# Patient Record
Sex: MALE | Race: WHITE | Employment: FULL TIME | URBAN - METROPOLITAN AREA
[De-identification: names, ages, dates, MRNs, and addresses within clinical notes are randomized per-mention and may not be internally consistent; named-entity substitution may affect disease eponyms.]

---

## 2018-01-28 ENCOUNTER — HOSPITAL ENCOUNTER (OUTPATIENT)
Facility: CLINIC | Age: 43
Setting detail: OBSERVATION
Discharge: HOME | End: 2018-01-29
Attending: EMERGENCY MEDICINE | Admitting: INTERNAL MEDICINE
Payer: COMMERCIAL

## 2018-01-28 DIAGNOSIS — E87.20 LACTIC ACIDOSIS: ICD-10-CM

## 2018-01-28 DIAGNOSIS — K52.9 ACUTE GASTROENTERITIS: ICD-10-CM

## 2018-01-28 LAB
ALBUMIN SERPL-MCNC: 4.1 G/DL (ref 3.4–5)
ALP SERPL-CCNC: 79 U/L (ref 40–150)
ALT SERPL W P-5'-P-CCNC: 26 U/L (ref 0–70)
ANION GAP SERPL CALCULATED.3IONS-SCNC: 12 MMOL/L (ref 3–14)
AST SERPL W P-5'-P-CCNC: 21 U/L (ref 0–45)
BASOPHILS # BLD AUTO: 0.1 10E9/L (ref 0–0.2)
BASOPHILS NFR BLD AUTO: 0.4 %
BILIRUB DIRECT SERPL-MCNC: 0.1 MG/DL (ref 0–0.2)
BILIRUB SERPL-MCNC: 0.5 MG/DL (ref 0.2–1.3)
BUN SERPL-MCNC: 19 MG/DL (ref 7–30)
CALCIUM SERPL-MCNC: 8.4 MG/DL (ref 8.5–10.1)
CHLORIDE SERPL-SCNC: 105 MMOL/L (ref 94–109)
CO2 SERPL-SCNC: 23 MMOL/L (ref 20–32)
CREAT SERPL-MCNC: 0.86 MG/DL (ref 0.66–1.25)
DIFFERENTIAL METHOD BLD: ABNORMAL
EOSINOPHIL # BLD AUTO: 0.2 10E9/L (ref 0–0.7)
EOSINOPHIL NFR BLD AUTO: 0.9 %
ERYTHROCYTE [DISTWIDTH] IN BLOOD BY AUTOMATED COUNT: 12.6 % (ref 10–15)
GFR SERPL CREATININE-BSD FRML MDRD: >90 ML/MIN/1.7M2
GLUCOSE BLDC GLUCOMTR-MCNC: 84 MG/DL (ref 70–99)
GLUCOSE SERPL-MCNC: 88 MG/DL (ref 70–99)
HCT VFR BLD AUTO: 52.3 % (ref 40–53)
HGB BLD-MCNC: 17.9 G/DL (ref 13.3–17.7)
IMM GRANULOCYTES # BLD: 0.1 10E9/L (ref 0–0.4)
IMM GRANULOCYTES NFR BLD: 0.4 %
LACTATE BLD-SCNC: 4.4 MMOL/L (ref 0.7–2)
LIPASE SERPL-CCNC: 104 U/L (ref 73–393)
LYMPHOCYTES # BLD AUTO: 1 10E9/L (ref 0.8–5.3)
LYMPHOCYTES NFR BLD AUTO: 6.1 %
MCH RBC QN AUTO: 28.9 PG (ref 26.5–33)
MCHC RBC AUTO-ENTMCNC: 34.2 G/DL (ref 31.5–36.5)
MCV RBC AUTO: 84 FL (ref 78–100)
MONOCYTES # BLD AUTO: 1.1 10E9/L (ref 0–1.3)
MONOCYTES NFR BLD AUTO: 6.6 %
NEUTROPHILS # BLD AUTO: 14.2 10E9/L (ref 1.6–8.3)
NEUTROPHILS NFR BLD AUTO: 85.6 %
NRBC # BLD AUTO: 0 10*3/UL
NRBC BLD AUTO-RTO: 0 /100
PLATELET # BLD AUTO: 296 10E9/L (ref 150–450)
POTASSIUM SERPL-SCNC: 3.2 MMOL/L (ref 3.4–5.3)
PROT SERPL-MCNC: 7.4 G/DL (ref 6.8–8.8)
RBC # BLD AUTO: 6.2 10E12/L (ref 4.4–5.9)
SODIUM SERPL-SCNC: 140 MMOL/L (ref 133–144)
WBC # BLD AUTO: 16.6 10E9/L (ref 4–11)

## 2018-01-28 PROCEDURE — 25000132 ZZH RX MED GY IP 250 OP 250 PS 637: Performed by: EMERGENCY MEDICINE

## 2018-01-28 PROCEDURE — 83605 ASSAY OF LACTIC ACID: CPT | Performed by: EMERGENCY MEDICINE

## 2018-01-28 PROCEDURE — 99285 EMERGENCY DEPT VISIT HI MDM: CPT | Mod: 25

## 2018-01-28 PROCEDURE — 96361 HYDRATE IV INFUSION ADD-ON: CPT

## 2018-01-28 PROCEDURE — 00000146 ZZHCL STATISTIC GLUCOSE BY METER IP

## 2018-01-28 PROCEDURE — 85025 COMPLETE CBC W/AUTO DIFF WBC: CPT | Performed by: EMERGENCY MEDICINE

## 2018-01-28 PROCEDURE — 80076 HEPATIC FUNCTION PANEL: CPT | Performed by: EMERGENCY MEDICINE

## 2018-01-28 PROCEDURE — 96374 THER/PROPH/DIAG INJ IV PUSH: CPT

## 2018-01-28 PROCEDURE — 25000128 H RX IP 250 OP 636: Performed by: EMERGENCY MEDICINE

## 2018-01-28 PROCEDURE — 25000125 ZZHC RX 250: Performed by: EMERGENCY MEDICINE

## 2018-01-28 PROCEDURE — 96375 TX/PRO/DX INJ NEW DRUG ADDON: CPT

## 2018-01-28 PROCEDURE — 80048 BASIC METABOLIC PNL TOTAL CA: CPT | Performed by: EMERGENCY MEDICINE

## 2018-01-28 PROCEDURE — 83690 ASSAY OF LIPASE: CPT | Performed by: EMERGENCY MEDICINE

## 2018-01-28 RX ORDER — SODIUM CHLORIDE, SODIUM LACTATE, POTASSIUM CHLORIDE, CALCIUM CHLORIDE 600; 310; 30; 20 MG/100ML; MG/100ML; MG/100ML; MG/100ML
INJECTION, SOLUTION INTRAVENOUS CONTINUOUS
Status: DISCONTINUED | OUTPATIENT
Start: 2018-01-28 | End: 2018-01-29

## 2018-01-28 RX ORDER — POTASSIUM CHLORIDE 1.5 G/1.58G
40 POWDER, FOR SOLUTION ORAL ONCE
Status: COMPLETED | OUTPATIENT
Start: 2018-01-28 | End: 2018-01-28

## 2018-01-28 RX ORDER — ONDANSETRON 2 MG/ML
4 INJECTION INTRAMUSCULAR; INTRAVENOUS ONCE
Status: COMPLETED | OUTPATIENT
Start: 2018-01-28 | End: 2018-01-28

## 2018-01-28 RX ADMIN — FAMOTIDINE 20 MG: 10 INJECTION, SOLUTION INTRAVENOUS at 23:08

## 2018-01-28 RX ADMIN — POTASSIUM CHLORIDE 40 MEQ: 1.5 POWDER, FOR SOLUTION ORAL at 23:04

## 2018-01-28 RX ADMIN — SODIUM CHLORIDE, POTASSIUM CHLORIDE, SODIUM LACTATE AND CALCIUM CHLORIDE 3975 ML: 600; 310; 30; 20 INJECTION, SOLUTION INTRAVENOUS at 22:30

## 2018-01-28 RX ADMIN — ONDANSETRON 4 MG: 2 INJECTION INTRAMUSCULAR; INTRAVENOUS at 22:13

## 2018-01-28 RX ADMIN — SODIUM CHLORIDE 1000 ML: 9 INJECTION, SOLUTION INTRAVENOUS at 22:13

## 2018-01-28 ASSESSMENT — ENCOUNTER SYMPTOMS
COUGH: 0
MYALGIAS: 0
ABDOMINAL PAIN: 1
NAUSEA: 1
VOMITING: 1
RHINORRHEA: 0
APPETITE CHANGE: 0
DIARRHEA: 0
FEVER: 0

## 2018-01-28 NOTE — IP AVS SNAPSHOT
MRN:[id number]                      After Visit Summary   1/28/2018    Karthik Lopez    MRN: [id number]           Thank you!     Thank you for choosing [facility] for your care. Our goal is always to provide you with excellent care. Hearing back from our patients is one way we can continue to improve our services. Please take a few minutes to complete the written survey that you may receive in the mail after you visit. If you would like to speak to someone directly about your visit please contact Patient Relations at [phone]. Thank you!          Patient Information     Date Of Birth          1975        About your hospital stay     You were admitted on:  January 29, 2018 You last received care in the:  [facility] Observation Department    You were discharged on:  January 29, 2018        Reason for your hospital stay       You were admitted for concerns of nausea and vomiting. Lab work showed signs of significant dehydration from vomiting, including an elevated lactic acid, white blood count, and low potassium. All of these resolved after treatment on follow up labs. Your stool was sent for culture and was pending at time of discharge. We suspect this was a viral infection vs food poisoning that does not require antibiotics. You were treated supportively on observation with fluids and nausea meds. You improved and were allowed to discharge home. We recommend you stay well hydrated and advance your diet as tolerated.                  Who to Call     For medical emergencies, please call 911.  For non-urgent questions about your medical care, please call your primary care provider or clinic, None          Attending Provider     Provider Specialty    Joselito So MD Emergency Medicine    Savanna Osborne MD Internal Medicine       Primary Care Provider Fax #    Provider Not In System [phone]      After Care Instructions     Activity       Your activity  "upon discharge: activity as tolerated            Diet       Follow this diet upon discharge: Orders Placed This Encounter      Advance Diet as Tolerated            Discharge Instructions       1. Until tolerating adequate oral intake, only take 30 units of your morning Lantus with sliding scale based on what you eat throughout the day.   2. Would hold your Invokana until your are tolerating a regular diet   3. Resume all your diabetic medications once you are eating a regular diet                  Follow-up Appointments     Follow-up and recommended labs and tests        Follow up with primary care provider within 7 days for hospital follow- up.  No follow up labs or test are needed.                             Pending Results     No orders found for last 3 day(s).            Statement of Approval     Ordered          01/29/18 1909  I have reviewed and agree with all the recommendations and orders detailed in this document.  EFFECTIVE NOW     Approved and electronically signed by:  Tracie Tam PA-C             Admission Information     Date & Time Provider Department Dept. Phone    1/28/2018 Savanna Osborne MD [facility] Observation Department [phone]      Your Vitals Were     Blood Pressure Pulse Temperature Respirations Height Weight    106/68 (BP Location: Right arm) 93 98.1  F (36.7  C) (Oral) 22 1.803 m (5' 11\") 134.1 kg (295 lb 9.6 oz)    Pulse Oximetry BMI (Body Mass Index)                94% 41.23 kg/m2          MyChart Information     [facility] lets you send messages to your doctor, view your test results, renew your prescriptions, schedule appointments and more. To sign up, go to [email or web address] . Click on \"Log in\" on the left side of the screen, which will take you to the Welcome page. Then click on \"Sign up Now\" on the right side of the page.     You will be asked to enter the access code listed below, as well as some personal information. Please follow the directions to " create your username and password.     Your access code is: [email or web address]  Expires: 2018  7:10 PM     Your access code will  in 90 days. If you need help or a new code, please call your [facility] clinic or [phone].        Care EveryWhere ID     This is your Care EveryWhere ID. This could be used by other organizations to access your [facility] medical records  [id number]        Equal Access to Services     [facility] : Hadii jeannine zavala hadasho Soomaali, waaxda luqadaha, qaybta kaalmada adeegyada, jing torresmilocrissy cloud . So [facility] [phone].    ATENCIÓN: Si habla español, tiene a romo disposición servicios gratuitos de asistencia lingüística. Juliana al [phone].    We comply with applicable federal civil rights laws and [place] laws. We do not discriminate on the basis of race, color, national origin, age, disability, sex, sexual orientation, or gender identity.               Review of your medicines      CONTINUE these medicines which have NOT CHANGED        Dose / Directions    aspirin 81 MG tablet        Dose:  81 mg   Take 81 mg by mouth daily   Refills:  0       canaliflozin tablet   Commonly known as:  INVOKANA        Dose:  300 mg   Take 300 mg by mouth every morning (before breakfast)   Refills:  0       HumaLOG KWIKpen 100 UNIT/ML injection   Generic drug:  insulin lispro   Notes to Patient:  As directed        Inject Subcutaneous 4 times daily (with meals and nightly) Injects 1 unit per 15 grams of carbohydrates, up to 70 units daily in 4 divided doses (with meals and at bedtime).   Refills:  0       insulin glargine 100 UNIT/ML injection   Commonly known as:  LANTUS        Dose:  60 Units   Inject 60 Units Subcutaneous every morning   Refills:  0       levothyroxine 137 MCG tablet   Commonly known as:  SYNTHROID/LEVOTHROID        Dose:  137 mcg   Take 137 mcg by mouth every morning (before breakfast)   Refills:  0                Protect others around you: Learn how to  safely use, store and throw away your medicines at www.disposemymeds.org.             Medication List: This is a list of all your medications and when to take them. Check marks below indicate your daily home schedule. Keep this list as a reference.      Medications           Morning Afternoon Evening Bedtime As Needed    aspirin 81 MG tablet   Take 81 mg by mouth daily                                   canaliflozin tablet   Commonly known as:  INVOKANA   Take 300 mg by mouth every morning (before breakfast)                                   HumaLOG KWIKpen 100 UNIT/ML injection   Inject Subcutaneous 4 times daily (with meals and nightly) Injects 1 unit per 15 grams of carbohydrates, up to 70 units daily in 4 divided doses (with meals and at bedtime).   Generic drug:  insulin lispro   Notes to Patient:  As directed                                            insulin glargine 100 UNIT/ML injection   Commonly known as:  LANTUS   Inject 60 Units Subcutaneous every morning                                   levothyroxine 137 MCG tablet   Commonly known as:  SYNTHROID/LEVOTHROID   Take 137 mcg by mouth every morning (before breakfast)

## 2018-01-28 NOTE — IP AVS SNAPSHOT
[facility] Observation Department    [address]    [facility] [address]    Phone:  [phone]                                       After Visit Summary   1/28/2018    Karthik Lopez    MRN: [id number]           After Visit Summary Signature Page     I have received my discharge instructions, and my questions have been answered. I have discussed any challenges I see with this plan with the nurse or doctor.    ..........................................................................................................................................  Patient/Patient Representative Signature      ..........................................................................................................................................  Patient Representative Print Name and Relationship to Patient    ..................................................               ................................................  Date                                            Time    ..........................................................................................................................................  Reviewed by Signature/Title    ...................................................              ..............................................  Date                                                            Time

## 2018-01-29 VITALS
RESPIRATION RATE: 22 BRPM | HEIGHT: 71 IN | BODY MASS INDEX: 41.38 KG/M2 | HEART RATE: 93 BPM | OXYGEN SATURATION: 94 % | TEMPERATURE: 98.1 F | SYSTOLIC BLOOD PRESSURE: 106 MMHG | DIASTOLIC BLOOD PRESSURE: 68 MMHG | WEIGHT: 295.6 LBS

## 2018-01-29 PROBLEM — E86.0 DEHYDRATION: Status: ACTIVE | Noted: 2018-01-29

## 2018-01-29 LAB
ALBUMIN UR-MCNC: NEGATIVE MG/DL
ANION GAP SERPL CALCULATED.3IONS-SCNC: 6 MMOL/L (ref 3–14)
APPEARANCE UR: CLEAR
BILIRUB UR QL STRIP: NEGATIVE
BUN SERPL-MCNC: 19 MG/DL (ref 7–30)
C COLI+JEJUNI+LARI FUSA STL QL NAA+PROBE: NOT DETECTED
CALCIUM SERPL-MCNC: 7.3 MG/DL (ref 8.5–10.1)
CHLORIDE SERPL-SCNC: 108 MMOL/L (ref 94–109)
CO2 BLDCOV-SCNC: 24 MMOL/L (ref 21–28)
CO2 SERPL-SCNC: 26 MMOL/L (ref 20–32)
COLOR UR AUTO: YELLOW
CREAT SERPL-MCNC: 0.82 MG/DL (ref 0.66–1.25)
EC STX1 GENE STL QL NAA+PROBE: NOT DETECTED
EC STX2 GENE STL QL NAA+PROBE: NOT DETECTED
ENTERIC PATHOGEN COMMENT: NORMAL
ERYTHROCYTE [DISTWIDTH] IN BLOOD BY AUTOMATED COUNT: 12.7 % (ref 10–15)
GFR SERPL CREATININE-BSD FRML MDRD: >90 ML/MIN/1.7M2
GLUCOSE BLDC GLUCOMTR-MCNC: 106 MG/DL (ref 70–99)
GLUCOSE BLDC GLUCOMTR-MCNC: 121 MG/DL (ref 70–99)
GLUCOSE BLDC GLUCOMTR-MCNC: 172 MG/DL (ref 70–99)
GLUCOSE BLDC GLUCOMTR-MCNC: 175 MG/DL (ref 70–99)
GLUCOSE BLDC GLUCOMTR-MCNC: 79 MG/DL (ref 70–99)
GLUCOSE SERPL-MCNC: 92 MG/DL (ref 70–99)
GLUCOSE UR STRIP-MCNC: >499 MG/DL
HBA1C MFR BLD: 8.8 % (ref 4.3–6)
HCT VFR BLD AUTO: 43.4 % (ref 40–53)
HGB BLD-MCNC: 14.7 G/DL (ref 13.3–17.7)
HGB UR QL STRIP: NEGATIVE
INTERPRETATION ECG - MUSE: NORMAL
KETONES UR STRIP-MCNC: 5 MG/DL
LACTATE BLD-SCNC: 1.6 MMOL/L (ref 0.7–2)
LACTATE BLD-SCNC: 3.7 MMOL/L (ref 0.7–2.1)
LEUKOCYTE ESTERASE UR QL STRIP: NEGATIVE
MCH RBC QN AUTO: 29.1 PG (ref 26.5–33)
MCHC RBC AUTO-ENTMCNC: 33.9 G/DL (ref 31.5–36.5)
MCV RBC AUTO: 86 FL (ref 78–100)
MUCOUS THREADS #/AREA URNS LPF: PRESENT /LPF
NITRATE UR QL: NEGATIVE
NOROV GI+II ORF1-ORF2 JNC STL QL NAA+PR: NOT DETECTED
PCO2 BLDV: 45 MM HG (ref 40–50)
PH BLDV: 7.33 PH (ref 7.32–7.43)
PH UR STRIP: 6 PH (ref 5–7)
PLATELET # BLD AUTO: 201 10E9/L (ref 150–450)
PO2 BLDV: 24 MM HG (ref 25–47)
POTASSIUM SERPL-SCNC: 4.2 MMOL/L (ref 3.4–5.3)
RBC # BLD AUTO: 5.05 10E12/L (ref 4.4–5.9)
RBC #/AREA URNS AUTO: 0 /HPF (ref 0–2)
RVA NSP5 STL QL NAA+PROBE: NOT DETECTED
SALMONELLA SP RPOD STL QL NAA+PROBE: NOT DETECTED
SAO2 % BLDV FROM PO2: 38 %
SHIGELLA SP+EIEC IPAH STL QL NAA+PROBE: NOT DETECTED
SODIUM SERPL-SCNC: 140 MMOL/L (ref 133–144)
SOURCE: ABNORMAL
SP GR UR STRIP: 1.03 (ref 1–1.03)
SQUAMOUS #/AREA URNS AUTO: <1 /HPF (ref 0–1)
UROBILINOGEN UR STRIP-MCNC: 2 MG/DL (ref 0–2)
V CHOL+PARA RFBL+TRKH+TNAA STL QL NAA+PR: NOT DETECTED
WBC # BLD AUTO: 10.4 10E9/L (ref 4–11)
WBC #/AREA URNS AUTO: <1 /HPF (ref 0–2)
Y ENTERO RECN STL QL NAA+PROBE: NOT DETECTED

## 2018-01-29 PROCEDURE — 00000146 ZZHCL STATISTIC GLUCOSE BY METER IP

## 2018-01-29 PROCEDURE — 83036 HEMOGLOBIN GLYCOSYLATED A1C: CPT | Performed by: INTERNAL MEDICINE

## 2018-01-29 PROCEDURE — 93005 ELECTROCARDIOGRAM TRACING: CPT

## 2018-01-29 PROCEDURE — 83605 ASSAY OF LACTIC ACID: CPT | Performed by: INTERNAL MEDICINE

## 2018-01-29 PROCEDURE — 96372 THER/PROPH/DIAG INJ SC/IM: CPT

## 2018-01-29 PROCEDURE — 85027 COMPLETE CBC AUTOMATED: CPT | Performed by: INTERNAL MEDICINE

## 2018-01-29 PROCEDURE — 25000131 ZZH RX MED GY IP 250 OP 636 PS 637: Performed by: PHYSICIAN ASSISTANT

## 2018-01-29 PROCEDURE — 25000128 H RX IP 250 OP 636: Performed by: PHYSICIAN ASSISTANT

## 2018-01-29 PROCEDURE — 83605 ASSAY OF LACTIC ACID: CPT

## 2018-01-29 PROCEDURE — 82803 BLOOD GASES ANY COMBINATION: CPT

## 2018-01-29 PROCEDURE — 25800025 ZZH RX 258: Performed by: INTERNAL MEDICINE

## 2018-01-29 PROCEDURE — G0378 HOSPITAL OBSERVATION PER HR: HCPCS

## 2018-01-29 PROCEDURE — 96361 HYDRATE IV INFUSION ADD-ON: CPT

## 2018-01-29 PROCEDURE — 81001 URINALYSIS AUTO W/SCOPE: CPT | Performed by: INTERNAL MEDICINE

## 2018-01-29 PROCEDURE — 87506 IADNA-DNA/RNA PROBE TQ 6-11: CPT | Performed by: INTERNAL MEDICINE

## 2018-01-29 PROCEDURE — 99207 ZZC CDG-CODE CATEGORY CHANGED: CPT | Performed by: INTERNAL MEDICINE

## 2018-01-29 PROCEDURE — 25000132 ZZH RX MED GY IP 250 OP 250 PS 637: Performed by: INTERNAL MEDICINE

## 2018-01-29 PROCEDURE — 80048 BASIC METABOLIC PNL TOTAL CA: CPT | Performed by: INTERNAL MEDICINE

## 2018-01-29 PROCEDURE — 36415 COLL VENOUS BLD VENIPUNCTURE: CPT | Performed by: INTERNAL MEDICINE

## 2018-01-29 PROCEDURE — 25000128 H RX IP 250 OP 636: Performed by: INTERNAL MEDICINE

## 2018-01-29 PROCEDURE — 99235 HOSP IP/OBS SAME DATE MOD 70: CPT | Performed by: INTERNAL MEDICINE

## 2018-01-29 PROCEDURE — 96376 TX/PRO/DX INJ SAME DRUG ADON: CPT

## 2018-01-29 RX ORDER — SODIUM CHLORIDE 9 MG/ML
INJECTION, SOLUTION INTRAVENOUS CONTINUOUS
Status: DISCONTINUED | OUTPATIENT
Start: 2018-01-29 | End: 2018-01-29

## 2018-01-29 RX ORDER — LEVOTHYROXINE SODIUM 137 UG/1
137 TABLET ORAL
COMMUNITY

## 2018-01-29 RX ORDER — AMOXICILLIN 250 MG
1 CAPSULE ORAL 2 TIMES DAILY PRN
Status: DISCONTINUED | OUTPATIENT
Start: 2018-01-29 | End: 2018-01-29 | Stop reason: HOSPADM

## 2018-01-29 RX ORDER — ASPIRIN 325 MG
325 TABLET ORAL ONCE
Status: DISCONTINUED | OUTPATIENT
Start: 2018-01-29 | End: 2018-01-29

## 2018-01-29 RX ORDER — NALOXONE HYDROCHLORIDE 0.4 MG/ML
.1-.4 INJECTION, SOLUTION INTRAMUSCULAR; INTRAVENOUS; SUBCUTANEOUS
Status: DISCONTINUED | OUTPATIENT
Start: 2018-01-29 | End: 2018-01-29 | Stop reason: HOSPADM

## 2018-01-29 RX ORDER — ONDANSETRON 4 MG/1
4 TABLET, ORALLY DISINTEGRATING ORAL EVERY 6 HOURS PRN
Status: DISCONTINUED | OUTPATIENT
Start: 2018-01-29 | End: 2018-01-29 | Stop reason: HOSPADM

## 2018-01-29 RX ORDER — ACETAMINOPHEN 650 MG/1
650 SUPPOSITORY RECTAL EVERY 4 HOURS PRN
Status: DISCONTINUED | OUTPATIENT
Start: 2018-01-29 | End: 2018-01-29 | Stop reason: HOSPADM

## 2018-01-29 RX ORDER — BISACODYL 10 MG
10 SUPPOSITORY, RECTAL RECTAL DAILY PRN
Status: DISCONTINUED | OUTPATIENT
Start: 2018-01-29 | End: 2018-01-29 | Stop reason: HOSPADM

## 2018-01-29 RX ORDER — ACETAMINOPHEN 325 MG/1
650 TABLET ORAL EVERY 4 HOURS PRN
Status: DISCONTINUED | OUTPATIENT
Start: 2018-01-29 | End: 2018-01-29 | Stop reason: HOSPADM

## 2018-01-29 RX ORDER — BISACODYL 5 MG
15 TABLET, DELAYED RELEASE (ENTERIC COATED) ORAL DAILY PRN
Status: DISCONTINUED | OUTPATIENT
Start: 2018-01-29 | End: 2018-01-29 | Stop reason: HOSPADM

## 2018-01-29 RX ORDER — ONDANSETRON 2 MG/ML
4 INJECTION INTRAMUSCULAR; INTRAVENOUS EVERY 6 HOURS PRN
Status: DISCONTINUED | OUTPATIENT
Start: 2018-01-29 | End: 2018-01-29 | Stop reason: HOSPADM

## 2018-01-29 RX ORDER — DEXTROSE MONOHYDRATE 25 G/50ML
25-50 INJECTION, SOLUTION INTRAVENOUS
Status: DISCONTINUED | OUTPATIENT
Start: 2018-01-29 | End: 2018-01-29 | Stop reason: HOSPADM

## 2018-01-29 RX ORDER — BISACODYL 5 MG
5 TABLET, DELAYED RELEASE (ENTERIC COATED) ORAL DAILY PRN
Status: DISCONTINUED | OUTPATIENT
Start: 2018-01-29 | End: 2018-01-29 | Stop reason: HOSPADM

## 2018-01-29 RX ORDER — LEVOTHYROXINE SODIUM 137 UG/1
137 TABLET ORAL
Status: DISCONTINUED | OUTPATIENT
Start: 2018-01-30 | End: 2018-01-29 | Stop reason: HOSPADM

## 2018-01-29 RX ORDER — DEXTROSE MONOHYDRATE, SODIUM CHLORIDE, AND POTASSIUM CHLORIDE 50; 1.49; 9 G/1000ML; G/1000ML; G/1000ML
INJECTION, SOLUTION INTRAVENOUS CONTINUOUS
Status: DISCONTINUED | OUTPATIENT
Start: 2018-01-29 | End: 2018-01-29

## 2018-01-29 RX ORDER — SODIUM CHLORIDE 9 MG/ML
INJECTION, SOLUTION INTRAVENOUS CONTINUOUS
Status: ACTIVE | OUTPATIENT
Start: 2018-01-29 | End: 2018-01-29

## 2018-01-29 RX ORDER — AMOXICILLIN 250 MG
2 CAPSULE ORAL 2 TIMES DAILY PRN
Status: DISCONTINUED | OUTPATIENT
Start: 2018-01-29 | End: 2018-01-29 | Stop reason: HOSPADM

## 2018-01-29 RX ORDER — BISACODYL 5 MG
10 TABLET, DELAYED RELEASE (ENTERIC COATED) ORAL DAILY PRN
Status: DISCONTINUED | OUTPATIENT
Start: 2018-01-29 | End: 2018-01-29 | Stop reason: HOSPADM

## 2018-01-29 RX ORDER — NICOTINE POLACRILEX 4 MG
15-30 LOZENGE BUCCAL
Status: DISCONTINUED | OUTPATIENT
Start: 2018-01-29 | End: 2018-01-29 | Stop reason: HOSPADM

## 2018-01-29 RX ADMIN — ACETAMINOPHEN 650 MG: 325 TABLET, FILM COATED ORAL at 17:02

## 2018-01-29 RX ADMIN — SODIUM CHLORIDE: 9 INJECTION, SOLUTION INTRAVENOUS at 13:25

## 2018-01-29 RX ADMIN — SODIUM CHLORIDE: 9 INJECTION, SOLUTION INTRAVENOUS at 02:18

## 2018-01-29 RX ADMIN — POTASSIUM CHLORIDE, DEXTROSE MONOHYDRATE AND SODIUM CHLORIDE: 150; 5; 900 INJECTION, SOLUTION INTRAVENOUS at 02:58

## 2018-01-29 RX ADMIN — INSULIN GLARGINE 30 UNITS: 100 INJECTION, SOLUTION SUBCUTANEOUS at 13:25

## 2018-01-29 RX ADMIN — ONDANSETRON 4 MG: 2 INJECTION INTRAMUSCULAR; INTRAVENOUS at 06:41

## 2018-01-29 NOTE — PLAN OF CARE
Problem: Patient Care Overview  Goal: Plan of Care/Patient Progress Review  PRIMARY DIAGNOSIS: GASTROENTERITIS      OUTPATIENT/OBSERVATION GOALS TO BE MET BEFORE DISCHARGE  1. Orthostatic performed: N/A      2. Tolerating PO fluid and/or antibiotics (if applicable): mild nausea- last throw up 6:20 am      3. Nausea/Vomiting/Diarrhea symptoms improved: No, threw up a few times overnight. On clears adat.       4. Pain status: 3/10 abd discomfort- declines intervention       5. Return to near baseline physical activity: Yes      Discharge Planner Nurse   Safe discharge environment identified: yes  Barriers to discharge: No       Entered by: Phyllis Haley 0900   Please review provider order for any additional goals.   Nurse to notify provider when observation goals have been met and patient is ready for discharge.     3/10 abdominal discomfort-declines intervention. Mild amount of nausea- received zofran at 0640- has declined since. Clear diet- adat. Pt tolerated jello and juice this am. If tolerating lunch items on clears will advance to fulls.  Patient has had 1 stool  Since admission- On enteric for pending enteric stool studies. Sugar 175 before lunch. Fluids switched to NS at 100.Ind in moving. Lantus reordered but at half the dose.

## 2018-01-29 NOTE — PLAN OF CARE
Problem: Patient Care Overview  Goal: Plan of Care/Patient Progress Review  Outcome: Improving  ROOM # 229    Living Situation (if not independent, order SW consult): lives independently with wife  Facility name:  : [facility]    Activity level at baseline: ind  Activity level on admit: ind      Patient registered to observation; given Patient Bill of Rights; given the opportunity to ask questions about observation status and their plan of care.  Patient has been oriented to the observation room, bathroom and call light is in place.    Discussed discharge goals and expectations with patient/family.

## 2018-01-29 NOTE — ED NOTES
Patient presents for complaint of vomiting since this evening. Patient states he was feeling well this morning, but this evening after eating dinner started vomiting. States he has vomited x4 since dinner. Hx type 1 diabetes. ABC intact, A&Ox4.

## 2018-01-29 NOTE — PHARMACY-ADMISSION MEDICATION HISTORY
Admission medication history interview status for this patient is complete. See [facility] admission navigator for allergy information, prior to admission medications and immunization status.     Medication history interview source(s):Patient  Medication history resources (including written lists, pill bottles, clinic record): Pharmacy  Primary pharmacy:[facility] [employer]   Changes made to PTA medication list:  Added: Aspirin 81mg  Deleted: None  Changed: Levothyroxine, Insulin Glargine, Insulin Lispro    Actions taken by pharmacist (provider contacted, etc): Called patient's pharmacy     Additional medication history information: Patient's pharmacy    Medication reconciliation/reorder completed by provider prior to medication history? Yes    Do you take OTC medications (eg tylenol, ibuprofen, fish oil, eye/ear drops, etc)? Yes, as above    For patients on insulin therapy: Yes  Lantus/levemir/NPH/Mix 70/30 dose:  Glargine 60 units under the skin every morning  Sliding scale Novolog:  No  If Yes, do you have a baseline novolog pre-meal dose: NA  Patients eat three meals a day:   Three meals per day and possible snack at bedtime (depending on SMBG results)  How many episodes of hypoglycemia do you have per week: Less than 1 (patient reports 1 to 2 episodes per month)  How many missed doses do you have per week: None  How many times do you check your blood glucose per day: Twice daily: In the morning and at bedtime    Any Barriers to therapy - Be specific :  cost of medications, comfortable with giving injections (if applicable), comfortable and confident with current diabetes regimen: Patient denies barriers      Prior to Admission medications    Medication Sig Last Dose Taking? Auth Provider   aspirin 81 MG tablet Take 81 mg by mouth daily 1/27/2018 at AM Yes Unknown, Entered By History   insulin glargine (LANTUS) 100 UNIT/ML injection Inject 60 Units Subcutaneous every morning 1/28/2018 at AM Yes Unknown, Entered By  History   levothyroxine (SYNTHROID/LEVOTHROID) 137 MCG tablet Take 137 mcg by mouth every morning (before breakfast) 1/28/2018 Yes Unknown, Entered By History   canaliflozin (INVOKANA) tablet Take 300 mg by mouth every morning (before breakfast) 1/28/2018 at AM Yes Reported, Patient   insulin lispro (HUMALOG KWIKPEN) 100 UNIT/ML injection Inject Subcutaneous 4 times daily (with meals and nightly) Injects 1 unit per 15 grams of carbohydrates, up to 70 units daily in 4 divided doses (with meals and at bedtime). Unknown at Unknown time  Unknown, Entered By History

## 2018-01-29 NOTE — CONSULTS
Care Transition Initial Assessment - RN    Reason For Consult: care coordination/care conference, discharge planning   Met with: Patient.    DATA   Active Problems:    Dehydration       Cognitive Status: awake, alert and oriented.        Contact information and PCP information verified: Pt does not have PCP, only Endocrinologist Dr. Reuben Kenney at [facility]    Lives With: spouse  Living Arrangements: house         Who is your support system?: Wife         Insurance concerns: No Insurance issues identified    ASSESSMENT  Patient currently receives the following services:  None        Identified issues/concerns regarding health management: Met w/ pt at bedside and discussed his DM management and A1c of 8.8. Pt does not have a PCP, he only follows w/ his Endocrinologist 2-3 times per year, he believes his last A1c was similar to his current level but is not sure. Pt reports that he has a good understanding of his medications and is comfortable w/ managing them. He checks his BG 1-2x/day and states his normal range is 100-140's. He tries to monitor his carb intake but states he could do better, he is falling asleep during our conversation.     Pt is interested in getting established w/ a new PCP at the [facility], printed clinic/provider information for him to review and call to schedule. Also gave pt calorie and carb shantel book to help w/ diet management.     PLAN  Patient given options and choices for discharge Yes .  Patient/family is agreeable to the plan?  Yes  Patient anticipates discharging to home .        Patient anticipates needs for home equipment: No  Discharge Planner   Discharge Plans in progress: yes  Barriers to discharge plan: None  Plan/Disposition: Home   Appointments: Pt declines assistance w/ scheduling.       Care  (CTS) will continue to follow as needed.      Indu Lanier RN BSN CTS   [phone]  Care Transitions Team  [facility]

## 2018-01-29 NOTE — PLAN OF CARE
Problem: Patient Care Overview  Goal: Plan of Care/Patient Progress Review  PRIMARY DIAGNOSIS: N/V    OUTPATIENT/OBSERVATION GOALS TO BE MET BEFORE DISCHARGE  1. Orthostatic performed: N/A    2. Tolerating PO fluid and/or antibiotics (if applicable): Patient recently consumed full liquid diet, denies nausea at this time.    3. Nausea/Vomiting/Diarrhea symptoms improved: Yes    4. Pain status: Pain free.    5. Return to near baseline physical activity: Yes    Temperature 100.2, tylenol given.      Discharge Planner Nurse   Safe discharge environment identified: Yes  Barriers to discharge: Possible discharge this evening, monitoring for n/v after meal       Entered by: Lorin Murillo 01/29/2018 5:12 PM     Please review provider order for any additional goals.   Nurse to notify provider when observation goals have been met and patient is ready for discharge.

## 2018-01-29 NOTE — PROGRESS NOTES
Infection Prevention:    Patient placed on Enteric precautions because of possible acute gastroenteritis. Please contact Infection Prevention with any questions/concerns at *[phone].    Maria Esther Smith, ICP

## 2018-01-29 NOTE — PLAN OF CARE
Problem: Patient Care Overview  Goal: Plan of Care/Patient Progress Review  Outcome: Improving  PRIMARY DIAGNOSIS: GASTROENTERITIS      OUTPATIENT/OBSERVATION GOALS TO BE MET BEFORE DISCHARGE  1. Orthostatic performed: N/A      2. Tolerating PO fluid and/or antibiotics (if applicable): moderate nausea- last throw up 6:20 am      3. Nausea/Vomiting/Diarrhea symptoms improved: No, threw up a few times overnight. On clears adat.       4. Pain status: 3/10 abd discomfort- declines intervention       5. Return to near baseline physical activity: Yes      Discharge Planner Nurse   Safe discharge environment identified: yes  Barriers to discharge: No       Entered by: Phyllis Haley 0900   Please review provider order for any additional goals.   Nurse to notify provider when observation goals have been met and patient is ready for discharge.    3/10 abdominal discomfort-declines intervention. Moderate amount of nausea- received zofran at 0640. Threw up shortly before that. Clear diet- adat. Pt ordered jello and juice- and having bites. Showered this morning. D5.45+20 running at 100. Patient had first stool sample since admission- formed and brown- sample sent. On enteric for pending enteric stool studies. Sugar this morning was 120s before breakfast food ordered. Ind in moving.

## 2018-01-29 NOTE — PLAN OF CARE
Problem: Patient Care Overview  Goal: Plan of Care/Patient Progress Review  Outcome: Improving  PRIMARY DIAGNOSIS: GASTROENTERITIS     OUTPATIENT/OBSERVATION GOALS TO BE MET BEFORE DISCHARGE  1. Orthostatic performed: N/A     2. Tolerating PO fluid and/or antibiotics (if applicable): having some nausea, vomited and was given IV anti emetic.  IV fluids infusing       3. Nausea/Vomiting/Diarrhea symptoms improved: No,  has had some diarrhea at home     4. Pain status: having some cramping     5. Return to near baseline physical activity: Yes     Discharge Planner Nurse   Safe discharge environment identified: yes  Barriers to discharge: No       Entered by: Dominique Galvan 01/29/2018 3:06 AM  Please review provider order for any additional goals.   Nurse to notify provider when observation goals have been met and patient is ready for discharge.

## 2018-01-29 NOTE — ED PROVIDER NOTES
History     Chief Complaint:  Vomiting    HPI   Karthik Lopez is a 42 year old male with a history of type 1 diabetes who presents to the emergency department today for evaluation of vomiting. The patient ate [employer] around 1800 and an hour later began feeling gassy and vomiting with associated sharp stabbing mid abdominal pain and one episode of loose stool. He took his normal dose of insulin at 1830, prior to vomiting. He vomited 3-4 times. Due to persistent symptoms and concern for food poisoning, he presents to the ED for further evaluation. He otherwise felt normal throughout the day today. His abdominal pain has subsided prior to arrival. The patient denies diarrhea, fevers, cough, dysuria, rhinorrhea, and myalgias.     Allergies:  No Known Drug Allergies     Medications:    canaliflozin (INVOKANA) tablet  insulin glargine (LANTUS) 100 UNIT/ML injection  LEVOTHYROXINE SODIUM PO  insulin lispro (HUMALOG) 100 UNIT/ML injection    Past Medical History:    Diabetes type 1  Hypothyroid    Past Surgical History:    Orchiectomy inguinal child  Tonsillectomy     Family History:    History reviewed. No pertinent family history.     Social History:  The patient was accompanied to the ED by his wife.  Smoking Status: Never  Smokeless Tobacco: Never  Alcohol Use: Yes  Marital Status:        Review of Systems   Constitutional: Negative for appetite change and fever.   HENT: Negative for rhinorrhea.    Respiratory: Negative for cough.    Gastrointestinal: Positive for abdominal pain, nausea and vomiting. Negative for diarrhea.   Musculoskeletal: Negative for myalgias.   All other systems reviewed and are negative.    Physical Exam   First Vitals:  BP: (!) 166/96  Pulse: 121  Temp: 97.1  F (36.2  C)  Resp: 28  Weight: 132.5 kg (292 lb)  SpO2: 96 %    Physical Exam  Constitutional:  Oriented to person, place, and time. Well appearing. Non-toxic.   HENT:   Head:    Normocephalic.   Mouth/Throat:   Oropharynx  is clear and moist.   Eyes:    EOM are normal. Pupils are equal, round, and reactive to light.   Neck:    Neck supple.   Cardiovascular:  Normal rate, regular rhythm and normal heart sounds.      Exam reveals no gallop and no friction rub.       No murmur heard.  Pulmonary/Chest:  Effort normal and breath sounds normal.      No respiratory distress. No wheezes. No rales.      No reproducible chest wall pain.  Abdominal:   Soft. No distension. No rebound and no guarding.      Mild abdominal tenderness throughout.      No point tenderness to right upper quadrant.   Musculoskeletal:  Normal range of motion.   Neurological:   Alert and oriented to person, place, and time.           Moves all 4 extremities spontaneously    Skin:    No rash noted. No pallor.     Emergency Department Course     ECG:  Indication: Vomiting  Completed at 0055.  Read at 0103.   Sinus tachycardia  Otherwise normal ECG   Rate 107 bpm. CO interval 144. QRS duration 80. QT/QTc 324/432. P-R-T axes 43 45 30.    Laboratory:  Laboratory findings were communicated with the patient and family who voiced understanding of the findings.  Hepatic panel: AWNL   Lipase: 104  CBC: WBC 16.6 (H) , HGB 17.9 (H)  o/w WNL. ()   BMP: Potassium 3.2 (L), Calcium 8.4 (L) o/w WNL (Creatinine 0.86)  Lactic Acid (Collected 2215): 4.4 (HH)  Glucose by meter: 84   ISTAT gases lactate june POCT: pH Venous 7.33, PCO2 Venous 45, PO2 Venous 24 (L), Bicarbonate Venous 24, O2 Sat Venous 38, Lactic Acid 3.7 (H)     Interventions:  2213 NS Bolus 1,000mL IV  2213 Zofran 4mg IV  2230 Lactated Ringers BOlus 3,975mLs IV  2304 Potassium Chloride 40mEq PO  2308 Pepcid 20mg IV     Emergency Department Course:  Nursing notes and vitals reviewed.  IV was inserted and blood was drawn for laboratory testing, results above.  2225: I performed an exam of the patient as documented above.   0040: Patient rechecked and updated. Patient's pain is resolved and repeat abdomen exam is benign.    0047: I spoke with Dr. Osborne of the hospitalist service regarding patient's presentation, findings, and plan of care.  Findings and plan explained to the Patient and spouse who consents to admission. Discussed the patient with Dr. Osborne, who will admit the patient to an obs bed for further monitoring, evaluation, and treatment.  I personally reviewed the laboratory results with the Patient and spouse and answered all related questions prior to admission.    Impression & Plan      CMS Diagnoses: Lactate is greater than 2 due to dehydration, at this time there is no sign of severe sepsis or septic shock.    Medical Decision Making:  Karthik Lopez is a 42 year old male that came in complaining of vomiting and loose bowel movement. Patient attributes it to possible fast food. He was initially complaining of abdominal pain which is currently resolved. He otherwise has a benign abdomen one exam. I would doubt surgical process in particular acute appendicitis. He did end up having work up as seen. Lactic was quite elevated at 4.4, likely due to dehydration. No concern for septic shock. After 30cc/kg fluid bolus, repeat lactic acid is going in the right direction at 3.7, but is still elevated. I believe he will need further fluid hydration. Therefore, patient will be admitted for further monitoring.     Diagnosis:    ICD-10-CM    1. Acute gastroenteritis K52.9    2. Lactic acidosis E87.2      Disposition:  Admitted to obs    Scribe Disclosure:  I, Rosemary Melendez, am serving as a scribe at 10:24 PM on 1/28/2018 to document services personally performed by Joselito So MD based on my observations and the provider's statements to me.     1/28/2018   [facility] EMERGENCY DEPARTMENT       Joselito So MD  01/29/18 0108

## 2018-01-29 NOTE — PLAN OF CARE
Problem: Patient Care Overview  Goal: Plan of Care/Patient Progress Review  Outcome: Improving  PRIMARY DIAGNOSIS: GASTROENTERITIS    OUTPATIENT/OBSERVATION GOALS TO BE MET BEFORE DISCHARGE  1. Orthostatic performed: N/A    2. Tolerating PO fluid and/or antibiotics (if applicable): taking sips, IV fluids infusing    3. Nausea/Vomiting/Diarrhea symptoms improved: No,  has had some diarrhea at home    4. Pain status: having some cramping    5. Return to near baseline physical activity: Yes    Discharge Planner Nurse   Safe discharge environment identified: yes  Barriers to discharge: No       Entered by: Dominique Galvan 01/29/2018 3:06 AM     Please review provider order for any additional goals.   Nurse to notify provider when observation goals have been met and patient is ready for discharge.

## 2018-01-29 NOTE — DISCHARGE SUMMARY
Critical access hospital Outpatient / Observation Unit  Discharge Summary        Karthik Lopez MRN# [id number]   YOB: 1975 Age: 42 year old     Date of Admission:  1/28/2018  Date of Discharge:  1/29/2018  Admitting Physician:  Savanna Osborne MD  Discharge Physician: Maria M Berger PA-C  Discharging Service: Hospitalist      Primary Provider: System, Provider Not In  Primary Care Physician Phone Number: None         Primary Discharge Diagnoses:    Mr. Lopez is a 42-year-old gentleman who has a past medical history of type 1 diabetes, hypothyroidism and obstructive sleep apnea, compliant with CPAP, who presented to this facility with nausea and vomiting.     1. Nausea, vomiting: acute onset of nausea with multiple episodes of vomiting after eating at [employer] yesterday evening, symptoms may be related to food poisoning or a viral syndrome with associated nasal congestion, myalgias, and increased fatigue. No episodes of diarrhea but stool sample was collected and enteric panel checked was negative. No episodes of emesis since the morning of discharge around 6:00 AM. Patient tolerated a regular diet prior to discharge, recommended continuing a bland diet and advancing as tolerated.      2. Lactic acidosis: initially lactic acid of 4.4 with improvement down to 1.6 on repeat after IVFs.      3. Hypokalemia: resolved with potassium of 3.2 with improvement to 4.2 after supplementation.      4. Type 1 diabetes: blood glucose levels stable, initially received D5 NS with KCl due to suspicion for component of starvation contributing to his lactic acidosis earlier. HgbA1C of 8.8 this admission. Home regimen includes Lantus 60 units in AM, Invokana 300 mg every AM, and Humalog QID with meals (1 unit per 15 grams of carbohydrates). Gave 30 units of Lantus the morning of discharge due to decreased PO intake. Discussed with patient about continuing decreased Lantus dose of 30 units until PO well tolerated as well hold oral  agents until then as well. Patient expresses understanding of adjusting diabetic medications while eating less.      5. CHAZ: uses CPAP at home, continue upon discharge      6. Hypothyroidism: continue Levothyroxine          Secondary Discharge Diagnoses:     Past Medical History:   Diagnosis Date     Diabetes (H)      Hypothyroid             Code Status:      Full Code        Brief Hospital Summary:        Reason for your hospital stay       You were admitted for concerns of nausea and vomiting. Lab work showed   signs of significant dehydration from vomiting, including an elevated   lactic acid, white blood count, and low potassium. All of these resolved   after treatment on follow up labs. Your stool was sent for culture and was   pending at time of discharge. We suspect this was a viral infection vs   food poisoning that does not require antibiotics. You were treated   supportively on observation with fluids and nausea meds. You improved and   were allowed to discharge home. We recommend you stay well hydrated and   advance your diet as tolerated.                  Please refer to initial admission history and physical for further details.   Briefly, Karthik Lopez was admitted on 1/28/2018 for concerns of acute nausea, vomiting and diarrhea. Work up in ED did not show any evidence of bowel obstruction. Pt was registered to the Observation Unit for continued supportive therapy for acute gastroenteritis..     Pt was resuscitated with IV fluids and continued on supportive measures including anti-emetics and pain control as needed. Labs were reviewed and significant results addressed. On the day of discharge, symptoms were resolving and pt was able to tolerate PO intake. Vitals were stable, without evidence of orthostasis. Medications were reviewed and adjustments made as necessary. Pt is instructed to follow up as below.            Significant Lab During Hospitalization:        Recent Labs  Lab 01/29/18  6907  01/28/18  2215   WBC 10.4 16.6*   HGB 14.7 17.9*   HCT 43.4 52.3   MCV 86 84    296       Recent Labs  Lab 01/29/18  0451 01/28/18  2215    140   POTASSIUM 4.2 3.2*   CHLORIDE 108 105   CO2 26 23   ANIONGAP 6 12   GLC 92 88   BUN 19 19   CR 0.82 0.86   GFRESTIMATED >90 >90   GFRESTBLACK >90 >90   BAUTISTA 7.3* 8.4*       Recent Labs  Lab 01/29/18  0451 01/29/18  0038 01/28/18  2215   LACT 1.6 3.7* 4.4*              Significant Imaging During Hospitalization:      Results for orders placed or performed in visit on 03/12/10   RT X-RAY KNEE 3 VIEW    Impression       KNEE 3 VIEW RIGHT Mar 12, 2010 3:25:00 AM     HISTORY:  knee pain,  Pain, Eval for Fracture.  [place],      FINDINGS: Negative.              Pending Results:      None        Consultations This Hospital Stay:      No consultations were requested during this admission         Discharge Instructions and Follow-Up:      Follow-up Appointments    Follow up with primary care provider within 7 days for hospital follow- up.  No follow up labs or test are needed.           Discharge Disposition:      Discharged to home         Discharge Medications:        Current Discharge Medication List      CONTINUE these medications which have NOT CHANGED    Details   aspirin 81 MG tablet Take 81 mg by mouth daily      insulin glargine (LANTUS) 100 UNIT/ML injection Inject 60 Units Subcutaneous every morning      levothyroxine (SYNTHROID/LEVOTHROID) 137 MCG tablet Take 137 mcg by mouth every morning (before breakfast)      canaliflozin (INVOKANA) tablet Take 300 mg by mouth every morning (before breakfast)      insulin lispro (HUMALOG KWIKPEN) 100 UNIT/ML injection Inject Subcutaneous 4 times daily (with meals and nightly) Injects 1 unit per 15 grams of carbohydrates, up to 70 units daily in 4 divided doses (with meals and at bedtime).                 Allergies:       No Known Allergies        Condition and Physical on Discharge:      Discharge condition: Stable  "  Vitals: Blood pressure 94/55, pulse 87, temperature 99.6  F (37.6  C), temperature source Oral, resp. rate 24, height 1.803 m (5' 11\"), weight 134.1 kg (295 lb 9.6 oz), SpO2 96 %.  295 lbs 9.6 oz      GEN:  Alert, oriented x 3, appears comfortable, NAD.  HEENT:  Normocephalic/atraumatic, no scleral icterus, no nasal discharge but appears congested, mouth moist.  CV:  Regular rate and rhythm, no murmur or JVD.  S1 + S2 noted, no S3 or S4.  LUNGS:  Clear to auscultation bilaterally without rales/rhonchi/wheezing/retractions.  Symmetric chest rise on inhalation noted.  ABD:  Active bowel sounds, soft, non-tender/non-distended.  No rebound/guarding/rigidity.  EXT:  No edema.  No cyanosis.  No acute joint synovitis noted.  SKIN:  Dry to touch, no exanthems noted in the visualized areas.    Maria M Berger PA-C  "

## 2018-01-29 NOTE — ED NOTES
Observation Brochure and Video   Patient and family informed of observation status based on provider's order. Observation Brochure was given and video watched.  Mindy Palacios RN

## 2018-01-29 NOTE — ED NOTES
[facility]  ED Nurse Handoff Report    Karthik Lopez is a 42 year old male   ED Chief complaint: Vomiting  . ED Diagnosis:   Final diagnoses:   Acute gastroenteritis   Lactic acidosis     Allergies: No Known Allergies    Code Status: Full Code  Activity level - Baseline/Home:  Independent. Activity Level - Current:   Stand with Assist. Lift room needed: No. Bariatric: No   Needed: No   Isolation: No. Infection: Not Applicable.     Vital Signs:   Vitals:    01/28/18 2345 01/29/18 0000 01/29/18 0029 01/29/18 0039   BP: 128/71 112/79     Pulse:  103  99   Resp:       Temp:    98.3  F (36.8  C)   TempSrc:    Oral   SpO2: 94% 93% 96% 94%   Weight:           Cardiac Rhythm:  ,      Pain level:    Patient confused: No. Patient Falls Risk: No.   Elimination Status: Has voided   Patient Report - Initial Complaint: vomiting. Focused Assessment: Patient presents for complaint of vomiting since this evening. Patient states he was feeling well this morning, but this evening after eating dinner started vomiting. States he has vomited x4 since dinner. Hx type 1 diabetes. ABC intact, A&Ox4.   Tests Performed: labd. Abnormal Results:   Labs Ordered and Resulted from Time of ED Arrival Up to the Time of Departure from the ED   CBC WITH PLATELETS DIFFERENTIAL - Abnormal; Notable for the following:        Result Value    WBC 16.6 (*)     RBC Count 6.20 (*)     Hemoglobin 17.9 (*)     Absolute Neutrophil 14.2 (*)     All other components within normal limits   BASIC METABOLIC PANEL - Abnormal; Notable for the following:     Potassium 3.2 (*)     Calcium 8.4 (*)     All other components within normal limits   LACTIC ACID WHOLE BLOOD - Abnormal; Notable for the following:     Lactic Acid 4.4 (*)     All other components within normal limits   ISTAT  GASES LACTATE DARRON POCT - Abnormal; Notable for the following:     PO2 Venous 24 (*)     Lactic Acid 3.7 (*)     All other components within normal limits    GLUCOSE BY METER   HEPATIC PANEL   LIPASE   ROUTINE UA WITH MICROSCOPIC   PULSE OXIMETRY NURSING   CARDIAC CONTINUOUS MONITORING   PATIENT CARE ORDER   ISTAT CG4 GASES LACTATE DARRON NURSING POCT   MEASURE URINE OUTPUT     No orders to display   .   Treatments provided: IVF, Zofran, K+, Pepcid  Family Comments: spouse at bedside  OBS brochure/video discussed/provided to patient:  No  ED Medications:   Medications   lactated ringers infusion (not administered)   0.9% sodium chloride BOLUS (0 mLs Intravenous Stopped 1/28/18 2231)   ondansetron (ZOFRAN) injection 4 mg (4 mg Intravenous Given 1/28/18 2213)   lactated ringers BOLUS 3,975 mL (0 mLs Intravenous Stopped 1/29/18 0038)   famotidine (PEPCID) injection 20 mg (20 mg Intravenous Given 1/28/18 2308)   potassium chloride (KLOR-CON) Packet 40 mEq (40 mEq Oral Given 1/28/18 2304)     Drips infusing:  No  For the majority of the shift, the patient's behavior Green. Interventions performed were NA.     Severe Sepsis OR Septic Shock Diagnosis Present: No      ED Nurse Name/Phone Number: Mindy Palacios,   12:45 AM  RECEIVING UNIT ED HANDOFF REVIEW    Above ED Nurse Handoff Report was reviewed: Yes  Reviewed by: Dominique Galvan on January 29, 2018 at 1:03 AM

## 2018-01-29 NOTE — H&P
Admitted:     01/28/2018      CHIEF COMPLAINT:  Refractory nausea and vomiting.      HISTORY OF PRESENT ILLNESS:  Mr. Lopez is a 42-year-old gentleman who has a past medical history of type 1 diabetes, hypothyroidism and obstructive sleep apnea, compliant with CPAP, who presented to this facility with nausea and vomiting.  He states it began about 7:00 p.m. tonight after he ate something from [employer].  He has had no fevers or chills that he knows of but is starting to feel warmer to the touch.  He has noted some evolving nasal congestion as well.  He reports that his children have been sick with a sore throat and listless last week, but they had no GI symptoms.  He has had no travel.  He has had no significant abdominal pain or diarrhea with this.      In the emergency room, he was noted to be tachycardic into the 120s, but he is afebrile.  Labs are notable for some hypokalemia at 3.2 and he had significant lactic acidosis at 4.4.  He was actually given a 4 liter bolus and his lactic acid only came down to 3.7.  There is no evidence of respiratory compensation in association with this lactic acidosis and he has no anion gap.  CBC is notable for white count of 16.6 as well as polycythemia and hemoglobin of 17.9.  Furthermore, his glucose is actually within normal limits.  He is admitted to observation for lactic acidosis in the setting of suspected dehydration without diabetic ketoacidosis with refractory nausea and vomiting.      PAST MEDICAL HISTORY:   1.  Type 1 diabetes, on insulin.   2.  Obstructive sleep apnea, compliant with CPAP.   3.  Hypothyroidism due to autoimmune thyroid disorder, on levothyroxine.      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      MEDICATIONS:  His medications have not been formally reconciled but appear to be:   1.  Invokana 300 mg p.o. q.a.m.   2.  Insulin glargine at bedtime.   3.  Insulin lispro q.i.d. with meals and at bedtime.    4.  Levothyroxine replacement.      ALLERGIES:  NO KNOWN DRUG ALLERGIES.      SOCIAL HISTORY:  He is .  He lives with his wife and children.  He does not smoke, occasionally drinks alcohol.      FAMILY HISTORY:  Reviewed and noncontributory to this hospitalization.      REVIEW OF SYSTEMS:  A 10-point review of systems was completed and is otherwise negative except as listed in the HPI.      PHYSICAL EXAMINATION:   VITAL SIGNS:  Blood pressures are in the 100s-one-teens/50s-90s, heart rate in the 90s-100s.  Respiratory rate is 18 and sats are 94% on room air and he is afebrile at 98.4 degrees.   GENERAL:  This is a pleasant male. He looks uncomfortable but he is in no acute distress.  He looks his stated age.   HEENT:  Normocephalic, atraumatic.  Sclerae show mild bilateral injection, but there is no icterus.  Pupils are equal, round and reactive to light.  Oropharynx shows mildly dry mucous membranes without posterior pharyngeal erythema or exudate.   NECK:  Supple.   LUNGS:  Clear to auscultation.  He exhibits normal respiratory effort.   HEART:  Regular rate and rhythm without murmurs, rubs or gallops.   ABDOMEN:  Soft and nontender to palpation.  Bowel sounds are hypoactive but present.   EXTREMITIES:  Lower extremities are without edema.   SKIN:  Warm and dry and there is no cyanosis or clubbing of the extremities.   NEUROLOGIC:  Cranial nerves II-XII are grossly intact.  He is moving all extremities and strength appears symmetric.      LABORATORY DATA:  Comprehensive metabolic panel is completed and is notable for potassium of 3.2.  BUN and creatinine are 19 and 0.86, respectively.  Calcium is 8.4.  LFTs are within normal limits.  Hemoglobin A1c is obtained and is elevated at 8.8%.  Lipase is 104, presenting lactic acid was 4.4 and after 4 liters of fluid only came down to 3.7, glucose is normal at 88 and 79.  VBG shows pH of 7.33, pCO2 of 45 and pO2 of 24.  CBC shows a white count of 16.6, hemoglobin  of 17.9 and platelet count of 296.  EKG was completed and my personal review shows normal sinus rhythm.  There are no abnormal findings.      ASSESSMENT AND PLAN:  Mr. Lopez is a 42-year-old gentleman who has a past medical history for type 1 diabetes, hypothyroidism, obstructive sleep apnea, compliant with continuous positive airway pressure, who presents to this facility with refractory nausea and vomiting after eating [employer], and labs notable for persistent lactic acidosis.   1.  Lactic acidosis.  I suspect this is due to dehydration.  His abdominal exam is very benign to me and he had no pain, no guarding and no rebound, so I doubt a catastrophic abdominal process.  I do suspect that he might also have a bit of a ketosis from all of his nausea and vomiting.  He has received 4 liters of lactated Ringer's in the emergency room. I am going to switch that over to D5 normal saline with potassium.  We will recheck a lactate at 5:00 a.m., which would be 2 hours from now.   2.  Hypokalemia in the setting of a somewhat refractory nausea and vomiting.  He received 40 mEq dose in the emergency room orally.  I have added 20 mEq to his IV fluids.   3.  Nausea and vomiting.  He has a sense that he might need to go to the bathroom, but he has not had any diarrhea.  We will put in place p.r.n. empiric panel if indeed he starts to have diarrhea.  My suspicion is that he has an evolving viral gastroenteritis, cannot rule out food poisoning at this point in time given its onset after eating [employer].   4.  Type 1 diabetes, currently blood sugars are within normal limits.  I have recommended monitoring with an insulin sliding scale -had to add D5 to his regimen due to suspicion for a component of starvation, lack of adequate glucose to his fluids that might be propagating his lactic acidosis.  He is high risk for high blood sugars, especially in the setting of a hemoglobin A1c that is inadequately managed at 8.8%.   5.   Obstructive sleep apnea.  He does not have his continuous positive airway pressure machine here.  If he looks to be staying here for a prolonged period of time, he will need to bring that from home.   6.  Hypothyroidism.  Resume levothyroxine.   7.  Prophylaxis.  Ambulate.   8.  Code status is full.   9.  Disposition:  Admit observation.         ELIAS MAY MD             D: 2018   T: 2018   MT: GUEVARA      Name:     JACKSON EASTMAN   MRN:      [id number]        Account:      [id number]   :      1975        Admitted:     2018                   Document: [id number]

## 2018-01-30 NOTE — PROGRESS NOTES
Patient VSS. Denies nausea.  Discharge paperwork printed and reviewed with patient, questions answered.  Patient discharged with family at 1950 to home.  Wheeled to entrance by staff.

## 2018-02-10 ENCOUNTER — TRANSFERRED RECORDS (OUTPATIENT)
Dept: HEALTH INFORMATION MANAGEMENT | Facility: CLINIC | Age: 43
End: 2018-02-10

## 2018-12-31 ENCOUNTER — APPOINTMENT (OUTPATIENT)
Dept: GENERAL RADIOLOGY | Facility: CLINIC | Age: 43
DRG: 637 | End: 2018-12-31
Attending: EMERGENCY MEDICINE
Payer: COMMERCIAL

## 2018-12-31 ENCOUNTER — HOSPITAL ENCOUNTER (INPATIENT)
Facility: CLINIC | Age: 43
LOS: 4 days | Discharge: HOME | DRG: 637 | End: 2019-01-04
Attending: EMERGENCY MEDICINE | Admitting: INTERNAL MEDICINE
Payer: COMMERCIAL

## 2018-12-31 DIAGNOSIS — E10.9 TYPE 1 DIABETES MELLITUS WITHOUT COMPLICATION (H): Primary | ICD-10-CM

## 2018-12-31 DIAGNOSIS — E87.3 RESPIRATORY ALKALOSIS: ICD-10-CM

## 2018-12-31 DIAGNOSIS — E10.10 DIABETIC KETOACIDOSIS WITHOUT COMA ASSOCIATED WITH TYPE 1 DIABETES MELLITUS (H): ICD-10-CM

## 2018-12-31 DIAGNOSIS — J18.9 PNEUMONIA DUE TO INFECTIOUS ORGANISM, UNSPECIFIED LATERALITY, UNSPECIFIED PART OF LUNG: ICD-10-CM

## 2018-12-31 PROBLEM — E11.10 DKA (DIABETIC KETOACIDOSIS) (H): Status: ACTIVE | Noted: 2018-12-31

## 2018-12-31 LAB
ALBUMIN SERPL-MCNC: 3.8 G/DL (ref 3.4–5)
ALBUMIN UR-MCNC: 30 MG/DL
ALP SERPL-CCNC: 124 U/L (ref 40–150)
ALT SERPL W P-5'-P-CCNC: 27 U/L (ref 0–70)
ANION GAP SERPL CALCULATED.3IONS-SCNC: 14 MMOL/L (ref 3–14)
ANION GAP SERPL CALCULATED.3IONS-SCNC: 24 MMOL/L (ref 3–14)
ANION GAP SERPL CALCULATED.3IONS-SCNC: 26 MMOL/L (ref 3–14)
ANION GAP SERPL CALCULATED.3IONS-SCNC: 29 MMOL/L (ref 3–14)
ANION GAP SERPL CALCULATED.3IONS-SCNC: 29 MMOL/L (ref 3–14)
APPEARANCE UR: CLEAR
APTT PPP: 25 SEC (ref 22–37)
AST SERPL W P-5'-P-CCNC: 15 U/L (ref 0–45)
BASE DEFICIT BLDV-SCNC: 19.9 MMOL/L
BASOPHILS # BLD AUTO: 0.1 10E9/L (ref 0–0.2)
BASOPHILS NFR BLD AUTO: 0.5 %
BILIRUB SERPL-MCNC: 0.4 MG/DL (ref 0.2–1.3)
BILIRUB UR QL STRIP: NEGATIVE
BUN SERPL-MCNC: 20 MG/DL (ref 7–30)
BUN SERPL-MCNC: 21 MG/DL (ref 7–30)
BUN SERPL-MCNC: 22 MG/DL (ref 7–30)
BUN SERPL-MCNC: 23 MG/DL (ref 7–30)
BUN SERPL-MCNC: 23 MG/DL (ref 7–30)
CALCIUM SERPL-MCNC: 7.5 MG/DL (ref 8.5–10.1)
CALCIUM SERPL-MCNC: 7.9 MG/DL (ref 8.5–10.1)
CALCIUM SERPL-MCNC: 8 MG/DL (ref 8.5–10.1)
CALCIUM SERPL-MCNC: 8.1 MG/DL (ref 8.5–10.1)
CALCIUM SERPL-MCNC: 8.4 MG/DL (ref 8.5–10.1)
CHLORIDE SERPL-SCNC: 101 MMOL/L (ref 94–109)
CHLORIDE SERPL-SCNC: 103 MMOL/L (ref 94–109)
CHLORIDE SERPL-SCNC: 104 MMOL/L (ref 94–109)
CHLORIDE SERPL-SCNC: 109 MMOL/L (ref 94–109)
CHLORIDE SERPL-SCNC: 114 MMOL/L (ref 94–109)
CO2 BLDCOV-SCNC: 4 MMOL/L (ref 21–28)
CO2 BLDCOV-SCNC: 4 MMOL/L (ref 21–28)
CO2 SERPL-SCNC: 14 MMOL/L (ref 20–32)
CO2 SERPL-SCNC: 3 MMOL/L (ref 20–32)
CO2 SERPL-SCNC: 4 MMOL/L (ref 20–32)
CO2 SERPL-SCNC: 5 MMOL/L (ref 20–32)
CO2 SERPL-SCNC: 7 MMOL/L (ref 20–32)
COLOR UR AUTO: ABNORMAL
CREAT SERPL-MCNC: 0.85 MG/DL (ref 0.66–1.25)
CREAT SERPL-MCNC: 1.16 MG/DL (ref 0.66–1.25)
CREAT SERPL-MCNC: 1.19 MG/DL (ref 0.66–1.25)
CREAT SERPL-MCNC: 1.2 MG/DL (ref 0.66–1.25)
CREAT SERPL-MCNC: 1.24 MG/DL (ref 0.66–1.25)
DIFFERENTIAL METHOD BLD: ABNORMAL
EOSINOPHIL # BLD AUTO: 0 10E9/L (ref 0–0.7)
EOSINOPHIL NFR BLD AUTO: 0.1 %
ERYTHROCYTE [DISTWIDTH] IN BLOOD BY AUTOMATED COUNT: 12.5 % (ref 10–15)
FLUAV+FLUBV AG SPEC QL: NEGATIVE
FLUAV+FLUBV AG SPEC QL: NEGATIVE
GFR SERPL CREATININE-BSD FRML MDRD: 72 ML/MIN/{1.73_M2}
GFR SERPL CREATININE-BSD FRML MDRD: 74 ML/MIN/{1.73_M2}
GFR SERPL CREATININE-BSD FRML MDRD: 75 ML/MIN/{1.73_M2}
GFR SERPL CREATININE-BSD FRML MDRD: 78 ML/MIN/{1.73_M2}
GFR SERPL CREATININE-BSD FRML MDRD: >90 ML/MIN/{1.73_M2}
GLUCOSE BLDC GLUCOMTR-MCNC: 140 MG/DL (ref 70–99)
GLUCOSE BLDC GLUCOMTR-MCNC: 144 MG/DL (ref 70–99)
GLUCOSE BLDC GLUCOMTR-MCNC: 168 MG/DL (ref 70–99)
GLUCOSE BLDC GLUCOMTR-MCNC: 179 MG/DL (ref 70–99)
GLUCOSE BLDC GLUCOMTR-MCNC: 220 MG/DL (ref 70–99)
GLUCOSE BLDC GLUCOMTR-MCNC: 226 MG/DL (ref 70–99)
GLUCOSE BLDC GLUCOMTR-MCNC: 247 MG/DL (ref 70–99)
GLUCOSE BLDC GLUCOMTR-MCNC: 272 MG/DL (ref 70–99)
GLUCOSE BLDC GLUCOMTR-MCNC: 293 MG/DL (ref 70–99)
GLUCOSE BLDC GLUCOMTR-MCNC: 402 MG/DL (ref 70–99)
GLUCOSE BLDC GLUCOMTR-MCNC: 426 MG/DL (ref 70–99)
GLUCOSE BLDC GLUCOMTR-MCNC: 438 MG/DL (ref 70–99)
GLUCOSE BLDC GLUCOMTR-MCNC: 447 MG/DL (ref 70–99)
GLUCOSE BLDC GLUCOMTR-MCNC: 447 MG/DL (ref 70–99)
GLUCOSE BLDC GLUCOMTR-MCNC: 475 MG/DL (ref 70–99)
GLUCOSE BLDC GLUCOMTR-MCNC: 478 MG/DL (ref 70–99)
GLUCOSE SERPL-MCNC: 143 MG/DL (ref 70–99)
GLUCOSE SERPL-MCNC: 271 MG/DL (ref 70–99)
GLUCOSE SERPL-MCNC: 399 MG/DL (ref 70–99)
GLUCOSE SERPL-MCNC: 415 MG/DL (ref 70–99)
GLUCOSE SERPL-MCNC: 444 MG/DL (ref 70–99)
GLUCOSE UR STRIP-MCNC: >499 MG/DL
HBA1C MFR BLD: 9.3 % (ref 0–5.6)
HCO3 BLDV-SCNC: 8 MMOL/L (ref 21–28)
HCT VFR BLD AUTO: 54.8 % (ref 40–53)
HGB BLD-MCNC: 17.7 G/DL (ref 13.3–17.7)
HGB UR QL STRIP: ABNORMAL
IMM GRANULOCYTES # BLD: 0.3 10E9/L (ref 0–0.4)
IMM GRANULOCYTES NFR BLD: 1.6 %
INR PPP: 1.17 (ref 0.86–1.14)
INTERPRETATION ECG - MUSE: NORMAL
KETONES BLD-SCNC: 4.2 MMOL/L (ref 0–0.6)
KETONES BLD-SCNC: 5.1 MMOL/L (ref 0–0.6)
KETONES BLD-SCNC: 5.5 MMOL/L (ref 0–0.6)
KETONES UR STRIP-MCNC: 80 MG/DL
LACTATE BLD-SCNC: 3.5 MMOL/L (ref 0.7–2)
LACTATE BLD-SCNC: 4.4 MMOL/L (ref 0.7–2.1)
LACTATE BLD-SCNC: 5.6 MMOL/L (ref 0.7–2.1)
LEUKOCYTE ESTERASE UR QL STRIP: NEGATIVE
LYMPHOCYTES # BLD AUTO: 1.4 10E9/L (ref 0.8–5.3)
LYMPHOCYTES NFR BLD AUTO: 8.1 %
MAGNESIUM SERPL-MCNC: 2.2 MG/DL (ref 1.6–2.3)
MAGNESIUM SERPL-MCNC: 2.2 MG/DL (ref 1.6–2.3)
MCH RBC QN AUTO: 28.9 PG (ref 26.5–33)
MCHC RBC AUTO-ENTMCNC: 32.3 G/DL (ref 31.5–36.5)
MCV RBC AUTO: 90 FL (ref 78–100)
MONOCYTES # BLD AUTO: 0.8 10E9/L (ref 0–1.3)
MONOCYTES NFR BLD AUTO: 4.8 %
MRSA DNA SPEC QL NAA+PROBE: NEGATIVE
MUCOUS THREADS #/AREA URNS LPF: PRESENT /LPF
NEUTROPHILS # BLD AUTO: 14.7 10E9/L (ref 1.6–8.3)
NEUTROPHILS NFR BLD AUTO: 84.9 %
NITRATE UR QL: NEGATIVE
NRBC # BLD AUTO: 0 10*3/UL
NRBC BLD AUTO-RTO: 0 /100
O2/TOTAL GAS SETTING VFR VENT: ABNORMAL %
OXYHGB MFR BLDV: 53 %
PCO2 BLDV: 15 MM HG (ref 40–50)
PCO2 BLDV: 16 MM HG (ref 40–50)
PCO2 BLDV: 26 MM HG (ref 40–50)
PH BLDV: 6.95 PH (ref 7.32–7.43)
PH BLDV: 6.98 PH (ref 7.32–7.43)
PH BLDV: 7.1 PH (ref 7.32–7.43)
PH UR STRIP: 5 PH (ref 5–7)
PHOSPHATE SERPL-MCNC: 4.9 MG/DL (ref 2.5–4.5)
PLATELET # BLD AUTO: 538 10E9/L (ref 150–450)
PO2 BLDV: 25 MM HG (ref 25–47)
PO2 BLDV: 51 MM HG (ref 25–47)
PO2 BLDV: 52 MM HG (ref 25–47)
POTASSIUM SERPL-SCNC: 3.5 MMOL/L (ref 3.4–5.3)
POTASSIUM SERPL-SCNC: 4.2 MMOL/L (ref 3.4–5.3)
POTASSIUM SERPL-SCNC: 4.5 MMOL/L (ref 3.4–5.3)
POTASSIUM SERPL-SCNC: 4.9 MMOL/L (ref 3.4–5.3)
POTASSIUM SERPL-SCNC: 4.9 MMOL/L (ref 3.4–5.3)
PROT SERPL-MCNC: 8.4 G/DL (ref 6.8–8.8)
RBC # BLD AUTO: 6.12 10E12/L (ref 4.4–5.9)
RBC #/AREA URNS AUTO: <1 /HPF (ref 0–2)
SAO2 % BLDV FROM PO2: 65 %
SAO2 % BLDV FROM PO2: 66 %
SODIUM SERPL-SCNC: 132 MMOL/L (ref 133–144)
SODIUM SERPL-SCNC: 136 MMOL/L (ref 133–144)
SODIUM SERPL-SCNC: 136 MMOL/L (ref 133–144)
SODIUM SERPL-SCNC: 140 MMOL/L (ref 133–144)
SODIUM SERPL-SCNC: 142 MMOL/L (ref 133–144)
SOURCE: ABNORMAL
SP GR UR STRIP: 1.02 (ref 1–1.03)
SPECIMEN SOURCE: NORMAL
SPECIMEN SOURCE: NORMAL
TROPONIN I SERPL-MCNC: 0.02 UG/L (ref 0–0.04)
TROPONIN I SERPL-MCNC: <0.015 UG/L (ref 0–0.04)
UROBILINOGEN UR STRIP-MCNC: 0 MG/DL (ref 0–2)
WBC # BLD AUTO: 17.3 10E9/L (ref 4–11)
WBC #/AREA URNS AUTO: 0 /HPF (ref 0–5)

## 2018-12-31 PROCEDURE — 36415 COLL VENOUS BLD VENIPUNCTURE: CPT | Performed by: INTERNAL MEDICINE

## 2018-12-31 PROCEDURE — 93005 ELECTROCARDIOGRAM TRACING: CPT

## 2018-12-31 PROCEDURE — 85025 COMPLETE CBC W/AUTO DIFF WBC: CPT | Performed by: EMERGENCY MEDICINE

## 2018-12-31 PROCEDURE — 84100 ASSAY OF PHOSPHORUS: CPT | Performed by: EMERGENCY MEDICINE

## 2018-12-31 PROCEDURE — 00000146 ZZHCL STATISTIC GLUCOSE BY METER IP

## 2018-12-31 PROCEDURE — 96365 THER/PROPH/DIAG IV INF INIT: CPT

## 2018-12-31 PROCEDURE — 83605 ASSAY OF LACTIC ACID: CPT | Performed by: INTERNAL MEDICINE

## 2018-12-31 PROCEDURE — 96375 TX/PRO/DX INJ NEW DRUG ADDON: CPT

## 2018-12-31 PROCEDURE — 94660 CPAP INITIATION&MGMT: CPT

## 2018-12-31 PROCEDURE — 71046 X-RAY EXAM CHEST 2 VIEWS: CPT

## 2018-12-31 PROCEDURE — 87086 URINE CULTURE/COLONY COUNT: CPT | Performed by: EMERGENCY MEDICINE

## 2018-12-31 PROCEDURE — 93010 ELECTROCARDIOGRAM REPORT: CPT | Performed by: INTERNAL MEDICINE

## 2018-12-31 PROCEDURE — 84484 ASSAY OF TROPONIN QUANT: CPT | Performed by: INTERNAL MEDICINE

## 2018-12-31 PROCEDURE — 85730 THROMBOPLASTIN TIME PARTIAL: CPT | Performed by: EMERGENCY MEDICINE

## 2018-12-31 PROCEDURE — 87804 INFLUENZA ASSAY W/OPTIC: CPT | Performed by: EMERGENCY MEDICINE

## 2018-12-31 PROCEDURE — 96366 THER/PROPH/DIAG IV INF ADDON: CPT

## 2018-12-31 PROCEDURE — 80053 COMPREHEN METABOLIC PANEL: CPT | Performed by: EMERGENCY MEDICINE

## 2018-12-31 PROCEDURE — 87040 BLOOD CULTURE FOR BACTERIA: CPT | Performed by: EMERGENCY MEDICINE

## 2018-12-31 PROCEDURE — 80048 BASIC METABOLIC PNL TOTAL CA: CPT | Performed by: INTERNAL MEDICINE

## 2018-12-31 PROCEDURE — 83605 ASSAY OF LACTIC ACID: CPT | Performed by: EMERGENCY MEDICINE

## 2018-12-31 PROCEDURE — 25000125 ZZHC RX 250: Performed by: INTERNAL MEDICINE

## 2018-12-31 PROCEDURE — 82010 KETONE BODYS QUAN: CPT | Performed by: INTERNAL MEDICINE

## 2018-12-31 PROCEDURE — 83605 ASSAY OF LACTIC ACID: CPT

## 2018-12-31 PROCEDURE — 96376 TX/PRO/DX INJ SAME DRUG ADON: CPT

## 2018-12-31 PROCEDURE — 25000131 ZZH RX MED GY IP 250 OP 636 PS 637: Performed by: INTERNAL MEDICINE

## 2018-12-31 PROCEDURE — 83735 ASSAY OF MAGNESIUM: CPT | Performed by: EMERGENCY MEDICINE

## 2018-12-31 PROCEDURE — 82803 BLOOD GASES ANY COMBINATION: CPT | Performed by: INTERNAL MEDICINE

## 2018-12-31 PROCEDURE — 99223 1ST HOSP IP/OBS HIGH 75: CPT | Mod: AI | Performed by: INTERNAL MEDICINE

## 2018-12-31 PROCEDURE — 87641 MR-STAPH DNA AMP PROBE: CPT | Performed by: INTERNAL MEDICINE

## 2018-12-31 PROCEDURE — 93005 ELECTROCARDIOGRAM TRACING: CPT | Mod: 76

## 2018-12-31 PROCEDURE — 25000131 ZZH RX MED GY IP 250 OP 636 PS 637: Performed by: EMERGENCY MEDICINE

## 2018-12-31 PROCEDURE — 82803 BLOOD GASES ANY COMBINATION: CPT

## 2018-12-31 PROCEDURE — 80048 BASIC METABOLIC PNL TOTAL CA: CPT | Performed by: EMERGENCY MEDICINE

## 2018-12-31 PROCEDURE — 87088 URINE BACTERIA CULTURE: CPT | Performed by: EMERGENCY MEDICINE

## 2018-12-31 PROCEDURE — 82805 BLOOD GASES W/O2 SATURATION: CPT | Performed by: INTERNAL MEDICINE

## 2018-12-31 PROCEDURE — 99285 EMERGENCY DEPT VISIT HI MDM: CPT | Mod: 25

## 2018-12-31 PROCEDURE — 84484 ASSAY OF TROPONIN QUANT: CPT | Performed by: EMERGENCY MEDICINE

## 2018-12-31 PROCEDURE — 96368 THER/DIAG CONCURRENT INF: CPT

## 2018-12-31 PROCEDURE — 25000128 H RX IP 250 OP 636: Performed by: EMERGENCY MEDICINE

## 2018-12-31 PROCEDURE — 82010 KETONE BODYS QUAN: CPT | Performed by: EMERGENCY MEDICINE

## 2018-12-31 PROCEDURE — 20000003 ZZH R&B ICU

## 2018-12-31 PROCEDURE — 83036 HEMOGLOBIN GLYCOSYLATED A1C: CPT | Performed by: EMERGENCY MEDICINE

## 2018-12-31 PROCEDURE — 25000128 H RX IP 250 OP 636: Performed by: INTERNAL MEDICINE

## 2018-12-31 PROCEDURE — 25000125 ZZHC RX 250: Performed by: EMERGENCY MEDICINE

## 2018-12-31 PROCEDURE — 87640 STAPH A DNA AMP PROBE: CPT | Performed by: INTERNAL MEDICINE

## 2018-12-31 PROCEDURE — 40000275 ZZH STATISTIC RCP TIME EA 10 MIN

## 2018-12-31 PROCEDURE — 87186 SC STD MICRODIL/AGAR DIL: CPT | Performed by: EMERGENCY MEDICINE

## 2018-12-31 PROCEDURE — 83735 ASSAY OF MAGNESIUM: CPT | Performed by: INTERNAL MEDICINE

## 2018-12-31 PROCEDURE — 85610 PROTHROMBIN TIME: CPT | Performed by: EMERGENCY MEDICINE

## 2018-12-31 PROCEDURE — 81001 URINALYSIS AUTO W/SCOPE: CPT | Performed by: EMERGENCY MEDICINE

## 2018-12-31 PROCEDURE — 96361 HYDRATE IV INFUSION ADD-ON: CPT

## 2018-12-31 RX ORDER — POTASSIUM CHLORIDE 1.5 G/1.58G
20-40 POWDER, FOR SOLUTION ORAL
Status: DISCONTINUED | OUTPATIENT
Start: 2018-12-31 | End: 2019-01-04 | Stop reason: HOSPADM

## 2018-12-31 RX ORDER — ONDANSETRON 2 MG/ML
4 INJECTION INTRAMUSCULAR; INTRAVENOUS EVERY 6 HOURS PRN
Status: DISCONTINUED | OUTPATIENT
Start: 2018-12-31 | End: 2019-01-04 | Stop reason: HOSPADM

## 2018-12-31 RX ORDER — POTASSIUM CHLORIDE 7.45 MG/ML
10 INJECTION INTRAVENOUS
Status: DISCONTINUED | OUTPATIENT
Start: 2018-12-31 | End: 2019-01-04 | Stop reason: HOSPADM

## 2018-12-31 RX ORDER — MAGNESIUM SULFATE HEPTAHYDRATE 40 MG/ML
4 INJECTION, SOLUTION INTRAVENOUS EVERY 4 HOURS PRN
Status: DISCONTINUED | OUTPATIENT
Start: 2018-12-31 | End: 2019-01-04 | Stop reason: HOSPADM

## 2018-12-31 RX ORDER — ASPIRIN 81 MG/1
81 TABLET ORAL DAILY
Status: DISCONTINUED | OUTPATIENT
Start: 2018-12-31 | End: 2019-01-04 | Stop reason: HOSPADM

## 2018-12-31 RX ORDER — POTASSIUM CL/LIDO/0.9 % NACL 10MEQ/0.1L
10 INTRAVENOUS SOLUTION, PIGGYBACK (ML) INTRAVENOUS
Status: DISCONTINUED | OUTPATIENT
Start: 2018-12-31 | End: 2019-01-04 | Stop reason: HOSPADM

## 2018-12-31 RX ORDER — AZITHROMYCIN 250 MG/1
250 TABLET, FILM COATED ORAL DAILY
Status: COMPLETED | OUTPATIENT
Start: 2019-01-01 | End: 2019-01-04

## 2018-12-31 RX ORDER — SODIUM CHLORIDE, SODIUM LACTATE, POTASSIUM CHLORIDE, CALCIUM CHLORIDE 600; 310; 30; 20 MG/100ML; MG/100ML; MG/100ML; MG/100ML
INJECTION, SOLUTION INTRAVENOUS CONTINUOUS
Status: DISCONTINUED | OUTPATIENT
Start: 2018-12-31 | End: 2019-01-02

## 2018-12-31 RX ORDER — DEXTROSE MONOHYDRATE 25 G/50ML
25-50 INJECTION, SOLUTION INTRAVENOUS
Status: DISCONTINUED | OUTPATIENT
Start: 2018-12-31 | End: 2019-01-03

## 2018-12-31 RX ORDER — DOXYCYCLINE HYCLATE 100 MG
100 TABLET ORAL 2 TIMES DAILY
Status: ON HOLD | COMMUNITY
Start: 2018-12-27 | End: 2019-01-04

## 2018-12-31 RX ORDER — ONDANSETRON 4 MG/1
4 TABLET, ORALLY DISINTEGRATING ORAL EVERY 6 HOURS PRN
Status: DISCONTINUED | OUTPATIENT
Start: 2018-12-31 | End: 2019-01-04 | Stop reason: HOSPADM

## 2018-12-31 RX ORDER — LEVOTHYROXINE SODIUM 137 UG/1
137 TABLET ORAL
Status: DISCONTINUED | OUTPATIENT
Start: 2018-12-31 | End: 2019-01-04 | Stop reason: HOSPADM

## 2018-12-31 RX ORDER — SODIUM CHLORIDE, SODIUM LACTATE, POTASSIUM CHLORIDE, CALCIUM CHLORIDE 600; 310; 30; 20 MG/100ML; MG/100ML; MG/100ML; MG/100ML
INJECTION, SOLUTION INTRAVENOUS ONCE
Status: COMPLETED | OUTPATIENT
Start: 2018-12-31 | End: 2018-12-31

## 2018-12-31 RX ORDER — CODEINE PHOSPHATE AND GUAIFENESIN 10; 100 MG/5ML; MG/5ML
5 SOLUTION ORAL EVERY 6 HOURS PRN
COMMUNITY
End: 2019-02-01

## 2018-12-31 RX ORDER — LORAZEPAM 2 MG/ML
1 INJECTION INTRAMUSCULAR ONCE
Status: COMPLETED | OUTPATIENT
Start: 2018-12-31 | End: 2018-12-31

## 2018-12-31 RX ORDER — CEFTRIAXONE 2 G/1
2 INJECTION, POWDER, FOR SOLUTION INTRAMUSCULAR; INTRAVENOUS ONCE
Status: COMPLETED | OUTPATIENT
Start: 2018-12-31 | End: 2018-12-31

## 2018-12-31 RX ORDER — POTASSIUM CHLORIDE 1500 MG/1
20-40 TABLET, EXTENDED RELEASE ORAL
Status: DISCONTINUED | OUTPATIENT
Start: 2018-12-31 | End: 2019-01-04 | Stop reason: HOSPADM

## 2018-12-31 RX ORDER — CODEINE PHOSPHATE AND GUAIFENESIN 10; 100 MG/5ML; MG/5ML
10 SOLUTION ORAL EVERY 6 HOURS PRN
Status: DISCONTINUED | OUTPATIENT
Start: 2018-12-31 | End: 2019-01-04 | Stop reason: HOSPADM

## 2018-12-31 RX ORDER — POTASSIUM CHLORIDE 29.8 MG/ML
20 INJECTION INTRAVENOUS
Status: DISCONTINUED | OUTPATIENT
Start: 2018-12-31 | End: 2019-01-04 | Stop reason: HOSPADM

## 2018-12-31 RX ORDER — NALOXONE HYDROCHLORIDE 0.4 MG/ML
.1-.4 INJECTION, SOLUTION INTRAMUSCULAR; INTRAVENOUS; SUBCUTANEOUS
Status: DISCONTINUED | OUTPATIENT
Start: 2018-12-31 | End: 2019-01-04 | Stop reason: HOSPADM

## 2018-12-31 RX ORDER — CEFTRIAXONE 2 G/1
2 INJECTION, POWDER, FOR SOLUTION INTRAMUSCULAR; INTRAVENOUS EVERY 24 HOURS
Status: DISCONTINUED | OUTPATIENT
Start: 2019-01-01 | End: 2019-01-04 | Stop reason: HOSPADM

## 2018-12-31 RX ORDER — NICOTINE POLACRILEX 4 MG
15-30 LOZENGE BUCCAL
Status: DISCONTINUED | OUTPATIENT
Start: 2018-12-31 | End: 2019-01-03

## 2018-12-31 RX ORDER — SODIUM CHLORIDE, SODIUM LACTATE, POTASSIUM CHLORIDE, CALCIUM CHLORIDE 600; 310; 30; 20 MG/100ML; MG/100ML; MG/100ML; MG/100ML
1000 INJECTION, SOLUTION INTRAVENOUS CONTINUOUS
Status: DISCONTINUED | OUTPATIENT
Start: 2018-12-31 | End: 2018-12-31

## 2018-12-31 RX ORDER — DEXTROSE MONOHYDRATE 25 G/50ML
25-50 INJECTION, SOLUTION INTRAVENOUS
Status: DISCONTINUED | OUTPATIENT
Start: 2018-12-31 | End: 2018-12-31

## 2018-12-31 RX ADMIN — SODIUM CHLORIDE: 9 INJECTION, SOLUTION INTRAVENOUS at 14:57

## 2018-12-31 RX ADMIN — SODIUM CHLORIDE 1000 ML: 9 INJECTION, SOLUTION INTRAVENOUS at 16:45

## 2018-12-31 RX ADMIN — SODIUM CHLORIDE, POTASSIUM CHLORIDE, SODIUM LACTATE AND CALCIUM CHLORIDE 1000 ML: 600; 310; 30; 20 INJECTION, SOLUTION INTRAVENOUS at 10:23

## 2018-12-31 RX ADMIN — LORAZEPAM 1 MG: 2 INJECTION, SOLUTION INTRAMUSCULAR; INTRAVENOUS at 14:24

## 2018-12-31 RX ADMIN — SODIUM CHLORIDE: 9 INJECTION, SOLUTION INTRAVENOUS at 16:12

## 2018-12-31 RX ADMIN — SODIUM CHLORIDE 2 UNITS/HR: 9 INJECTION, SOLUTION INTRAVENOUS at 23:56

## 2018-12-31 RX ADMIN — ONDANSETRON 4 MG: 2 INJECTION INTRAMUSCULAR; INTRAVENOUS at 18:41

## 2018-12-31 RX ADMIN — SODIUM CHLORIDE, POTASSIUM CHLORIDE, SODIUM LACTATE AND CALCIUM CHLORIDE 1000 ML: 600; 310; 30; 20 INJECTION, SOLUTION INTRAVENOUS at 15:31

## 2018-12-31 RX ADMIN — SODIUM BICARBONATE 100 MEQ: 84 INJECTION INTRAVENOUS at 11:55

## 2018-12-31 RX ADMIN — SODIUM CHLORIDE: 9 INJECTION, SOLUTION INTRAVENOUS at 11:44

## 2018-12-31 RX ADMIN — SODIUM CHLORIDE, POTASSIUM CHLORIDE, SODIUM LACTATE AND CALCIUM CHLORIDE: 600; 310; 30; 20 INJECTION, SOLUTION INTRAVENOUS at 15:55

## 2018-12-31 RX ADMIN — SODIUM CHLORIDE, POTASSIUM CHLORIDE, SODIUM LACTATE AND CALCIUM CHLORIDE: 600; 310; 30; 20 INJECTION, SOLUTION INTRAVENOUS at 19:28

## 2018-12-31 RX ADMIN — SODIUM BICARBONATE: 84 INJECTION, SOLUTION INTRAVENOUS at 13:10

## 2018-12-31 RX ADMIN — SODIUM CHLORIDE, POTASSIUM CHLORIDE, SODIUM LACTATE AND CALCIUM CHLORIDE 1000 ML: 600; 310; 30; 20 INJECTION, SOLUTION INTRAVENOUS at 10:25

## 2018-12-31 RX ADMIN — SODIUM BICARBONATE: 84 INJECTION, SOLUTION INTRAVENOUS at 17:29

## 2018-12-31 RX ADMIN — SODIUM CHLORIDE: 9 INJECTION, SOLUTION INTRAVENOUS at 18:09

## 2018-12-31 RX ADMIN — CEFTRIAXONE SODIUM 2 G: 2 INJECTION, POWDER, FOR SOLUTION INTRAMUSCULAR; INTRAVENOUS at 10:58

## 2018-12-31 RX ADMIN — SODIUM CHLORIDE, POTASSIUM CHLORIDE, SODIUM LACTATE AND CALCIUM CHLORIDE: 600; 310; 30; 20 INJECTION, SOLUTION INTRAVENOUS at 12:20

## 2018-12-31 ASSESSMENT — ENCOUNTER SYMPTOMS
FREQUENCY: 1
DIAPHORESIS: 1
COUGH: 1
ABDOMINAL PAIN: 0
FEVER: 0
VOMITING: 1
DIARRHEA: 0
WEAKNESS: 1
SHORTNESS OF BREATH: 1
NAUSEA: 1
CHILLS: 0

## 2018-12-31 ASSESSMENT — MIFFLIN-ST. JEOR: SCORE: 2242.56

## 2018-12-31 ASSESSMENT — ACTIVITIES OF DAILY LIVING (ADL)
ADLS_ACUITY_SCORE: 14
ADLS_ACUITY_SCORE: 13

## 2018-12-31 NOTE — H&P
[facility]  Hospitalist Admission Note  December 31, 2018  Name: Karthik Lopez    MRN: [id number]  YOB: 1975    Age: 43 year old  Date of admission: 12/31/2018  Primary care provider: System, Provider Not In      Summary:  Patient is a 43-year-old male with a history of type 1 diabetes and hypothyroidism with recent outpatient diagnosis of pneumonia on doxycycline who presents here with generalized weakness, nausea, and worsening shortness of breath.  He was found to be in severe DKA.    Problem list/Plan:  1. Severe DKA: Initial bicarb was only 4.  He does have some Kussmaul's breathing consistent with DKA.  We will place him on the DKA protocol.  We will continue him on a bicarb drip and follow serial blood glucose per routine and serial BMP.  Suspect that his potassium will drop with correction of his DKA so we will place him on the electrolyte replacement protocol.  Continue him on the insulin drip.  2. Tachypnea: Likely driven by acidosis in the setting of underlying pneumonia.  Suspect this will improve as his acidosis is corrected.  3. Community-acquired pneumonia, recent diagnosis: Was on doxycycline for the last 4 days but will transition to ceftriaxone and azithromycin while inpatient.  4. Hypothyroidism: Continue Synthroid replacement  5. Detectable troponin: No history of coronary artery disease.  We will repeat another one but doubt do not need further inpatient workup unless troponin trends upward.  6. Dehydration, clinically: Aggressive IV fluids as above  7. Fluid/electrolyte/nutrition: Given patient's lethargy and increased work of breathing, will hold off on a diet but if this does improve later this evening, could start him on a diabetic diet.      All lab work and imaging data independently reviewed by myself    Prophylaxis;  Low risk/Ambulation.     Code status: Full code    Discharge: Suspect 2-3 days    Time spent: Greater than 40  minutes  Chief Complaint:   Generalized weakness/nausea/tachypnea   HPI history is assisted by the wife as patient is pretty lethargic and tachypneic  Patient is a 43-year-old male with a history of type 1 diabetes and hypothyroidism who presents here with generalized weakness, nausea, and worsening shortness of breath.  He was in his usual state of health until last Thursday when he was seen in urgent care and was given prescription for doxycycline.  Prior to that he did have some subjective chills as well as shortness of breath and a nonproductive cough.  Since that time, he has been slowly declining where had an episode of emesis yesterday and just was not eating well.  Wife did find out today that he ran out of his test strips so he was not necessarily correcting his sugars and was just taking the Humalog on a random basis with meals but make sure he did not give himself enough to make him hypoglycemic.  Wife is unsure how long he may have just been doing this but he did report that he made an appointment for today to see his primary MD so he can get a refill on his strips.  In any event, he did develop worsening weakness, persistent nausea, and was more notably short of breath.  He is so weak that the wife was unable to drive him and so EMS was called.  On arrival, he was notably tachypneic with Cusmano breathing.  Notably acidemic and despite insulin boluses, he remained acidemic and hyperglycemic.  He was then started on insulin drip.  Because of his persistent acidemia, his bicarbonate was also added.  He will be admitted to the ICU.  Given his dyspnea, they did put him on a trial of BiPAP which he did not tolerate so this was taken off.  I did discuss the case in detail with the ED physician.     Past Medical History:     Past Medical History:   Diagnosis Date     Diabetes (H)      Hypothyroid      Past Surgical History:     Past Surgical History:   Procedure Laterality Date     ORCHIECTOMY INGUINAL CHILD    "    TESTICLE SURGERY       TONSILLECTOMY       Social History:     Social History     Tobacco Use     Smoking status: Never Smoker     Smokeless tobacco: Never Used   Substance Use Topics     Alcohol use: Yes     Comment: once a week     Drug use: No     Family History:  Family history reviewed. NO pertinent family history     Allergies:   No Known Allergies  Medications:     Medications Prior to Admission   Medication Sig Dispense Refill Last Dose     aspirin 81 MG tablet Take 81 mg by mouth daily   12/29/2018 at Unknown time     canaliflozin (INVOKANA) tablet Take 300 mg by mouth every morning (before breakfast)   12/29/2018 at Unknown time     doxycycline hyclate (VIBRA-TABS) 100 MG tablet Take 100 mg by mouth 2 times daily   12/30/2018 at Unknown time     guaiFENesin-codeine (ROBITUSSIN AC) 100-10 MG/5ML solution Take 5 mLs by mouth every 6 hours as needed for cough        insulin glargine (LANTUS) 100 UNIT/ML injection Inject 60 Units Subcutaneous every morning   12/30/2018 at Unknown time     insulin lispro (HUMALOG KWIKPEN) 100 UNIT/ML injection Inject Subcutaneous 3 times daily (with meals) 0.5 unit/carb unit   12/30/2018 at Unknown time     levothyroxine (SYNTHROID/LEVOTHROID) 137 MCG tablet Take 137 mcg by mouth every morning (before breakfast)   12/30/2018 at Unknown time     Multiple Vitamins-Minerals (MULTIVITAMIN MEN) TABS Take 1 tablet by mouth daily   12/29/2018 at Unknown time     Omega-3 Fatty Acids (FISH OIL PO) Take 1 capsule by mouth daily   12/29/2018 at Unknown time       Review of Systems:   A Comprehensive greater than 10 system review of systems was carried out.  Pertinent positives and negatives are noted above.  Otherwise negative for contributory information.        Physical Exam:  Blood pressure (!) 100/28, pulse 144, temperature 98.6  F (37  C), temperature source Axillary, resp. rate (!) 34, height 1.803 m (5' 11\"), weight 131.5 kg (290 lb), SpO2 99 %.  Gen: Appears ill, and notable " tachypnea.   HEENT: NCAT. EOMI. PERRL.  Neck: Normal inspection. No bruit, JVD or thyromegaly.  Lungs: Increased respiratory effort.  Diminished breath sounds at the bases but otherwise clear  Card: N s1s2. RRR. No M/R/G.  Peripheral pulses present and symmetric.   Skin: No rash. Warm to the touch  Extr: No edema. CMS intact  Psychiatric: Patient appears lethargic and ill so orientation questions were deferred.  Neurologic: Cranial nerves II-XII are intact. Motor strength 5/5    Data:     Recent Labs   Lab 12/31/18  0947   WBC 17.3*   HGB 17.7   HCT 54.8*   MCV 90   *     Recent Labs   Lab 12/31/18  1245 12/31/18  0947    132*   POTASSIUM 4.9 4.9   CHLORIDE 103 101   CO2 4* 7*   ANIONGAP 29* 24*   * 415*   BUN 22 21   CR 1.20 1.19   GFRESTIMATED 74 75   GFRESTBLACK 86 87   BAUTISTA 8.0* 8.4*   MAG  --  2.2   PHOS  --  4.9*   PROTTOTAL  --  8.4   ALBUMIN  --  3.8   BILITOTAL  --  0.4   ALKPHOS  --  124   AST  --  15   ALT  --  27     Recent Labs   Lab 12/31/18  1245   TROPI 0.023       Imaging:   Recent Results (from the past 24 hour(s))   XR Chest 2 Views    Narrative    XR CHEST 2 VW 12/31/2018 10:02 AM    HISTORY: Fever. Cough.    COMPARISON: None.      Impression    IMPRESSION: 2 views of the chest show no acute or active  cardiopulmonary disease.     MD Ron RODRIGUEZ MD Pager [phone]

## 2018-12-31 NOTE — PROGRESS NOTES
Respiratory Therapy Note    Patient placed on BiPAP therapy per MD order. Initial settings:    IPAP: 10 cmH20  EPAP: 5 cmH20  PS: 5 cmH20  Rate: 5 bpm  FiO2: 30%    Respiratory rate 30-40s with accessory muscle use, breath sounds diminished, and SpO2 100%. RT to follow.    Shahla Suarez  12:01 PM December 31, 2018

## 2018-12-31 NOTE — LETTER
Transition Communication Hand-off for Care Transitions to Next Level of Care Provider    Name: Karthik Lopez  : 1975  MRN #: [id number]  Primary Care Provider: MENDY Kenney MD  Primary Care MD Name: Dr Bruno  Primary Clinic: [facility] [address] [place] [address]  Primary Care Clinic Name: [facility]   Reason for Hospitalization:  Respiratory alkalosis [E87.3]  Diabetic ketoacidosis without coma associated with type 1 diabetes mellitus (H) [E10.10]  DKA, type 1, not at goal (H) [E10.10]  Admit Date/Time: 2018  9:16 AM  Discharge Date: 19  Payor Source: Payor: [employer] / Plan: [employer] OPEN ACCESS / Product Type: HMO /     Readmission Assessment Measure (TANYA) Risk Score/category: Low           Reason for Communication Hand-off Referral: Avoidable readmission within 30 days    Discharge Plan: Home       Concern for non-adherence with plan of care:   Yes  Discharge Needs Assessment:  Needs      Most Recent Value   # of Referrals Placed by CTS  External Care Coordination, Scheduled Follow-up appointments          Already enrolled in Tele-monitoring program and name of program:  NA  Follow-up specialty is recommended: Yes    Follow-up plan:    Future Appointments   Date Time Provider Department Center   2019  2:30 PM Hattie Hillman Mai, MD [facility]       Any outstanding tests or procedures:         Key Recommendations:   Pt is admitted with DKA with a history of type 1 Diabetes.  He ran out of testing strips for his Accu lucius Plus meter and was guessing his dosing of insulin.  These were filled for patient upon discharge.  Otherwise, he feels he can manage his diabetes.  He carb counts and has insulin.  However, he hasn't seen his endocrinologist in quite some time  And was agreeable to scheduling.  Patient was also recently treated with pneumonia and was discharged with 4 days of oral antibiotic.     Danyelle White & Pauly  Orde    AVS/Discharge Summary is the source of truth; this is a helpful guide for improved communication of patient story

## 2018-12-31 NOTE — ED PROVIDER NOTES
History     Chief Complaint:  Shortness of breath and hyperglycemia    HPI History is limited due to severity of symptoms  Karthik Lopez is a 43 year old male with a history of type 1 diabetes and hypothyroidism who presents to the ED via EMS for evaluation of shortness of breath and hyperglycemia. The patient reports that he had an onset of dry cough, mild shortness of breath, fevers, and fatigue around [place]. 5 days ago, he went to the  where he underwent a chest x-ray and was diagnosed with pneumonia. He was sent home with doxycycline and began feeling improved the next day with a more productive cough. However, then things worsened and his dry cough returned along with vomiting. He has not been taking his insulin as well due to the vomiting; his sugars have been in the 300's. His last dose of antibiotics was 2100 last night; he did take a dose this morning at 0500 but vomited shortly after. This morning, his breathing became very fast, he is urinating more, and is having worsening weakness. He denies any chest pain, leg swelling, diarrhea, abdominal pain, or any other symptoms. He has had no fevers since starting the antibiotics.    Allergies:  No known drug allergies    Medications:    aspirin 81 MG tablet  canaliflozin (INVOKANA) tablet  insulin glargine (LANTUS) 100 UNIT/ML injection  insulin lispro (HUMALOG KWIKPEN) 100 UNIT/ML injection  levothyroxine (SYNTHROID/LEVOTHROID) 137 MCG tablet     Past Medical History:    Type 1 diabetes  Hypothyroidism    Past Surgical History:    Orchiectomy inguinal  Testicle surgery  Tonsillectomy     Family History:    History reviewed. No pertinent family history.     Social History:  Smoking status: Never  Alcohol use: Yes  Marital Status:       Review of Systems   Constitutional: Positive for diaphoresis. Negative for chills and fever.   Respiratory: Positive for cough and shortness of breath.    Cardiovascular: Negative for chest pain and leg  "swelling.   Gastrointestinal: Positive for nausea and vomiting. Negative for abdominal pain and diarrhea.   Genitourinary: Positive for frequency.   Neurological: Positive for weakness.   All other systems reviewed and are negative.    Physical Exam   Patient Vitals for the past 24 hrs:   BP Temp Temp src Pulse Heart Rate Resp SpO2 Height Weight   12/31/18 1310 -- -- -- -- 140 -- 100 % -- --   12/31/18 1250 116/75 -- -- -- 142 (!) 38 100 % -- --   12/31/18 1220 118/65 -- -- -- 141 (!) 34 99 % -- --   12/31/18 1130 (!) 150/97 -- -- 138 138 (!) 38 100 % -- --   12/31/18 1120 (!) 152/94 -- -- -- 141 -- -- -- --   12/31/18 1110 -- -- -- -- 135 (!) 34 100 % -- --   12/31/18 1100 146/87 -- -- 130 140 -- 100 % 1.803 m (5' 11\") 131.5 kg (290 lb)   12/31/18 1015 142/82 -- -- 141 141 (!) 40 99 % -- --   12/31/18 0930 139/89 -- -- 142 144 (!) 44 -- -- --   12/31/18 0925 -- 98.8  F (37.1  C) Oral -- 141 -- -- -- --   12/31/18 0920 129/75 -- -- 142 -- -- -- -- --     Physical Exam    General:   Pleasant, age appropriate toxic appearing male.  HEENT:    Oropharynx is dry  Eyes:    Conjunctiva normal  Neck:    Supple, no meningismus.     CV:     Tachycardic, regular rhythm.      No murmurs, rubs or gallops.       No unilateral leg swelling.       2+ radial pulses bilateral.       No lower extremity edema.  PULM:    Clear to auscultation bilateral.       Kussmaul breathing     Good air exchange.     No rales or wheezing.     No stridor.  ABD:    Soft, non-tender, non-distended.       No pulsatile masses.       No rebound, guarding or rigidity.     No CVA tenderness.  MSK:     No gross deformity to all four extremities.   LYMPH:   No cervical lymphadenopathy.  NEURO:   Alert & O x 3.      No tremor     Good muscle tone, no atrophy.  Skin:    Warm, dry and intact.    Psych:    Anxious        Emergency Department Course   ECG (09:24:51):  Rate 141 bpm. AR interval 132. QRS duration 74. QT/QTc 284/434. P-R-T axes 42 61 40. Sinus " tachycardia. Otherwise normal ECG. Interpreted at 0925 by Todd Cesar MD.    ECG (14:21:33):  Rate 144 bpm. VT interval 120. QRS duration 74. QT/QTc 350/541. P-R-T axes 30 45 33. Sinus tachycardia. Otherwise normal ECG. Interpreted at 1431 by Todd Cesar MD=.    Imaging:  Radiographic findings were communicated with the patient who voiced understanding of the findings.    X-ray Chest, 2 views:  IMPRESSION: 2 views of the chest show no acute or active  cardiopulmonary disease.   Result per radiology.     Laboratory:  1036 - ISTAT Lactate: pH 6.95 (LL), pCO2 16 (LL), pO2 52 (H), Bicarbonate 4 (LL), Lactic Acid 4.4 (HH)  1225 - ISTAT Lactate: pH 6.98 (LL), pCO2 15 (LL), pO2 51 (H), Bicarbonate 4 (LL), Lactic Acid 5.6 (HH)  Influenza A/B antigen: Negative  CBC: WBC 17.3 (H),  (H) o/w WNL (HGB 17.7)  CMP:  (L), Carbon dioxide 7 (LL), Anion gap 24 (H), Glucose 415 (H), Calcium 8.4 (L) o/w WNL (Creatinine 1.19)  BMP: Carbon dioxide 4 (LL), Anion gap 29 (H), Glucose 444 (H), calcium 8.0 (L) o/w WNL (Creatinine 1.20)  Lactic Acid: 3.5 (H)  UA: GLC >499, [place] 80, Moderate blood, Protein albumin 30, Mucous present, o/w Negative  Urine culture: Pending  INR: 1.17 (H)  PTT: 25  Ketone Quant: 5.1 (HH)  Blood Culture x2: Pending  Hemoglobin A1c: 9.3 (H)  Magnesium: 2.2  Phosphorus: 4.9 (H)  1108 - Glucose: 438 (H)  1216 - Glucose: 475 (H)  1243 - Glucose: 447 (H)  1318 - Glucose: 444 (H)  1348 - Glucose: 447 (H)    Interventions:  1025: Lactated ringers 1,000 mL IV Bolus  1058: Rocephin 2 g with  mL IV  1144: Insulin 1 unit/1 mL in NS drip IV  1155: Sodium bicarbonate 8.4% 100 mEq IV injection  1220: Lactated ringers IV infusion  1310: Sodium bicarbonate 150 mEq in 5% dextrose IV infusion  1424: Ativan 1mg IV injection    Emergency Department Course:  The patient arrived in the emergency department via EMS.  Past medical records, nursing notes, and vitals reviewed.  0924: I performed an exam  of the patient and obtained history, as documented above. GCS 15.    IV inserted and blood drawn.    The patient was sent for a xray while in the emergency department, findings above.    1141: I rechecked the patient. Explained findings to patient. I am going to start him on BiPAP.    1214: Patient is not tolerating BiPAP. We will switch to CPAP.    1220: Patient could not tolerate non-invasive ventilation; CPAP/BIPAP discontinued    Findings and plan explained to the Patient who consents to admission.     1248: Discussed the patient with Dr. Akers, who will admit the patient to an ICU bed for further monitoring, evaluation, and treatment.     1419: I rechecked the patient. PH unchanged - increased bicarbonate drip to 200 ml/hr. Patient is now complaining of chest pain and believes he is having a panic attack. I will give Ativan prior to transfer to the floor.    Impression & Plan      Medical Decision Makin-year-old male presented to the ED with preceding symptoms of cough, fever and preceding diagnosis of community acquired pneumonia followed by hyperglycemia and dyspnea.  On presentation, patient's examination was most concerning for DKA plus or minus sepsis.  In regards to his sepsis workup, patient has no evidence of pneumonia that was previously seen on chest x-ray.  No signs of soft tissue infection on exam. Patient was given broad-spectrum antibiotics with ceftriaxone prior to CXR findings.  Lactic acid is elevated but has no overt bacterial infection noted on examination or evaluation. Lactic acid is likely elevated due to intravascular volume depletion and DKA.    Patient does have clear signs of DKA which is stimulated by recent illness as well as patient not taking his insulin secondary to persistent vomiting.  Patient was profoundly acidotic at 6.9 with ketosis and hyperglycemia in the mid 400s.  Patient was given 2 L of IV fluids bolus.  Patient was then started maintenance fluids.  Further IV  fluid bolus not done in attempts to avoid developing cerebral edema.      On reexamination after IV fluids and initiation of insulin drip, patient continued to have Kussmaul breathing.  He was given 2 A of bicarb followed by repeat VBG in which pH was relatively unchanged.  Patient remained tachypneic and tachycardic.  Patient was then written for a bicarb infusion.  I ordered BiPAP for the patient in attempts to decrease the workload of breathing.  He had no respiratory insufficiency, hypoxia or impending signs of airway failure.  BiPAP was provided in attempt to reduce workload of breathing of the patient alone.  Unfortunately he cannot tolerate BIPAP or CPAP at all thus noninvasive ventilation discontinued.      Despite initiation of bicarb drip, patient remained acidotic.  Bicarb infusion was increased from 150 cc/h to 200 cc/h.  He was transferred to the intensive care unit for further evaluation and management of complicated DKA.    Critical Care time:  was 80 minutes for this patient excluding procedures.    Diagnosis:    ICD-10-CM    1. Diabetic ketoacidosis without coma associated with type 1 diabetes mellitus (H) E10.10 Basic metabolic panel (BMP)     Glucose by meter     Glucose by meter   2. Respiratory alkalosis E87.3        Disposition:  Admitted to Dr. Her. Tracie Perdomo  12/31/2018   [facility] EMERGENCY DEPARTMENT  I, Tracie Perdomo, am serving as a scribe at 9:24 AM on 12/31/2018 to document services personally performed by Todd Cesar MD based on my observations and the provider's statements to me.        Todd Cesar MD  12/31/18 1774

## 2019-01-01 LAB
ANION GAP SERPL CALCULATED.3IONS-SCNC: 11 MMOL/L (ref 3–14)
ANION GAP SERPL CALCULATED.3IONS-SCNC: 11 MMOL/L (ref 3–14)
ANION GAP SERPL CALCULATED.3IONS-SCNC: 8 MMOL/L (ref 3–14)
BUN SERPL-MCNC: 15 MG/DL (ref 7–30)
BUN SERPL-MCNC: 15 MG/DL (ref 7–30)
BUN SERPL-MCNC: 16 MG/DL (ref 7–30)
BUN SERPL-MCNC: 17 MG/DL (ref 7–30)
BUN SERPL-MCNC: 19 MG/DL (ref 7–30)
CALCIUM SERPL-MCNC: 7.2 MG/DL (ref 8.5–10.1)
CALCIUM SERPL-MCNC: 7.3 MG/DL (ref 8.5–10.1)
CALCIUM SERPL-MCNC: 7.3 MG/DL (ref 8.5–10.1)
CALCIUM SERPL-MCNC: 7.8 MG/DL (ref 8.5–10.1)
CALCIUM SERPL-MCNC: 7.9 MG/DL (ref 8.5–10.1)
CHLORIDE SERPL-SCNC: 111 MMOL/L (ref 94–109)
CHLORIDE SERPL-SCNC: 112 MMOL/L (ref 94–109)
CHLORIDE SERPL-SCNC: 112 MMOL/L (ref 94–109)
CHLORIDE SERPL-SCNC: 114 MMOL/L (ref 94–109)
CHLORIDE SERPL-SCNC: 114 MMOL/L (ref 94–109)
CO2 BLDCOV-SCNC: 4 MMOL/L (ref 21–28)
CO2 SERPL-SCNC: 20 MMOL/L (ref 20–32)
CO2 SERPL-SCNC: 20 MMOL/L (ref 20–32)
CO2 SERPL-SCNC: 24 MMOL/L (ref 20–32)
CREAT SERPL-MCNC: 0.76 MG/DL (ref 0.66–1.25)
CREAT SERPL-MCNC: 0.76 MG/DL (ref 0.66–1.25)
CREAT SERPL-MCNC: 0.79 MG/DL (ref 0.66–1.25)
CREAT SERPL-MCNC: 0.8 MG/DL (ref 0.66–1.25)
CREAT SERPL-MCNC: 0.87 MG/DL (ref 0.66–1.25)
ERYTHROCYTE [DISTWIDTH] IN BLOOD BY AUTOMATED COUNT: 12.6 % (ref 10–15)
GFR SERPL CREATININE-BSD FRML MDRD: >90 ML/MIN/{1.73_M2}
GLUCOSE BLDC GLUCOMTR-MCNC: 127 MG/DL (ref 70–99)
GLUCOSE BLDC GLUCOMTR-MCNC: 128 MG/DL (ref 70–99)
GLUCOSE BLDC GLUCOMTR-MCNC: 134 MG/DL (ref 70–99)
GLUCOSE BLDC GLUCOMTR-MCNC: 134 MG/DL (ref 70–99)
GLUCOSE BLDC GLUCOMTR-MCNC: 136 MG/DL (ref 70–99)
GLUCOSE BLDC GLUCOMTR-MCNC: 139 MG/DL (ref 70–99)
GLUCOSE BLDC GLUCOMTR-MCNC: 142 MG/DL (ref 70–99)
GLUCOSE BLDC GLUCOMTR-MCNC: 143 MG/DL (ref 70–99)
GLUCOSE BLDC GLUCOMTR-MCNC: 143 MG/DL (ref 70–99)
GLUCOSE BLDC GLUCOMTR-MCNC: 146 MG/DL (ref 70–99)
GLUCOSE BLDC GLUCOMTR-MCNC: 146 MG/DL (ref 70–99)
GLUCOSE BLDC GLUCOMTR-MCNC: 148 MG/DL (ref 70–99)
GLUCOSE BLDC GLUCOMTR-MCNC: 149 MG/DL (ref 70–99)
GLUCOSE BLDC GLUCOMTR-MCNC: 152 MG/DL (ref 70–99)
GLUCOSE BLDC GLUCOMTR-MCNC: 160 MG/DL (ref 70–99)
GLUCOSE BLDC GLUCOMTR-MCNC: 165 MG/DL (ref 70–99)
GLUCOSE BLDC GLUCOMTR-MCNC: 166 MG/DL (ref 70–99)
GLUCOSE BLDC GLUCOMTR-MCNC: 168 MG/DL (ref 70–99)
GLUCOSE BLDC GLUCOMTR-MCNC: 178 MG/DL (ref 70–99)
GLUCOSE BLDC GLUCOMTR-MCNC: 182 MG/DL (ref 70–99)
GLUCOSE BLDC GLUCOMTR-MCNC: 193 MG/DL (ref 70–99)
GLUCOSE BLDC GLUCOMTR-MCNC: 207 MG/DL (ref 70–99)
GLUCOSE BLDC GLUCOMTR-MCNC: 96 MG/DL (ref 70–99)
GLUCOSE SERPL-MCNC: 137 MG/DL (ref 70–99)
GLUCOSE SERPL-MCNC: 153 MG/DL (ref 70–99)
GLUCOSE SERPL-MCNC: 156 MG/DL (ref 70–99)
GLUCOSE SERPL-MCNC: 173 MG/DL (ref 70–99)
GLUCOSE SERPL-MCNC: 181 MG/DL (ref 70–99)
HCT VFR BLD AUTO: 39.4 % (ref 40–53)
HGB BLD-MCNC: 13.8 G/DL (ref 13.3–17.7)
KETONES BLD-SCNC: 2.9 MMOL/L (ref 0–0.6)
KETONES BLD-SCNC: 3.2 MMOL/L (ref 0–0.6)
KETONES BLD-SCNC: 4.6 MMOL/L (ref 0–0.6)
LACTATE BLD-SCNC: 5.2 MMOL/L (ref 0.7–2.1)
MCH RBC QN AUTO: 29.2 PG (ref 26.5–33)
MCHC RBC AUTO-ENTMCNC: 35 G/DL (ref 31.5–36.5)
MCV RBC AUTO: 83 FL (ref 78–100)
PCO2 BLDV: 19 MM HG (ref 40–50)
PH BLDV: 6.94 PH (ref 7.32–7.43)
PLATELET # BLD AUTO: 301 10E9/L (ref 150–450)
POTASSIUM SERPL-SCNC: 3.2 MMOL/L (ref 3.4–5.3)
POTASSIUM SERPL-SCNC: 3.2 MMOL/L (ref 3.4–5.3)
POTASSIUM SERPL-SCNC: 3.5 MMOL/L (ref 3.4–5.3)
POTASSIUM SERPL-SCNC: 3.6 MMOL/L (ref 3.4–5.3)
POTASSIUM SERPL-SCNC: 3.7 MMOL/L (ref 3.4–5.3)
RBC # BLD AUTO: 4.73 10E12/L (ref 4.4–5.9)
SODIUM SERPL-SCNC: 143 MMOL/L (ref 133–144)
SODIUM SERPL-SCNC: 143 MMOL/L (ref 133–144)
SODIUM SERPL-SCNC: 144 MMOL/L (ref 133–144)
SODIUM SERPL-SCNC: 145 MMOL/L (ref 133–144)
SODIUM SERPL-SCNC: 146 MMOL/L (ref 133–144)
WBC # BLD AUTO: 11.6 10E9/L (ref 4–11)

## 2019-01-01 PROCEDURE — 25000132 ZZH RX MED GY IP 250 OP 250 PS 637: Performed by: INTERNAL MEDICINE

## 2019-01-01 PROCEDURE — 00000146 ZZHCL STATISTIC GLUCOSE BY METER IP

## 2019-01-01 PROCEDURE — 85027 COMPLETE CBC AUTOMATED: CPT | Performed by: INTERNAL MEDICINE

## 2019-01-01 PROCEDURE — 25000125 ZZHC RX 250: Performed by: INTERNAL MEDICINE

## 2019-01-01 PROCEDURE — 25000128 H RX IP 250 OP 636: Performed by: INTERNAL MEDICINE

## 2019-01-01 PROCEDURE — 80048 BASIC METABOLIC PNL TOTAL CA: CPT | Performed by: INTERNAL MEDICINE

## 2019-01-01 PROCEDURE — 36415 COLL VENOUS BLD VENIPUNCTURE: CPT | Performed by: INTERNAL MEDICINE

## 2019-01-01 PROCEDURE — 25000131 ZZH RX MED GY IP 250 OP 636 PS 637: Performed by: INTERNAL MEDICINE

## 2019-01-01 PROCEDURE — 99233 SBSQ HOSP IP/OBS HIGH 50: CPT | Performed by: INTERNAL MEDICINE

## 2019-01-01 PROCEDURE — 20000003 ZZH R&B ICU

## 2019-01-01 PROCEDURE — 82010 KETONE BODYS QUAN: CPT | Performed by: INTERNAL MEDICINE

## 2019-01-01 RX ORDER — PROCHLORPERAZINE 25 MG
25 SUPPOSITORY, RECTAL RECTAL EVERY 12 HOURS PRN
Status: DISCONTINUED | OUTPATIENT
Start: 2019-01-01 | End: 2019-01-04 | Stop reason: HOSPADM

## 2019-01-01 RX ORDER — PROCHLORPERAZINE MALEATE 10 MG
10 TABLET ORAL EVERY 6 HOURS PRN
Status: DISCONTINUED | OUTPATIENT
Start: 2019-01-01 | End: 2019-01-04 | Stop reason: HOSPADM

## 2019-01-01 RX ORDER — LIDOCAINE 40 MG/G
CREAM TOPICAL
Status: DISCONTINUED | OUTPATIENT
Start: 2019-01-01 | End: 2019-01-04 | Stop reason: HOSPADM

## 2019-01-01 RX ADMIN — PROCHLORPERAZINE EDISYLATE 10 MG: 5 INJECTION INTRAMUSCULAR; INTRAVENOUS at 11:10

## 2019-01-01 RX ADMIN — Medication 10 MEQ: at 05:14

## 2019-01-01 RX ADMIN — SODIUM CHLORIDE 2 UNITS/HR: 9 INJECTION, SOLUTION INTRAVENOUS at 15:04

## 2019-01-01 RX ADMIN — ONDANSETRON 4 MG: 2 INJECTION INTRAMUSCULAR; INTRAVENOUS at 23:28

## 2019-01-01 RX ADMIN — Medication 10 MEQ: at 13:03

## 2019-01-01 RX ADMIN — SODIUM CHLORIDE, POTASSIUM CHLORIDE, SODIUM LACTATE AND CALCIUM CHLORIDE: 600; 310; 30; 20 INJECTION, SOLUTION INTRAVENOUS at 23:06

## 2019-01-01 RX ADMIN — SODIUM CHLORIDE, POTASSIUM CHLORIDE, SODIUM LACTATE AND CALCIUM CHLORIDE: 600; 310; 30; 20 INJECTION, SOLUTION INTRAVENOUS at 10:37

## 2019-01-01 RX ADMIN — ONDANSETRON 4 MG: 2 INJECTION INTRAMUSCULAR; INTRAVENOUS at 00:37

## 2019-01-01 RX ADMIN — AZITHROMYCIN MONOHYDRATE 250 MG: 250 TABLET ORAL at 09:22

## 2019-01-01 RX ADMIN — GUAIFENESIN AND CODEINE PHOSPHATE 10 ML: 10; 100 LIQUID ORAL at 20:49

## 2019-01-01 RX ADMIN — Medication 10 MEQ: at 14:42

## 2019-01-01 RX ADMIN — ONDANSETRON 4 MG: 2 INJECTION INTRAMUSCULAR; INTRAVENOUS at 06:20

## 2019-01-01 RX ADMIN — PROCHLORPERAZINE EDISYLATE 10 MG: 5 INJECTION INTRAMUSCULAR; INTRAVENOUS at 20:15

## 2019-01-01 RX ADMIN — Medication 10 MEQ: at 03:15

## 2019-01-01 RX ADMIN — Medication 10 MEQ: at 06:22

## 2019-01-01 RX ADMIN — Medication 10 MEQ: at 15:54

## 2019-01-01 RX ADMIN — SODIUM CHLORIDE, POTASSIUM CHLORIDE, SODIUM LACTATE AND CALCIUM CHLORIDE: 600; 310; 30; 20 INJECTION, SOLUTION INTRAVENOUS at 05:13

## 2019-01-01 RX ADMIN — Medication 10 MEQ: at 17:34

## 2019-01-01 RX ADMIN — GUAIFENESIN AND CODEINE PHOSPHATE 10 ML: 10; 100 LIQUID ORAL at 07:44

## 2019-01-01 RX ADMIN — ONDANSETRON 4 MG: 2 INJECTION INTRAMUSCULAR; INTRAVENOUS at 17:44

## 2019-01-01 RX ADMIN — GUAIFENESIN AND CODEINE PHOSPHATE 10 ML: 10; 100 LIQUID ORAL at 00:46

## 2019-01-01 RX ADMIN — SODIUM CHLORIDE, POTASSIUM CHLORIDE, SODIUM LACTATE AND CALCIUM CHLORIDE: 600; 310; 30; 20 INJECTION, SOLUTION INTRAVENOUS at 00:25

## 2019-01-01 RX ADMIN — LEVOTHYROXINE SODIUM 137 MCG: 137 TABLET ORAL at 07:43

## 2019-01-01 RX ADMIN — CEFTRIAXONE SODIUM 2 G: 2 INJECTION, POWDER, FOR SOLUTION INTRAMUSCULAR; INTRAVENOUS at 10:37

## 2019-01-01 RX ADMIN — SODIUM BICARBONATE: 84 INJECTION, SOLUTION INTRAVENOUS at 07:31

## 2019-01-01 RX ADMIN — ASPIRIN 81 MG: 81 TABLET, COATED ORAL at 09:22

## 2019-01-01 RX ADMIN — Medication 10 MEQ: at 04:19

## 2019-01-01 RX ADMIN — SODIUM BICARBONATE: 84 INJECTION, SOLUTION INTRAVENOUS at 00:25

## 2019-01-01 ASSESSMENT — ACTIVITIES OF DAILY LIVING (ADL)
ADLS_ACUITY_SCORE: 14
ADLS_ACUITY_SCORE: 13
ADLS_ACUITY_SCORE: 13
ADLS_ACUITY_SCORE: 14
ADLS_ACUITY_SCORE: 11
ADLS_ACUITY_SCORE: 14

## 2019-01-01 NOTE — PROGRESS NOTES
43-year-old gentleman with history of diabetes who presents in DKA secondary to pneumonia.  When I examined the patient this a.m. he is awake alert hemodynamically stable and has no complaints.  He continues to be progressing as expected.  Patient is being managed by the hospitalist service and there are no acute critical care needs at this time.  I will continue to follow with the patient in the periphery and be available if there is any critical care needs that arise.    Rodolfo Paz MD  Critical Care Services

## 2019-01-01 NOTE — PLAN OF CARE
ICU End of Shift Summary.  For vital signs and complete assessments, please see documentation flowsheets.     Pertinent assessments: Patient alert, arrouses easily. Falls asleep between cares. Remains on insulin drip  for blood sugar control.  Major Shift Events: small emesis after coughing, patient states coughing triggered his gag reflex.  Plan (Upcoming Events): coontinue to monitor patient  Discharge/Transfer Needs: home    Bedside Shift Report Completed : yes  Bedside Safety Check Completed:yes

## 2019-01-01 NOTE — PROGRESS NOTES
[facility]  Hospitalist Progress Note  Name: Karthik Lopez    MRN: [id number]  YOB: 1975    Age: 43 year old  Date of admission: 12/31/2018  Primary care provider: System, Provider Not In      Reason for Stay (Diagnosis): Acute severe DKA         Assessment and Plan:      Summary of Stay:  Patient is a 43-year-old male with a history of type 1 diabetes and hypothyroidism with recent outpatient diagnosis of pneumonia on doxycycline who presents here with generalized weakness, nausea, and worsening shortness of breath.  He was found to be in severe DKA.        Problem List/Plan:  1. Acute severe DKA/type 1 diabetes: Likely due to poor adherence to insulin and underlying pneumonia.  Per wife's report, patient finally admitted on the morning of admission that he had run out of test strips and was just guessing his NovoLog dosing just so as long as he did not feel hypoglycemic and did not do a correction scale.  Clinically improving now.  Bicarb is normalized along with normal anion gap.  Will discontinue bicarb drip and decrease LR to 150 mL/hour.  Continue insulin drip until he corrects his DKA.  Continue to monitor serum ketones and electrolytes.  2. Tachypnea with Kussmaul breathing on presentation: Improved after correction of acidosis.  3. Community-acquired pneumonia: Recent diagnosis and was on doxycycline 4 days prior to admission.  Now on ceftriaxone and azithromycin while inpatient.  4. Hypothyroidism: Continue Synthroid replacement  5. Detectable troponin: Probably present on ED workup.  Suspect maybe some mild degree of myocardial strain but clinical suspicion low for an ACS.  Follow-up troponin was nondetectable.  No further inpatient workup at this time.      DVT Prophylaxis: Low Risk/Ambulatory with no VTE prophylaxis indicated  Code Status: Full Code  Discharge Dispo: Home with wife  Estimated Disch Date / # of Days until Disch: Suspect tomorrow at the  "earliest if his DKA resolves and patient is able to tolerate adequate p.o.  Do recommend that he goes home with insulin test strips and encourage compliance with his insulin regimen, including a correction scale.    Wife at the bedside and updated with plan of cares  Time spent: Greater than 35 minutes      Interval History (Subjective):      Feeling a bit better but still fatigued.  Denies any chest pain or shortness of breath         Physical Exam:      Vital signs:  Temp: 97.8  F (36.6  C) Temp src: Oral BP: 144/89 Pulse: 93 Heart Rate: 98 Resp: 14 SpO2: 100 % O2 Device: None (Room air) Oxygen Delivery: 2 LPM Height: 180.3 cm (5' 11\") Weight: 131.5 kg (290 lb)  Estimated body mass index is 40.45 kg/m  as calculated from the following:    Height as of this encounter: 1.803 m (5' 11\").    Weight as of this encounter: 131.5 kg (290 lb).    I/O last 3 completed shifts:  In: 4694.17 [I.V.:4694.17]  Out: 2850 [Urine:2850]  Vitals:    12/31/18 1100   Weight: 131.5 kg (290 lb)       Constitutional:  Still appears somnolent and fatigued.  Otherwise, cooperative, no apparent distress   Respiratory: Nl work of breathing. Clear to auscultation bilaterally, no crackles or wheezing   Cardiovascular: Regular rate and rhythm, normal S1 and S2, and no murmur noted       Skin: No rashes, no cyanosis, dry to touch   Neuro: CN 2-12 intact, no localizing weakness   Extremities: No edema, normal range of motion   HEENT Normocephalic, atraumatic, normal nasal turbinates; oropharynx clear   Neck Supple; nl inspection; trachea midline; no thryomegaly   Psychiatric: A+O x3.  Flat affect          Medications:      All current medications were reviewed with changes reflected in problem list.         Data:      All new lab and imaging data was reviewed.   Labs:  Recent Labs   Lab 12/31/18  0946   CULT Culture in progress  No growth after 16 hours  No growth after 16 hours     Recent Labs   Lab 01/01/19  0612 12/31/18  0947   WBC 11.6* " 17.3*   HGB 13.8 17.7   HCT 39.4* 54.8*   MCV 83 90    538*     Recent Labs   Lab 01/01/19  0612 01/01/19  0156 12/31/18  2206 12/31/18  1753  12/31/18  0947    143 142 140   < > 132*   POTASSIUM 3.7 3.2* 3.5 4.2   < > 4.9   CHLORIDE 111* 112* 114* 109   < > 101   CO2 24 20 14* 5*   < > 7*   ANIONGAP 8 11 14 26*   < > 24*   * 156* 143* 271*   < > 415*   BUN 17 19 20 23   < > 21   CR 0.79 0.87 0.85 1.16   < > 1.19   GFRESTIMATED >90 >90 >90 78   < > 75   GFRESTBLACK >90 >90 >90 90   < > 87   BAUTISTA 7.3* 7.3* 7.5* 8.1*   < > 8.4*   MAG  --   --   --  2.2  --  2.2   PHOS  --   --   --   --   --  4.9*   PROTTOTAL  --   --   --   --   --  8.4   ALBUMIN  --   --   --   --   --  3.8   BILITOTAL  --   --   --   --   --  0.4   ALKPHOS  --   --   --   --   --  124   AST  --   --   --   --   --  15   ALT  --   --   --   --   --  27    < > = values in this interval not displayed.      Imaging:   No results found for this or any previous visit (from the past 24 hour(s)).    Ron Akers -[phone]

## 2019-01-01 NOTE — PROVIDER NOTIFICATION
"Paged MD \"Emesis again at this time. Zofran not due. No other PRNs noted. Please advise. Thanks.\"   "

## 2019-01-02 LAB
ANION GAP SERPL CALCULATED.3IONS-SCNC: 10 MMOL/L (ref 3–14)
ANION GAP SERPL CALCULATED.3IONS-SCNC: 13 MMOL/L (ref 3–14)
ANION GAP SERPL CALCULATED.3IONS-SCNC: 15 MMOL/L (ref 3–14)
BASE DEFICIT BLDV-SCNC: 4.3 MMOL/L
BUN SERPL-MCNC: 13 MG/DL (ref 7–30)
BUN SERPL-MCNC: 14 MG/DL (ref 7–30)
BUN SERPL-MCNC: 14 MG/DL (ref 7–30)
CALCIUM SERPL-MCNC: 7.6 MG/DL (ref 8.5–10.1)
CALCIUM SERPL-MCNC: 7.6 MG/DL (ref 8.5–10.1)
CALCIUM SERPL-MCNC: 7.8 MG/DL (ref 8.5–10.1)
CHLORIDE SERPL-SCNC: 112 MMOL/L (ref 94–109)
CHLORIDE SERPL-SCNC: 113 MMOL/L (ref 94–109)
CHLORIDE SERPL-SCNC: 116 MMOL/L (ref 94–109)
CO2 SERPL-SCNC: 16 MMOL/L (ref 20–32)
CO2 SERPL-SCNC: 19 MMOL/L (ref 20–32)
CO2 SERPL-SCNC: 21 MMOL/L (ref 20–32)
CREAT SERPL-MCNC: 0.74 MG/DL (ref 0.66–1.25)
CREAT SERPL-MCNC: 0.75 MG/DL (ref 0.66–1.25)
CREAT SERPL-MCNC: 0.78 MG/DL (ref 0.66–1.25)
ERYTHROCYTE [DISTWIDTH] IN BLOOD BY AUTOMATED COUNT: 13.1 % (ref 10–15)
GFR SERPL CREATININE-BSD FRML MDRD: >90 ML/MIN/{1.73_M2}
GLUCOSE BLDC GLUCOMTR-MCNC: 111 MG/DL (ref 70–99)
GLUCOSE BLDC GLUCOMTR-MCNC: 145 MG/DL (ref 70–99)
GLUCOSE BLDC GLUCOMTR-MCNC: 148 MG/DL (ref 70–99)
GLUCOSE BLDC GLUCOMTR-MCNC: 154 MG/DL (ref 70–99)
GLUCOSE BLDC GLUCOMTR-MCNC: 154 MG/DL (ref 70–99)
GLUCOSE BLDC GLUCOMTR-MCNC: 156 MG/DL (ref 70–99)
GLUCOSE BLDC GLUCOMTR-MCNC: 160 MG/DL (ref 70–99)
GLUCOSE BLDC GLUCOMTR-MCNC: 163 MG/DL (ref 70–99)
GLUCOSE BLDC GLUCOMTR-MCNC: 163 MG/DL (ref 70–99)
GLUCOSE BLDC GLUCOMTR-MCNC: 164 MG/DL (ref 70–99)
GLUCOSE BLDC GLUCOMTR-MCNC: 167 MG/DL (ref 70–99)
GLUCOSE BLDC GLUCOMTR-MCNC: 167 MG/DL (ref 70–99)
GLUCOSE BLDC GLUCOMTR-MCNC: 174 MG/DL (ref 70–99)
GLUCOSE BLDC GLUCOMTR-MCNC: 178 MG/DL (ref 70–99)
GLUCOSE BLDC GLUCOMTR-MCNC: 186 MG/DL (ref 70–99)
GLUCOSE BLDC GLUCOMTR-MCNC: 188 MG/DL (ref 70–99)
GLUCOSE BLDC GLUCOMTR-MCNC: 193 MG/DL (ref 70–99)
GLUCOSE BLDC GLUCOMTR-MCNC: 223 MG/DL (ref 70–99)
GLUCOSE BLDC GLUCOMTR-MCNC: 231 MG/DL (ref 70–99)
GLUCOSE BLDC GLUCOMTR-MCNC: 242 MG/DL (ref 70–99)
GLUCOSE BLDC GLUCOMTR-MCNC: 243 MG/DL (ref 70–99)
GLUCOSE BLDC GLUCOMTR-MCNC: 283 MG/DL (ref 70–99)
GLUCOSE BLDC GLUCOMTR-MCNC: 349 MG/DL (ref 70–99)
GLUCOSE SERPL-MCNC: 164 MG/DL (ref 70–99)
GLUCOSE SERPL-MCNC: 221 MG/DL (ref 70–99)
GLUCOSE SERPL-MCNC: 320 MG/DL (ref 70–99)
HBA1C MFR BLD: 9.4 % (ref 0–5.6)
HCO3 BLDV-SCNC: 20 MMOL/L (ref 21–28)
HCT VFR BLD AUTO: 39.2 % (ref 40–53)
HGB BLD-MCNC: 13.6 G/DL (ref 13.3–17.7)
INTERPRETATION ECG - MUSE: NORMAL
MCH RBC QN AUTO: 29.5 PG (ref 26.5–33)
MCHC RBC AUTO-ENTMCNC: 34.7 G/DL (ref 31.5–36.5)
MCV RBC AUTO: 85 FL (ref 78–100)
O2/TOTAL GAS SETTING VFR VENT: ABNORMAL %
OXYHGB MFR BLDV: 66 %
PCO2 BLDV: 36 MM HG (ref 40–50)
PH BLDV: 7.36 PH (ref 7.32–7.43)
PHOSPHATE SERPL-MCNC: 1.9 MG/DL (ref 2.5–4.5)
PLATELET # BLD AUTO: 264 10E9/L (ref 150–450)
PO2 BLDV: 34 MM HG (ref 25–47)
POTASSIUM SERPL-SCNC: 3.4 MMOL/L (ref 3.4–5.3)
POTASSIUM SERPL-SCNC: 3.6 MMOL/L (ref 3.4–5.3)
POTASSIUM SERPL-SCNC: 3.6 MMOL/L (ref 3.4–5.3)
RBC # BLD AUTO: 4.61 10E12/L (ref 4.4–5.9)
SODIUM SERPL-SCNC: 143 MMOL/L (ref 133–144)
SODIUM SERPL-SCNC: 144 MMOL/L (ref 133–144)
SODIUM SERPL-SCNC: 148 MMOL/L (ref 133–144)
WBC # BLD AUTO: 9.6 10E9/L (ref 4–11)

## 2019-01-02 PROCEDURE — 27210995 ZZH RX 272: Performed by: INTERNAL MEDICINE

## 2019-01-02 PROCEDURE — 36415 COLL VENOUS BLD VENIPUNCTURE: CPT | Performed by: INTERNAL MEDICINE

## 2019-01-02 PROCEDURE — 84100 ASSAY OF PHOSPHORUS: CPT | Performed by: INTERNAL MEDICINE

## 2019-01-02 PROCEDURE — 25000131 ZZH RX MED GY IP 250 OP 636 PS 637: Performed by: INTERNAL MEDICINE

## 2019-01-02 PROCEDURE — 00000146 ZZHCL STATISTIC GLUCOSE BY METER IP

## 2019-01-02 PROCEDURE — 25000128 H RX IP 250 OP 636: Performed by: INTERNAL MEDICINE

## 2019-01-02 PROCEDURE — 80048 BASIC METABOLIC PNL TOTAL CA: CPT | Performed by: INTERNAL MEDICINE

## 2019-01-02 PROCEDURE — 85027 COMPLETE CBC AUTOMATED: CPT | Performed by: INTERNAL MEDICINE

## 2019-01-02 PROCEDURE — 25000132 ZZH RX MED GY IP 250 OP 250 PS 637: Performed by: INTERNAL MEDICINE

## 2019-01-02 PROCEDURE — 99233 SBSQ HOSP IP/OBS HIGH 50: CPT | Performed by: INTERNAL MEDICINE

## 2019-01-02 PROCEDURE — 82805 BLOOD GASES W/O2 SATURATION: CPT | Performed by: INTERNAL MEDICINE

## 2019-01-02 PROCEDURE — 83036 HEMOGLOBIN GLYCOSYLATED A1C: CPT | Performed by: INTERNAL MEDICINE

## 2019-01-02 PROCEDURE — 20000003 ZZH R&B ICU

## 2019-01-02 RX ORDER — SODIUM CHLORIDE AND POTASSIUM CHLORIDE 150; 450 MG/100ML; MG/100ML
INJECTION, SOLUTION INTRAVENOUS CONTINUOUS
Status: DISCONTINUED | OUTPATIENT
Start: 2019-01-02 | End: 2019-01-04 | Stop reason: HOSPADM

## 2019-01-02 RX ORDER — DEXTROSE MONOHYDRATE, SODIUM CHLORIDE, AND POTASSIUM CHLORIDE 50; 1.49; 4.5 G/1000ML; G/1000ML; G/1000ML
INJECTION, SOLUTION INTRAVENOUS CONTINUOUS
Status: DISCONTINUED | OUTPATIENT
Start: 2019-01-02 | End: 2019-01-03

## 2019-01-02 RX ORDER — DEXTROSE MONOHYDRATE 25 G/50ML
25-50 INJECTION, SOLUTION INTRAVENOUS
Status: DISCONTINUED | OUTPATIENT
Start: 2019-01-02 | End: 2019-01-02

## 2019-01-02 RX ORDER — DEXTROSE MONOHYDRATE 25 G/50ML
25-50 INJECTION, SOLUTION INTRAVENOUS
Status: DISCONTINUED | OUTPATIENT
Start: 2019-01-02 | End: 2019-01-04 | Stop reason: HOSPADM

## 2019-01-02 RX ORDER — SODIUM CHLORIDE 450 MG/100ML
INJECTION, SOLUTION INTRAVENOUS CONTINUOUS
Status: DISCONTINUED | OUTPATIENT
Start: 2019-01-02 | End: 2019-01-02

## 2019-01-02 RX ORDER — NICOTINE POLACRILEX 4 MG
15-30 LOZENGE BUCCAL
Status: DISCONTINUED | OUTPATIENT
Start: 2019-01-02 | End: 2019-01-04 | Stop reason: HOSPADM

## 2019-01-02 RX ORDER — NICOTINE POLACRILEX 4 MG
15-30 LOZENGE BUCCAL
Status: DISCONTINUED | OUTPATIENT
Start: 2019-01-02 | End: 2019-01-02

## 2019-01-02 RX ADMIN — SODIUM CHLORIDE 2.5 UNITS/HR: 9 INJECTION, SOLUTION INTRAVENOUS at 16:35

## 2019-01-02 RX ADMIN — INSULIN ASPART 2 UNITS: 100 INJECTION, SOLUTION INTRAVENOUS; SUBCUTANEOUS at 10:45

## 2019-01-02 RX ADMIN — INSULIN GLARGINE 35 UNITS: 100 INJECTION, SOLUTION SUBCUTANEOUS at 10:00

## 2019-01-02 RX ADMIN — SODIUM CHLORIDE, POTASSIUM CHLORIDE, SODIUM LACTATE AND CALCIUM CHLORIDE: 600; 310; 30; 20 INJECTION, SOLUTION INTRAVENOUS at 05:39

## 2019-01-02 RX ADMIN — ASPIRIN 81 MG: 81 TABLET, COATED ORAL at 08:45

## 2019-01-02 RX ADMIN — CEFTRIAXONE SODIUM 2 G: 2 INJECTION, POWDER, FOR SOLUTION INTRAMUSCULAR; INTRAVENOUS at 11:26

## 2019-01-02 RX ADMIN — INSULIN ASPART 6 UNITS: 100 INJECTION, SOLUTION INTRAVENOUS; SUBCUTANEOUS at 13:19

## 2019-01-02 RX ADMIN — SODIUM CHLORIDE: 4.5 INJECTION, SOLUTION INTRAVENOUS at 13:58

## 2019-01-02 RX ADMIN — LEVOTHYROXINE SODIUM 137 MCG: 137 TABLET ORAL at 08:45

## 2019-01-02 RX ADMIN — AZITHROMYCIN MONOHYDRATE 250 MG: 250 TABLET ORAL at 08:45

## 2019-01-02 RX ADMIN — POTASSIUM CHLORIDE AND SODIUM CHLORIDE: 450; 150 INJECTION, SOLUTION INTRAVENOUS at 16:36

## 2019-01-02 ASSESSMENT — ACTIVITIES OF DAILY LIVING (ADL)
ADLS_ACUITY_SCORE: 12

## 2019-01-02 NOTE — PROGRESS NOTES
Discharge Planner   Discharge Plans in progress: YES  Barriers to discharge plan: none  Follow up plan: pt needs testing strips accu lucius plus filled for glucometer at [facility] [place].  Pt is scheduled for f/u with endo this Friday .        Entered by: Danyelle White 01/02/2019 2:49 PM

## 2019-01-02 NOTE — PLAN OF CARE
ICU End of Shift Summary.  For vital signs and complete assessments, please see documentation flowsheets.     Pertinent assessments: AOx4. Denies pain. TMAX 100.3 Tele SR/ST. BP wnl. Lungs diminished. RA. Home CPAP in room. Intermittent nausea. Emesis x1. Voiding in urinal.   Major Shift Events: emesis x1  Plan (Upcoming Events): follow blood sugars and replace lytes as needed  Discharge/Transfer Needs: Home    Bedside Shift Report Completed : Y  Bedside Safety Check Completed: Y

## 2019-01-02 NOTE — PROGRESS NOTES
RT Note      Patient has been having emesis. Will check back later for home CPAP setup and use.      Sara Trujillo, RRT

## 2019-01-02 NOTE — PROGRESS NOTES
[facility]    Medicine Progress Note - Hospitalist Service       Date of Admission:  12/31/2018  Assessment & Plan       Summary of Stay:  Patient is a 43-year-old male with a history of type 1 diabetes and hypothyroidism with recent outpatient diagnosis of pneumonia on doxycycline who presents here with generalized weakness, nausea, and worsening shortness of breath.  He was found to be in severe DKA.           Problem List/Plan:  1. Acute severe DKA/type 1 diabetes: Likely due to poor adherence to insulin and underlying pneumonia.  Per wife's report, patient finally admitted on the morning of admission that he had run out of test strips and was just guessing his NovoLog dosing just so as long as he did not feel hypoglycemic and did not do a correction scale.  Clinically improving now.  switch to subcutaneous insulin and pre-prandial insulin along with sliding scale insulin.  2. Tachypnea with Kussmaul breathing on presentation: Improved after correction of acidosis.  3. Community-acquired pneumonia: Recent diagnosis and was on doxycycline 4 days prior to admission.  Now on ceftriaxone and azithromycin while inpatient.  4. Hypothyroidism: Continue Synthroid replacement  5. Detectable troponin: Probably present on ED workup.  Suspect maybe some mild degree of myocardial strain but clinical suspicion low for an ACS.  Follow-up troponin was nondetectable.  No further inpatient workup at this time.    Addendum.  - Transitioned patient to subcutaneous insulin, however, labs show him flipping back to DKA, hence, will start back insulin gtt per DKA protocol.        Diet: Room Service  Moderate Consistent CHO Diet    DVT Prophylaxis: Pneumatic Compression Devices  Heard Catheter: not present  Code Status: Full Code      Disposition Plan   Expected discharge: Tomorrow, recommended to prior living arrangement once glucose stable.  Entered: Herson Price MD 01/02/2019, 9:06 AM       The patient's care was  discussed with the Patient.    Herson Price MD  Hospitalist Service  [facility]    ______________________________________________________________________    Interval History     + thirsty. + nausea and emesis last night. No abdominal pain/chest pain/SOB. + LGF overnight.    Data reviewed today: I reviewed all medications, new labs and imaging results over the last 24 hours. I personally reviewed no images or EKG's today.    Physical Exam   Vital Signs: Temp: 98.9  F (37.2  C) Temp src: Oral BP: 147/86 Pulse: 79 Heart Rate: 76 Resp: 21 SpO2: 97 % O2 Device: None (Room air)    Weight: 290 lbs 0 oz    Gen - AAO x 3 in NAD.  Lungs - CTA B.  Heart - RR,S1+S2 nml, no m/g/r.  Abd - soft, NT, ND, + BS.  Ext - no edema.    Data   Recent Labs   Lab 01/02/19  0525 01/01/19  2157 01/01/19  1640  01/01/19  0612  12/31/18  1511 12/31/18  1245 12/31/18  0947   WBC 9.6  --   --   --  11.6*  --   --   --  17.3*   HGB 13.6  --   --   --  13.8  --   --   --  17.7   MCV 85  --   --   --  83  --   --   --  90     --   --   --  301  --   --   --  538*   INR  --   --   --   --   --   --   --   --  1.17*   * 146* 145*   < > 143   < > 136 136 132*   POTASSIUM 3.4 3.5 3.6   < > 3.7   < > 4.5 4.9 4.9   CHLORIDE 116* 114* 114*   < > 111*   < > 104 103 101   CO2 19* 24 20   < > 24   < > 3* 4* 7*   BUN 14 15 15   < > 17   < > 23 22 21   CR 0.78 0.80 0.76   < > 0.79   < > 1.24 1.20 1.19   ANIONGAP 13 8 11   < > 8   < > 29* 29* 24*   BAUTISTA 7.8* 7.9* 7.2*   < > 7.3*   < > 7.9* 8.0* 8.4*   * 181* 137*   < > 153*   < > 399* 444* 415*   ALBUMIN  --   --   --   --   --   --   --   --  3.8   PROTTOTAL  --   --   --   --   --   --   --   --  8.4   BILITOTAL  --   --   --   --   --   --   --   --  0.4   ALKPHOS  --   --   --   --   --   --   --   --  124   ALT  --   --   --   --   --   --   --   --  27   AST  --   --   --   --   --   --   --   --  15   TROPI  --   --   --   --   --   --  <0.015 0.023  --     < > =  values in this interval not displayed.     No results found for this or any previous visit (from the past 24 hour(s)).  Medications     insulin (regular) 1 mL/hr at 01/02/19 0800       aspirin  81 mg Oral Daily     azithromycin  250 mg Oral Daily     cefTRIAXone  2 g Intravenous Q24H     insulin aspart   Subcutaneous TID AC     insulin aspart  1-10 Units Subcutaneous TID AC     insulin aspart  1-7 Units Subcutaneous At Bedtime     insulin glargine  60 Units Subcutaneous QAM     levothyroxine  137 mcg Oral QAM AC     sodium chloride (PF)  3 mL Intracatheter Q8H

## 2019-01-02 NOTE — DISCHARGE INSTRUCTIONS
Dr. Med Kenney-Wednesday January 23rd at 9:15.     Internal medicine - endocrinology, diabetes & metabolism   [address] [place] [address] [address], [place], [place] [id number] [phone]

## 2019-01-02 NOTE — CONSULTS
Care Transition Initial Assessment - RN        Met with: Patient and wife.  DATA   Active Problems:    DKA (diabetic ketoacidosis) (H)       Cognitive Status: awake, alert and oriented.  Primary Care Clinic Name: [facility]   Primary Care MD Name: Dr Bruno  Contact information and PCP information verified: Yes, pt follows with endo.  He doesn't have an internal medicine MD  Lives With: spouse             Who is your support system?: Wife       Insurance concerns: No Insurance issues identified  ASSESSMENT  Patient currently receives the following services:  none        Identified issues/concerns regarding health management: Pt is admitted with DKA.  He ran out of testing strips for his Accu lucius Plus meter and was guessing his dosing of insulin.  Pt requests a refill . He uses [facility] [place] [facility].  This will need to be filled at time of discharge.  Otherwise, he feels he can manage his diabetes.  He carb counts and has insulin.  However, he hasn't seen his endocrinologist in quite some time . He is agreeable that I schedule him for follow up since he is off of work.  I also updated wife of f/u appt.       PLAN  Patient given options and choices for discharge yes .  Patient/family is agreeable to the plan?  Yes:   Patient anticipates discharging to home .        Patient anticipates needs for home equipment: No  Plan/Disposition: Home   Appointments:    Dr. Med CoelloCampoy-Friday January 4th at 11:15 am.     Internal medicine - endocrinology, diabetes & metabolism   [address], [place] [address] [phone]    Care  (CTS) will continue to follow as needed.    Alesha HARMON CTS [phone]

## 2019-01-03 LAB
ANION GAP SERPL CALCULATED.3IONS-SCNC: 9 MMOL/L (ref 3–14)
BACTERIA SPEC CULT: ABNORMAL
BUN SERPL-MCNC: 12 MG/DL (ref 7–30)
CALCIUM SERPL-MCNC: 7.5 MG/DL (ref 8.5–10.1)
CHLORIDE SERPL-SCNC: 113 MMOL/L (ref 94–109)
CO2 SERPL-SCNC: 22 MMOL/L (ref 20–32)
CREAT SERPL-MCNC: 0.66 MG/DL (ref 0.66–1.25)
GFR SERPL CREATININE-BSD FRML MDRD: >90 ML/MIN/{1.73_M2}
GLUCOSE BLDC GLUCOMTR-MCNC: 109 MG/DL (ref 70–99)
GLUCOSE BLDC GLUCOMTR-MCNC: 110 MG/DL (ref 70–99)
GLUCOSE BLDC GLUCOMTR-MCNC: 115 MG/DL (ref 70–99)
GLUCOSE BLDC GLUCOMTR-MCNC: 115 MG/DL (ref 70–99)
GLUCOSE BLDC GLUCOMTR-MCNC: 119 MG/DL (ref 70–99)
GLUCOSE BLDC GLUCOMTR-MCNC: 120 MG/DL (ref 70–99)
GLUCOSE BLDC GLUCOMTR-MCNC: 122 MG/DL (ref 70–99)
GLUCOSE BLDC GLUCOMTR-MCNC: 130 MG/DL (ref 70–99)
GLUCOSE BLDC GLUCOMTR-MCNC: 134 MG/DL (ref 70–99)
GLUCOSE BLDC GLUCOMTR-MCNC: 136 MG/DL (ref 70–99)
GLUCOSE BLDC GLUCOMTR-MCNC: 137 MG/DL (ref 70–99)
GLUCOSE BLDC GLUCOMTR-MCNC: 143 MG/DL (ref 70–99)
GLUCOSE BLDC GLUCOMTR-MCNC: 143 MG/DL (ref 70–99)
GLUCOSE BLDC GLUCOMTR-MCNC: 152 MG/DL (ref 70–99)
GLUCOSE BLDC GLUCOMTR-MCNC: 169 MG/DL (ref 70–99)
GLUCOSE BLDC GLUCOMTR-MCNC: 171 MG/DL (ref 70–99)
GLUCOSE BLDC GLUCOMTR-MCNC: 179 MG/DL (ref 70–99)
GLUCOSE BLDC GLUCOMTR-MCNC: 261 MG/DL (ref 70–99)
GLUCOSE SERPL-MCNC: 143 MG/DL (ref 70–99)
Lab: ABNORMAL
POTASSIUM SERPL-SCNC: 3.1 MMOL/L (ref 3.4–5.3)
POTASSIUM SERPL-SCNC: 3.4 MMOL/L (ref 3.4–5.3)
SODIUM SERPL-SCNC: 144 MMOL/L (ref 133–144)
SPECIMEN SOURCE: ABNORMAL

## 2019-01-03 PROCEDURE — 84132 ASSAY OF SERUM POTASSIUM: CPT | Performed by: INTERNAL MEDICINE

## 2019-01-03 PROCEDURE — 99232 SBSQ HOSP IP/OBS MODERATE 35: CPT | Performed by: INTERNAL MEDICINE

## 2019-01-03 PROCEDURE — 25000128 H RX IP 250 OP 636: Performed by: INTERNAL MEDICINE

## 2019-01-03 PROCEDURE — 25000131 ZZH RX MED GY IP 250 OP 636 PS 637: Performed by: INTERNAL MEDICINE

## 2019-01-03 PROCEDURE — 25000132 ZZH RX MED GY IP 250 OP 250 PS 637: Performed by: SURGERY

## 2019-01-03 PROCEDURE — 12000000 ZZH R&B MED SURG/OB

## 2019-01-03 PROCEDURE — 36415 COLL VENOUS BLD VENIPUNCTURE: CPT | Performed by: INTERNAL MEDICINE

## 2019-01-03 PROCEDURE — 25000132 ZZH RX MED GY IP 250 OP 250 PS 637: Performed by: INTERNAL MEDICINE

## 2019-01-03 PROCEDURE — 00000146 ZZHCL STATISTIC GLUCOSE BY METER IP

## 2019-01-03 PROCEDURE — 40000275 ZZH STATISTIC RCP TIME EA 10 MIN

## 2019-01-03 PROCEDURE — 99207 ZZC CDG-MDM COMPONENT: MEETS LOW - DOWN CODED: CPT | Performed by: INTERNAL MEDICINE

## 2019-01-03 PROCEDURE — 80048 BASIC METABOLIC PNL TOTAL CA: CPT | Performed by: INTERNAL MEDICINE

## 2019-01-03 RX ADMIN — SODIUM CHLORIDE 14 UNITS/HR: 9 INJECTION, SOLUTION INTRAVENOUS at 11:34

## 2019-01-03 RX ADMIN — AZITHROMYCIN MONOHYDRATE 250 MG: 250 TABLET ORAL at 08:24

## 2019-01-03 RX ADMIN — Medication 1 MG: at 00:26

## 2019-01-03 RX ADMIN — INSULIN ASPART 4 UNITS: 100 INJECTION, SOLUTION INTRAVENOUS; SUBCUTANEOUS at 12:20

## 2019-01-03 RX ADMIN — POTASSIUM CHLORIDE 40 MEQ: 1500 TABLET, EXTENDED RELEASE ORAL at 08:24

## 2019-01-03 RX ADMIN — GUAIFENESIN AND CODEINE PHOSPHATE 10 ML: 10; 100 LIQUID ORAL at 11:33

## 2019-01-03 RX ADMIN — DEXTROSE MONOHYDRATE, SODIUM CHLORIDE, AND POTASSIUM CHLORIDE: 50; 1.49; 4.5 INJECTION, SOLUTION INTRAVENOUS at 10:06

## 2019-01-03 RX ADMIN — ASPIRIN 81 MG: 81 TABLET, COATED ORAL at 08:24

## 2019-01-03 RX ADMIN — LEVOTHYROXINE SODIUM 137 MCG: 137 TABLET ORAL at 08:23

## 2019-01-03 RX ADMIN — POTASSIUM CHLORIDE 20 MEQ: 1500 TABLET, EXTENDED RELEASE ORAL at 10:26

## 2019-01-03 RX ADMIN — CEFTRIAXONE SODIUM 2 G: 2 INJECTION, POWDER, FOR SOLUTION INTRAMUSCULAR; INTRAVENOUS at 10:26

## 2019-01-03 RX ADMIN — Medication 1 MG: at 22:28

## 2019-01-03 RX ADMIN — DEXTROSE MONOHYDRATE, SODIUM CHLORIDE, AND POTASSIUM CHLORIDE: 50; 1.49; 4.5 INJECTION, SOLUTION INTRAVENOUS at 00:22

## 2019-01-03 RX ADMIN — INSULIN GLARGINE 60 UNITS: 100 INJECTION, SOLUTION SUBCUTANEOUS at 09:35

## 2019-01-03 RX ADMIN — GUAIFENESIN AND CODEINE PHOSPHATE 10 ML: 10; 100 LIQUID ORAL at 00:27

## 2019-01-03 RX ADMIN — SODIUM CHLORIDE 4 UNITS/HR: 9 INJECTION, SOLUTION INTRAVENOUS at 04:55

## 2019-01-03 ASSESSMENT — ACTIVITIES OF DAILY LIVING (ADL)
ADLS_ACUITY_SCORE: 12
ADLS_ACUITY_SCORE: 11
ADLS_ACUITY_SCORE: 12
ADLS_ACUITY_SCORE: 11

## 2019-01-03 NOTE — PROGRESS NOTES
[facility]    Medicine Progress Note - Hospitalist Service       Date of Admission:  12/31/2018  Assessment & Plan      1.  Acute severe DKA/type 1 diabetes: Likely due to poor adherence to insulin and underlying pneumonia.  Per wife's report, patient finally admitted on the morning of admission that he had run out of test strips and was just guessing his NovoLog dosing just so as long as he did not feel hypoglycemic and did not do a correction scale.  Clinically improving now.  switch to subcutaneous insulin and pre-prandial insulin along with sliding scale insulin.  2. Tachypnea with Kussmaul breathing on presentation: Improved after correction of acidosis.  3. Community-acquired pneumonia: Recent diagnosis and was on doxycycline 4 days prior to admission.  Now on ceftriaxone and azithromycin while inpatient.  4. Hypothyroidism: Continue Synthroid replacement  5. Detectable troponin: Probably present on ED workup.  Suspect maybe some mild degree of myocardial strain but clinical suspicion low for an ACS.  Follow-up troponin was nondetectable.  No further inpatient workup at this time.                Diet: Room Service  Moderate Consistent CHO Diet    DVT Prophylaxis: Pneumatic Compression Devices  Heard Catheter: not present  Code Status: Full Code      Disposition Plan   Expected discharge: Tomorrow, recommended to prior living arrangement once DKA resolved.  Entered: Herson Price MD 01/03/2019, 10:42 AM       The patient's care was discussed with the Patient.    Herson Price MD  Hospitalist Service  [facility]    ______________________________________________________________________    Interval History     No fevers/chills/chest pain/SOB.    Data reviewed today: I reviewed all medications, new labs and imaging results over the last 24 hours. I personally reviewed no images or EKG's today.    Physical Exam   Vital Signs: Temp: 97.8  F (36.6  C) Temp src: Oral BP: 133/75 Pulse:  71 Heart Rate: 80 Resp: 20 SpO2: 94 % O2 Device: None (Room air) Oxygen Delivery: 1 LPM  Weight: 290 lbs 0 oz    Gen - AAO x 3 in NAD.  Lungs - CTA B.  Heart - RR,S1+S2 nml, no m/g/r.  Abd - soft, NT, ND, + BS.  Ext - no edema.          Data   Recent Labs   Lab 01/03/19  0521 01/02/19 2018 01/02/19  1231 01/02/19  0525  01/01/19  0612  12/31/18  1511 12/31/18  1245 12/31/18  0947   WBC  --   --   --  9.6  --  11.6*  --   --   --  17.3*   HGB  --   --   --  13.6  --  13.8  --   --   --  17.7   MCV  --   --   --  85  --  83  --   --   --  90   PLT  --   --   --  264  --  301  --   --   --  538*   INR  --   --   --   --   --   --   --   --   --  1.17*    143 144 148*   < > 143   < > 136 136 132*   POTASSIUM 3.1* 3.6 3.6 3.4   < > 3.7   < > 4.5 4.9 4.9   CHLORIDE 113* 112* 113* 116*   < > 111*   < > 104 103 101   CO2 22 21 16* 19*   < > 24   < > 3* 4* 7*   BUN 12 13 14 14   < > 17   < > 23 22 21   CR 0.66 0.74 0.75 0.78   < > 0.79   < > 1.24 1.20 1.19   ANIONGAP 9 10 15* 13   < > 8   < > 29* 29* 24*   BAUTISTA 7.5* 7.6* 7.6* 7.8*   < > 7.3*   < > 7.9* 8.0* 8.4*   * 221* 320* 164*   < > 153*   < > 399* 444* 415*   ALBUMIN  --   --   --   --   --   --   --   --   --  3.8   PROTTOTAL  --   --   --   --   --   --   --   --   --  8.4   BILITOTAL  --   --   --   --   --   --   --   --   --  0.4   ALKPHOS  --   --   --   --   --   --   --   --   --  124   ALT  --   --   --   --   --   --   --   --   --  27   AST  --   --   --   --   --   --   --   --   --  15   TROPI  --   --   --   --   --   --   --  <0.015 0.023  --     < > = values in this interval not displayed.     No results found for this or any previous visit (from the past 24 hour(s)).  Medications     0.45% sodium chloride + KCl 20 mEq/L 100 mL/hr at 01/02/19 1636     dextrose 5% and 0.45% NaCl + KCl 20 mEq/L 75 mL/hr at 01/03/19 1006     insulin (regular) 1.5 Units/hr (01/03/19 1004)       aspirin  81 mg Oral Daily     azithromycin  250 mg Oral Daily      cefTRIAXone  2 g Intravenous Q24H     insulin aspart  1-10 Units Subcutaneous TID AC     insulin aspart  1-7 Units Subcutaneous At Bedtime     insulin aspart   Subcutaneous TID w/meals     insulin glargine  60 Units Subcutaneous QAM AC     levothyroxine  137 mcg Oral QAM AC     sodium chloride (PF)  3 mL Intracatheter Q8H

## 2019-01-03 NOTE — PROGRESS NOTES
Pt isn't ready to discharge yet.  I scheduled pt for f/u with his endocrinologist on Friday.  I called the  and rescheduled next available.  He is booked out till the end of the month.      Dr. Med Kenney-Wednesday January 23rd at 9:15.     Internal medicine - endocrinology, diabetes & metabolism   [facility], [place] [address] [phone]    Discharge AVS updated.  I updated Wife.      Alesha HARMON CTS [phone]

## 2019-01-03 NOTE — PLAN OF CARE
ICU End of Shift Summary.  For vital signs and complete assessments, please see documentation flowsheets.     Pertinent assessments: Patient alert and oriented. Up independently in room. Tolerating diet  Major Shift Events: insulin drip dc'd  Plan (Upcoming Events): transfer to medical bed when available  Discharge/Transfer Needs: home    Bedside Shift Report Completed : yes  Bedside Safety Check Completed:yes

## 2019-01-03 NOTE — PLAN OF CARE
ICU End of Shift Summary.  For vital signs and complete assessments, please see documentation flowsheets.     Pertinent assessments: See flowsheets/MAR/VSS  Major Shift Events: AAOx4, denies pain; up to BR with assist of one; unit(s) to chair approx 1 hr; Refused to use home CPAP; did require 1L O2 while sleeping  Plan (Upcoming Events): transition from insulin gtt to SQ  Discharge/Transfer Needs: TBD    Bedside Shift Report Completed : yes  Bedside Safety Check Completed: yes

## 2019-01-04 VITALS
OXYGEN SATURATION: 96 % | BODY MASS INDEX: 41.54 KG/M2 | RESPIRATION RATE: 20 BRPM | HEIGHT: 71 IN | HEART RATE: 96 BPM | WEIGHT: 296.7 LBS | SYSTOLIC BLOOD PRESSURE: 162 MMHG | TEMPERATURE: 96.5 F | DIASTOLIC BLOOD PRESSURE: 83 MMHG

## 2019-01-04 LAB
ANION GAP SERPL CALCULATED.3IONS-SCNC: 9 MMOL/L (ref 3–14)
BUN SERPL-MCNC: 12 MG/DL (ref 7–30)
CALCIUM SERPL-MCNC: 7.5 MG/DL (ref 8.5–10.1)
CHLORIDE SERPL-SCNC: 107 MMOL/L (ref 94–109)
CO2 SERPL-SCNC: 23 MMOL/L (ref 20–32)
CREAT SERPL-MCNC: 0.67 MG/DL (ref 0.66–1.25)
GFR SERPL CREATININE-BSD FRML MDRD: >90 ML/MIN/{1.73_M2}
GLUCOSE BLDC GLUCOMTR-MCNC: 138 MG/DL (ref 70–99)
GLUCOSE BLDC GLUCOMTR-MCNC: 173 MG/DL (ref 70–99)
GLUCOSE BLDC GLUCOMTR-MCNC: 191 MG/DL (ref 70–99)
GLUCOSE SERPL-MCNC: 156 MG/DL (ref 70–99)
INTERPRETATION ECG - MUSE: NORMAL
PHOSPHATE SERPL-MCNC: 2.3 MG/DL (ref 2.5–4.5)
POTASSIUM SERPL-SCNC: 3.2 MMOL/L (ref 3.4–5.3)
SODIUM SERPL-SCNC: 139 MMOL/L (ref 133–144)

## 2019-01-04 PROCEDURE — 90686 IIV4 VACC NO PRSV 0.5 ML IM: CPT | Performed by: INTERNAL MEDICINE

## 2019-01-04 PROCEDURE — 36415 COLL VENOUS BLD VENIPUNCTURE: CPT | Performed by: INTERNAL MEDICINE

## 2019-01-04 PROCEDURE — 25000132 ZZH RX MED GY IP 250 OP 250 PS 637: Performed by: INTERNAL MEDICINE

## 2019-01-04 PROCEDURE — 84100 ASSAY OF PHOSPHORUS: CPT | Performed by: INTERNAL MEDICINE

## 2019-01-04 PROCEDURE — 80048 BASIC METABOLIC PNL TOTAL CA: CPT | Performed by: INTERNAL MEDICINE

## 2019-01-04 PROCEDURE — 25000128 H RX IP 250 OP 636: Performed by: INTERNAL MEDICINE

## 2019-01-04 PROCEDURE — 99239 HOSP IP/OBS DSCHRG MGMT >30: CPT | Performed by: INTERNAL MEDICINE

## 2019-01-04 PROCEDURE — 00000146 ZZHCL STATISTIC GLUCOSE BY METER IP

## 2019-01-04 PROCEDURE — 25000131 ZZH RX MED GY IP 250 OP 636 PS 637: Performed by: INTERNAL MEDICINE

## 2019-01-04 RX ORDER — CEFDINIR 300 MG/1
300 CAPSULE ORAL 2 TIMES DAILY
Qty: 8 CAPSULE | Refills: 0 | Status: SHIPPED | OUTPATIENT
Start: 2019-01-04 | End: 2019-02-01

## 2019-01-04 RX ADMIN — ASPIRIN 81 MG: 81 TABLET, COATED ORAL at 08:12

## 2019-01-04 RX ADMIN — INSULIN GLARGINE 60 UNITS: 100 INJECTION, SOLUTION SUBCUTANEOUS at 08:12

## 2019-01-04 RX ADMIN — LEVOTHYROXINE SODIUM 137 MCG: 137 TABLET ORAL at 06:37

## 2019-01-04 RX ADMIN — POTASSIUM CHLORIDE 40 MEQ: 1500 TABLET, EXTENDED RELEASE ORAL at 08:31

## 2019-01-04 RX ADMIN — POTASSIUM CHLORIDE 20 MEQ: 1500 TABLET, EXTENDED RELEASE ORAL at 10:34

## 2019-01-04 RX ADMIN — AZITHROMYCIN MONOHYDRATE 250 MG: 250 TABLET ORAL at 08:12

## 2019-01-04 RX ADMIN — INFLUENZA A VIRUS A/MICHIGAN/45/2015 X-275 (H1N1) ANTIGEN (FORMALDEHYDE INACTIVATED), INFLUENZA A VIRUS A/SINGAPORE/INFIMH-16-0019/2016 IVR-186 (H3N2) ANTIGEN (FORMALDEHYDE INACTIVATED), INFLUENZA B VIRUS B/PHUKET/3073/2013 ANTIGEN (FORMALDEHYDE INACTIVATED), AND INFLUENZA B VIRUS B/MARYLAND/15/2016 BX-69A ANTIGEN (FORMALDEHYDE INACTIVATED) 0.5 ML: 15; 15; 15; 15 INJECTION, SUSPENSION INTRAMUSCULAR at 11:25

## 2019-01-04 ASSESSMENT — ACTIVITIES OF DAILY LIVING (ADL)
ADLS_ACUITY_SCORE: 11

## 2019-01-04 ASSESSMENT — MIFFLIN-ST. JEOR: SCORE: 2272.95

## 2019-01-04 NOTE — PLAN OF CARE
Pt vitals stable, 0200 Blood glucose 138. Occasionally tachy. Pt used home cpap overnight. Pt on zithromax and rocephin for pna. Mod carb diet, ambulating independently, hoping to discharge home today.

## 2019-01-04 NOTE — PLAN OF CARE
HR tachy-107, other VSS.  Up Ind. Tolerating a mod cho diet.  & 179.   PIV SL in left hand.   Generalized +1 edema to all extremities.  LS clear. BS hypo, +gas. Last bm today. No c/o pain or nausea. K+ replaced in ICU for 3.1, recheck 3.4.   Has home c-pap here for CHAZ  Voiding adequately.   Plan to discharge to home tomorrow, wife will provide transport.   Endocrinologist appt in AVS.   Will cont to monitor per POC.

## 2019-01-04 NOTE — PLAN OF CARE
Patient hospitalized for DKA. Cleared for discharge to home today per MD. Discharge instructions, medications, and follow-ups reviewed with patient and spouse in detail. Medications being filled at patients choice of pharmacy. Diabetic education provided along with handout. Patient and spouse verbalized understanding of discharge instructions. Belongings were returned to patient at time of discharge. Spouse providing transport home.

## 2019-01-04 NOTE — PROGRESS NOTES
Wife requests resources for primary care clinics with integrated care.  Wife given handout for [facility] integrated care and [facility] [place].  Alesha HARMON CTS [phone]

## 2019-01-04 NOTE — DISCHARGE SUMMARY
[facility]  Discharge Summary  Name: Karthik Lopez    MRN: [id number]  YOB: 1975    Age: 43 year old  Date of Discharge:  1/4/2019  1:25 PM  Date of Admission: 12/31/2018  Primary Care Provider: MENDY Kenney  Discharge Physician:  Hayden Hoang MD  Discharging Service:  Hospitalist      Discharge Diagnoses:  Severe DKA  Lactic acidosis  Type 1 diabetes mellitus  Community acquired pneumonia  Hypothyroidism  Elevated troponin  Obesity     Hospital Course:  Karthik Lopez is a 43-year-old male with history of type 1 diabetes mellitus and hypothyroidism who was admitted to the ICU on 12/31/2018 after presenting with weakness, nausea, and shortness of breath after recent diagnosis of pneumonia on outpatient doxycycline.  He was found to be in severe DKA with a bicarb level of 4.  Initial blood sugar above 400.  Per report he ran out of testing strips a year ago and was estimating blood sugar needs since then.  His A1c is 9.4.  He had Kussmaul breathing consistent with DKA.  He was started on an insulin drip and the DKA protocol and admitted to the ICU.  He was also placed on a bicarb drip for severe acidosis with a pH of less than 7.  His lactic acid was elevated at 5.6 likely secondary to hypovolemia and dehydration.  Leukocytosis to 17.  Given his recent pneumonia he was put on ceftriaxone and azithromycin.  Chest x-ray here did not show any infiltrate.  He had a slightly elevated troponin of 0.023 without any chest pain.  Not consistent with ACS.  He was managed on an insulin drip for 4 days and subcutaneous insulin was restarted.  He was back on his home regimen of 60 units Lantus in the morning along with half unit per carb aspart with meals.  His testing strips were refilled on discharge and he will complete additional 4 days of cefdinir. Outpatient follow-up with his endocrinologist has been arranged.     Discharge Disposition:  Discharged to home  "    Allergies:  No Known Allergies     Discharge Medications:   Current Discharge Medication List      START taking these medications    Details   blood glucose (ACCU-CHEK BK PLUS) test strip Use to test blood sugar 4 times daily or as directed.  Qty: 100 each, Refills: 3    Associated Diagnoses: Type 1 diabetes mellitus without complication (H)      cefdinir (OMNICEF) 300 MG capsule Take 1 capsule (300 mg) by mouth 2 times daily for 4 days  Qty: 8 capsule, Refills: 0    Associated Diagnoses: Pneumonia due to infectious organism, unspecified laterality, unspecified part of lung         CONTINUE these medications which have NOT CHANGED    Details   aspirin 81 MG tablet Take 81 mg by mouth daily      canaliflozin (INVOKANA) tablet Take 300 mg by mouth every morning (before breakfast)      guaiFENesin-codeine (ROBITUSSIN AC) 100-10 MG/5ML solution Take 5 mLs by mouth every 6 hours as needed for cough      insulin glargine (LANTUS) 100 UNIT/ML injection Inject 60 Units Subcutaneous every morning      insulin lispro (HUMALOG KWIKPEN) 100 UNIT/ML injection Inject Subcutaneous 3 times daily (with meals) 0.5 unit/carb unit      levothyroxine (SYNTHROID/LEVOTHROID) 137 MCG tablet Take 137 mcg by mouth every morning (before breakfast)      Multiple Vitamins-Minerals (MULTIVITAMIN MEN) TABS Take 1 tablet by mouth daily      Omega-3 Fatty Acids (FISH OIL PO) Take 1 capsule by mouth daily         STOP taking these medications       doxycycline hyclate (VIBRA-TABS) 100 MG tablet Comments:   Reason for Stopping:                Condition on Discharge:  Discharge condition: Stable   Discharge vitals: Blood pressure 162/83, pulse 96, temperature 96.5  F (35.8  C), temperature source Oral, resp. rate 20, height 1.803 m (5' 11\"), weight 134.6 kg (296 lb 11.2 oz), SpO2 96 %.   Code status on discharge: Full Code     History of Illness:  See detailed admission note for full details.    Physical Exam:  Blood pressure 162/83, pulse 96, " "temperature 96.5  F (35.8  C), temperature source Oral, resp. rate 20, height 1.803 m (5' 11\"), weight 134.6 kg (296 lb 11.2 oz), SpO2 96 %.  Wt Readings from Last 1 Encounters:   01/04/19 134.6 kg (296 lb 11.2 oz)     Constitutional: Awake, NAD   Eyes: sclera white   HEENT:  MMM  Respiratory: no respiratory distress, lungs cta bilaterally, no crackles or wheeze  Cardiovascular: RRR.  No murmur   GI: Obese, non-tender, not distended, bowel sounds present  Skin: no rash or lesions, acyanotic  Musculoskeletal/extremities: atraumatic, no major deformities. No edema  Neurologic: A&O, speech clear   Psychiatric: calm, cooperative, normal affect    Procedures other than Imaging:  None     Imaging:  Results for orders placed or performed during the hospital encounter of 12/31/18   XR Chest 2 Views    Narrative    XR CHEST 2 VW 12/31/2018 10:02 AM    HISTORY: Fever. Cough.    COMPARISON: None.      Impression    IMPRESSION: 2 views of the chest show no acute or active  cardiopulmonary disease.     GERRY GANDHI MD        Consultations:  No consultations were requested during this admission.       Recent Lab Results:  Recent Labs   Lab 01/02/19  0525 01/01/19  0612 12/31/18  0947   WBC 9.6 11.6* 17.3*   HGB 13.6 13.8 17.7   HCT 39.2* 39.4* 54.8*   MCV 85 83 90    301 538*     Recent Labs   Lab 01/04/19  0653 01/03/19  1423 01/03/19  0521 01/02/19 2018 01/02/19  1231  12/31/18  1753  12/31/18  0947     --  144 143 144   < > 140   < > 132*   POTASSIUM 3.2* 3.4 3.1* 3.6 3.6   < > 4.2   < > 4.9   CHLORIDE 107  --  113* 112* 113*   < > 109   < > 101   CO2 23  --  22 21 16*   < > 5*   < > 7*   ANIONGAP 9  --  9 10 15*   < > 26*   < > 24*   *  --  143* 221* 320*   < > 271*   < > 415*   BUN 12  --  12 13 14   < > 23   < > 21   CR 0.67  --  0.66 0.74 0.75   < > 1.16   < > 1.19   GFRESTIMATED >90  --  >90 >90 >90   < > 78   < > 75   GFRESTBLACK >90  --  >90 >90 >90   < > 90   < > 87   BAUTISTA 7.5*  --  7.5* 7.6* 7.6*   " < > 8.1*   < > 8.4*   MAG  --   --   --   --   --   --  2.2  --  2.2   PHOS 2.3*  --   --   --  1.9*  --   --   --  4.9*   PROTTOTAL  --   --   --   --   --   --   --   --  8.4   ALBUMIN  --   --   --   --   --   --   --   --  3.8   BILITOTAL  --   --   --   --   --   --   --   --  0.4   ALKPHOS  --   --   --   --   --   --   --   --  124   AST  --   --   --   --   --   --   --   --  15   ALT  --   --   --   --   --   --   --   --  27    < > = values in this interval not displayed.     Recent Labs   Lab 12/31/18  1356 12/31/18  1225 12/31/18  1036   LACT 5.2* 5.6* 4.4*          Pending Results:    Unresulted Labs Ordered in the Past 30 Days of this Admission     Date and Time Order Name Status Description    12/31/2018 0932 Blood culture Preliminary     12/31/2018 0932 Blood culture Preliminary          These results will be followed up by patient's primary care provider.    Discharge Instructions and Follow-Up:   Discharge Procedure Orders   Reason for your hospital stay   Order Comments: You were hospitalized for DKA and pneumonia.  This improved with an insulin drip and antibiotics.  You are back on your home insulin regimen and testing strips have been sent to your pharmacy.  An antibiotic for the pneumonia has also been prescribed.     Follow-up and recommended labs and tests    Order Comments: Follow up arranged with your endocrinologist has been arranged for 1/23/19 at 9:15 am     Activity   Order Comments: Your activity upon discharge: activity as tolerated     Order Specific Question Answer Comments   Is discharge order? Yes      Full Code     Order Specific Question Answer Comments   Code status determined by: Discussion with patient/legal decision maker      Diet   Order Comments: Follow this diet upon discharge: Orders Placed This Encounter      Room Service      Moderate Consistent CHO Diet     Order Specific Question Answer Comments   Is discharge order? Yes          I, Hayden Hoang, personally saw  the patient today and spent greater than 30 minutes discharging this patient.    Hayden Hoang MD

## 2019-01-06 LAB
BACTERIA SPEC CULT: NO GROWTH
BACTERIA SPEC CULT: NO GROWTH
Lab: NORMAL
Lab: NORMAL
SPECIMEN SOURCE: NORMAL
SPECIMEN SOURCE: NORMAL

## 2019-01-07 NOTE — PROGRESS NOTES
Transition Communication Hand-off for Care Transitions to Next Level of Care Provider    Name: Karthik Lopez  : 1975  MRN #: [id number]  Primary Care Provider: MENDY Kenney MD  Primary Care MD Name: Dr Bruno  Primary Clinic: [facility] [address] [place] [address]  Primary Care Clinic Name: [facility]   Reason for Hospitalization:  Respiratory alkalosis [E87.3]  Diabetic ketoacidosis without coma associated with type 1 diabetes mellitus (H) [E10.10]  DKA, type 1, not at goal (H) [E10.10]  Admit Date/Time: 2018  9:16 AM  Discharge Date: 19  Payor Source: Payor: [employer] / Plan: [employer] OPEN ACCESS / Product Type: HMO /     Readmission Assessment Measure (TANYA) Risk Score/category: Low           Reason for Communication Hand-off Referral: Avoidable readmission within 30 days    Discharge Plan: Home       Concern for non-adherence with plan of care:   Yes  Discharge Needs Assessment:  Needs      Most Recent Value   # of Referrals Placed by CTS  External Care Coordination, Scheduled Follow-up appointments          Already enrolled in Tele-monitoring program and name of program:  NA  Follow-up specialty is recommended: Yes    Follow-up plan:    Future Appointments   Date Time Provider Department Center   2019  2:30 PM Hattie Hillman Mai, MD [facility]       Any outstanding tests or procedures:         Key Recommendations:   Pt is admitted with DKA with a history of type 1 Diabetes.  He ran out of testing strips for his Accu lucius Plus meter and was guessing his dosing of insulin.  These were filled for patient upon discharge.  Otherwise, he feels he can manage his diabetes.  He carb counts and has insulin.  However, he hasn't seen his endocrinologist in quite some time  And was agreeable to scheduling.  Patient was also recently treated with pneumonia and was discharged with 4 days of oral antibiotic.     Danyelle White & Pauly  Orde    AVS/Discharge Summary is the source of truth; this is a helpful guide for improved communication of patient story

## 2019-01-08 NOTE — PROGRESS NOTES
"  SUBJECTIVE:   Karthik Lopez is a 43 year old male who presents to clinic today for the following health issues:    MNNathan sees Endo with [facility]    New Patient/Transfer of Care     Sign up for Alish[facility]?  Collect Authorization/[facility]    Previous PCP or place of care: None  Up to date on yearly wellness exam? No  Reason for transfer of care (if known): wants to get back in control of health problems.     Health Maintenance:     Lipid  Done? No    HIV done? Not sure    Hospital Follow-up Visit:    Hospital/Nursing Home/IP Rehab Facility: [facility]  Date of Admission: 12/31/2018  Date of Discharge: 1/4/2018  Reason(s) for Admission: Severe DKA            Problems taking medications regularly:  None       Medication changes since discharge: Start: Accucheck test strips and cefdinir  Stop taking: Doxycycline       Problems adhering to non-medication therapy:  None    Summary of hospitalization:  [employer] discharge summary reviewed  Diagnostic Tests/Treatments reviewed.  Follow up needed: none  Other Healthcare Providers Involved in Patient s Care:         None  Update since discharge: improved.     Post Discharge Medication Reconciliation: discharge medications reconciled, continue medications without change.  Plan of care communicated with patient     Coding guidelines for this visit:  Type of Medical   Decision Making Face-to-Face Visit       within 7 Days of discharge Face-to-Face Visit        within 14 days of discharge   Moderate Complexity [id number]   High Complexity 27340 86865              Diabetes     Diagnosed with type 1 DM in 20s.  Course has been \"frustrating\" as pt was initially very well controlled however more recently has been lost to follow-up due to feeling defeated with his diagnosis.  Is here because he wants to start taking his illness more seriously, as he knows he is increased risk of heart disease.  Wants to gain more control over disease.  Has never had " insulin pump or continuous glucose monitor.       Symptoms of hypoglycemia (low blood sugar): none    Has endocrinologist    On insulin and invokana    Reports intolerable side effects with metformin and GLP1 agonist.    BP Readings from Last 2 Encounters:   01/04/19 162/83   01/29/18 106/68     Hemoglobin A1C (%)   Date Value   01/02/2019 9.4 (H)   12/31/2018 9.3 (H)       Diabetes Management Resources    Problem list and histories reviewed & adjusted, as indicated.  Additional history: as documented    Patient Active Problem List   Diagnosis     Dehydration     DKA (diabetic ketoacidosis) (H)     Obesity (BMI 35.0-39.9) with comorbidity (H)     Past Surgical History:   Procedure Laterality Date     ORCHIECTOMY INGUINAL CHILD       TESTICLE SURGERY       TONSILLECTOMY         Social History     Tobacco Use     Smoking status: Never Smoker     Smokeless tobacco: Never Used   Substance Use Topics     Alcohol use: Yes     Comment: once a week     Family History   Problem Relation Age of Onset     Thyroid Disease Mother      Hypertension Father      Diabetes Father      Lymphoma Father      Myocardial Infarction Paternal Grandfather          Current Outpatient Medications   Medication Sig Dispense Refill     aspirin 81 MG tablet Take 81 mg by mouth daily       blood glucose (ACCU-CHEK BK PLUS) test strip Use to test blood sugar 4 times daily or as directed. 100 each 3     canaliflozin (INVOKANA) tablet Take 300 mg by mouth every morning (before breakfast)       guaiFENesin-codeine (ROBITUSSIN AC) 100-10 MG/5ML solution Take 5 mLs by mouth every 6 hours as needed for cough       insulin glargine (BASAGLAR KWIKPEN) 100 UNIT/ML pen Inject 60 Units Subcutaneous daily       insulin lispro (HUMALOG KWIKPEN) 100 UNIT/ML injection Inject Subcutaneous 3 times daily (with meals) 0.5 unit/carb unit       levothyroxine (SYNTHROID/LEVOTHROID) 137 MCG tablet Take 137 mcg by mouth every morning (before breakfast)       Multiple  "Vitamins-Minerals (MULTIVITAMIN MEN) TABS Take 1 tablet by mouth daily       Omega-3 Fatty Acids (FISH OIL PO) Take 1 capsule by mouth daily       No Known Allergies    Reviewed and updated as needed this visit by clinical staff       Reviewed and updated as needed this visit by Provider         ROS:  Constitutional, HEENT, cardiovascular, pulmonary, gi and gu systems are negative, except as otherwise noted.    OBJECTIVE:     /66   Pulse 112   Temp 97.7  F (36.5  C) (Tympanic)   Ht 1.803 m (5' 11\")   Wt 130.1 kg (286 lb 12.8 oz)   SpO2 97%   BMI 40.00 kg/m    Body mass index is 40 kg/m .  GENERAL: obese, alert and no distress  EYES: Eyes grossly normal to inspection  NECK: no asymmetry, masses, or scars  MS: no gross musculoskeletal defects noted, no edema  SKIN: no suspicious lesions or rashes  NEURO: mentation intact and speech normal  PSYCH: mentation appears normal and affect normal/bright      Diagnostic Test Results:  none     ASSESSMENT/PLAN:     1. Encounter to establish care  Reviewed history.  Follow-up for physical 1 month.    2. Hospital discharge follow-up  Reviewed hospitalization and answered questions and concerns.  Reviewed home medications and follow-up.    3. Diabetic ketoacidosis without coma associated with type 1 diabetes mellitus (H)  Will discuss continuous glucose monitor options.  - DIABETES EDUCATOR REFERRAL    4. Type 1 diabetes mellitus with hyperglycemia (H)  Not well controlled.  Has endocrinologist - has follow-up coming soon.  - CARE COORDINATION REFERRAL  - DIABETES EDUCATOR REFERRAL    5. BMI 40  Invokana may help with weight loss.  Weight loss will certainly assist in decreasing risk of comorbidities and improve overall health.  Will discuss further.      Patient Instructions   Welcome to our clinic!    Please follow-up with me in 1 month for a fasting physical exam.    Today, I referred you to \"DIABETES EDUCATOR\"   When you follow-up with your endocrinologist, ask " about a GLP-1 inhibitor for weight loss        Jonathan Hillman MD  [facility]

## 2019-01-11 ENCOUNTER — PATIENT OUTREACH (OUTPATIENT)
Dept: CARE COORDINATION | Facility: CLINIC | Age: 44
End: 2019-01-11

## 2019-01-11 ENCOUNTER — OFFICE VISIT (OUTPATIENT)
Dept: PEDIATRICS | Facility: CLINIC | Age: 44
End: 2019-01-11
Payer: COMMERCIAL

## 2019-01-11 VITALS
HEIGHT: 71 IN | TEMPERATURE: 97.7 F | OXYGEN SATURATION: 97 % | WEIGHT: 286.8 LBS | HEART RATE: 112 BPM | SYSTOLIC BLOOD PRESSURE: 122 MMHG | BODY MASS INDEX: 40.15 KG/M2 | DIASTOLIC BLOOD PRESSURE: 66 MMHG

## 2019-01-11 DIAGNOSIS — E66.01 MORBID OBESITY (H): ICD-10-CM

## 2019-01-11 DIAGNOSIS — E10.65 TYPE 1 DIABETES MELLITUS WITH HYPERGLYCEMIA (H): ICD-10-CM

## 2019-01-11 DIAGNOSIS — Z09 HOSPITAL DISCHARGE FOLLOW-UP: ICD-10-CM

## 2019-01-11 DIAGNOSIS — Z76.89 ENCOUNTER TO ESTABLISH CARE: Primary | ICD-10-CM

## 2019-01-11 DIAGNOSIS — E10.10 DIABETIC KETOACIDOSIS WITHOUT COMA ASSOCIATED WITH TYPE 1 DIABETES MELLITUS (H): ICD-10-CM

## 2019-01-11 PROCEDURE — 99214 OFFICE O/P EST MOD 30 MIN: CPT | Performed by: INTERNAL MEDICINE

## 2019-01-11 RX ORDER — INSULIN GLARGINE 100 [IU]/ML
60 INJECTION, SOLUTION SUBCUTANEOUS DAILY
COMMUNITY
End: 2021-08-26

## 2019-01-11 ASSESSMENT — MIFFLIN-ST. JEOR: SCORE: 2228.05

## 2019-01-11 ASSESSMENT — ACTIVITIES OF DAILY LIVING (ADL): DEPENDENT_IADLS:: INDEPENDENT

## 2019-01-11 NOTE — LETTER
[place] CARE COORDINATION  [facility] [address] [place] [address]  January 11, 2019    Karthik Lopez  [address] [place] [address]      Dear Karthik,    I am a clinic care coordinator who works with MENDY Kenney MD, MD. I wanted to thank you for spending the time to talk with me!  I wanted to  provide you with my contact information so that you can call me with questions or concerns about your health care. Below is a description of clinic care coordination and how I can further assist you.     The clinic care coordinator is a registered nurse and/or  who understand the health care system. The goal of clinic care coordination is to help you manage your health and improve access to the [facility] system in the most efficient manner. The registered nurse can assist you in meeting your health care goals by providing education, coordinating services, and strengthening the communication among your providers. The  can assist you with financial, behavioral, psychosocial, chemical dependency, counseling, and/or psychiatric resources.    Please feel free to contact me at [phone], with any questions or concerns. We at [facility] are focused on providing you with the highest-quality healthcare experience possible and that all starts with you.     It was so great to meet you and your wife! I look forward to continued discussions about how we can help you meet your healthcare goals!    Sincerely,     Gely Delgado RN   Clinic Care Coordinator-[facility]  PH: [phone]

## 2019-01-11 NOTE — LETTER
[facility] Home  Complex Care Plan  About Me  Patient Name:  Karthik Eastman    YOB: 1975  Age:     43 year old   [place] MRN:   [id number] Telephone Information:  Home Phone [phone]   Mobile [phone]       Address:    [address] [place] [address] [place] [address] Email address:  [email or web address]      Emergency Contact(s)  Name Relationship Lgl Grd Work Phone Home Phone Mobile Phone   Steven. JACQUELYN EASTMAN Spouse  none [phone]           Primary language:  English     needed? No   [place] [employer]:  [phone] op. 1  Other communication barriers:    Preferred Method of Communication:     Current living arrangement: I live in a private home with spouse  Mobility Status/ Medical Equipment: Independent    Health Maintenance  Health Maintenance Reviewed: (foot exam, lipid monitoring, TSH, immunization: coming back in 1 month for routine physical/preventative)    My Access Plan  Medical Emergency 911   Primary Clinic Line   -     24 Hour Appointment Line [phone] or  [phone]) (toll-free)   24 Hour Nurse Line [phone] (toll-free)   Preferred Urgent Care [facility] [place], [phone]   Preferred Hospital [facility]  [phone]   Preferred Pharmacy [facility] [address] [facility] [employer] - [place] - [address]     Behavioral Health Crisis Line The National Suicide Prevention Lifeline at 1-170.970.7744 or 911     My Care Team Members    Patient Care Team       Relationship Specialty Notifications Start End    MENDY Kenney MD PCP - General INTERNAL MEDICINE - ENDOCRINOLOGY, DIABETES & METABOLISM  1/2/19     Phone: [phone] Fax: [phone]         [facility] [address] [place] [address]    Gely Delgado, RN Lead Care Coordinator Primary Care - [facility]  1/11/19             My Care Plans  Self Management and Treatment Plan  Goals and (Comments)  Goals         General    Improve chronic symptoms (pt-stated)     Notes - Note created  1/11/2019  4:04 PM by Gely Delgado, RN    Goal Statement: I want to gain better control of my diabetes and stay engaged with my healthcare team to prevent long term health care issues related to underlying diabetes  Measure of Success: Evidenced by A1C of < 7.0 and patient's continued engagement with interdisciplinary care team; free of any recurrent hospitalizations in 2019 due to diabetes or diabetic complications  Supportive Steps to Achieve: I will attend a Diabetes Education session      I will begin to incorporate consistent moderate-intensity activity into my weekly routine      I will complete additional annual wellness screenings, as recommended by my PCP      I will monitor my blood sugars consistently every day  Barriers: Patient self reports he has historically been non-engaged and/or has not sustained prior engagement  Strengths: Motivated to implement lifestyle changes; has a good understanding of proper diabetes management and self care as well as able to articulate potential consequences of poor control  Date to Achieve By: 9 months September 11th, 2019                      My Medical and Care Information  Problem List   Patient Active Problem List   Diagnosis     Dehydration     DKA (diabetic ketoacidosis) (H)     Obesity (BMI 35.0-39.9) with comorbidity (H)          Care Coordination Start Date: 1/11/2019   Frequency of Care Coordination: 2 weeks   Form Last Updated: 01/11/2019

## 2019-01-11 NOTE — PATIENT INSTRUCTIONS
"Welcome to our clinic!    Please follow-up with me in 1 month for a fasting physical exam.    Today, I referred you to \"DIABETES EDUCATOR\"   When you follow-up with your endocrinologist, ask about a GLP-1 inhibitor for weight loss    "

## 2019-01-11 NOTE — PROGRESS NOTES
"Clinic Care Coordination Contact    Clinic Care Coordination Contact  OUTREACH    Referral Information:  Referral Source: PCP    Primary Diagnosis: Diabetes    Chief Complaint   Patient presents with     Clinic Care Coordination - Face To Face     establish care/diabetes management     Clinic Care Coordination - Initial        Universal Utilization:   Clinic Utilization  Difficulty keeping appointments: No  Compliance Concerns: Yes  No-Show Concerns: No  No PCP office visit in Past Year: No  Utilization    Last refreshed: 1/11/2019  2:11 PM:  Hospital Admissions 2           Last refreshed: 1/11/2019  2:11 PM:  ED Visits 0           Last refreshed: 1/11/2019  2:11 PM:  No Show Count (past year) 0              Current as of: 1/11/2019  2:11 PM          RN ADEOLA met with patient in co-visit during establish care post-hospital appointment; patient's spouse also present    Clinical Concerns:  Current Medical Concerns:  Patient was recently hospitalized at [facility] for severe diabetic ketoacidosis; he required Intensive Care Unit admission and hospitalization lasted from 12/31/18-01/04/19.  Here in clinic today to establish with Primary Care Provider.   Patient states he \"has had diabetes since sometime in my 20's\"; he offers a history of initially feeling frustrated after diagnosis of inability to lose weight (on insulin) despite lifestyle changes.  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       Education Provided to patient: RN CC provided introduction of self and role, provided description of Clinic Care Coordination    In collaboration with PCP, " "discussed opportunity to consider Continuous Glucose Monitoring System (CGM) as patient had reported he is not interested in an insulin pump but is curios about \"something that would help me make it easier to monitor my sugars throughout the day\"    Patient plans to further discuss options/recommendations with his endocrinologist on 19.    Health Maintenance Reviewed: (foot exam, lipid monitoring, TSH, immunization: coming back in 1 month for routine physical/preventative)  Clinical Pathway: Clinic Care Coordination Diabetes Assessment    Diabetes type: Type 1  What is the duration of your diabetes?    What is the state of your diabetes? Getting harder to manage  Have you received Diabetes education in the past 24 months? No    Symptoms:  Do you have:  Symptoms  Blurred vision: No  Fatigue: No  Neuropathy: No  Foot ulcerations: No  Polydipsia: No  Polyphagia: No  Polyuria: No  Visual change: No  Weakness: Yes  Weight loss: No  Slow healing wounds: No  Recent Infection(s): Yes(pneumonia)  Symptom course: Improving  Weight trend: Stable    Hypoglycemia Symptoms: Patient states that he feels \"hypoglycemic symptoms even at around \"  Do you have:  Hypoglycemia symptoms  Confusion: Yes  Dizziness or Light-Headedness: Yes  Hunger: Yes  Nervousness/Anxiety: Yes  Tremors: Yes    Diabetic Complications:   Diabetic Complications  Autonomic neuropathy: No  CVA: No  Heart disease: No  Nephropathy: No  Peripheral neuropathy: No  Peripheral Vascular Disease: No    Monitoring Regimen:   Home Glucose (Sugar) Monitorin-2 times per day  Monitoring compliance: Fair    How is your weight trend? Stable  How often do you meal plan?    Do you visit a dietician?    Patient on a regular basis: Counts carbohydrates       Medication Management:  Patient is well versed on medication management; he is on an insulin and Invokana regimen; he reports he had side effects on Metformin and Byetta in the past    Functional " Status:  Dependent ADLs:: Independent  Dependent IADLs:: Independent  Bed or wheelchair confined:: No  Mobility Status: Independent  Fallen 2 or more times in the past year?: No  Any fall with injury in the past year?: No    Living Situation:  Current living arrangement:: I live in a private home with spouse    Diet/Exercise/Sleep:  Diet:: Diabetic diet  Inadequate nutrition (GOAL):: No  Food Insecurity: No  Tube Feeding: No                 Goals:   Goals        General    Improve chronic symptoms (pt-stated)     Notes - Note created  1/11/2019  4:04 PM by Gely Delgado RN    Goal Statement: I want to gain better control of my diabetes and stay engaged with my healthcare team to prevent long term health care issues related to underlying diabetes  Measure of Success: Evidenced by A1C of < 7.0 and patient's continued engagement with interdisciplinary care team; free of any recurrent hospitalizations in 2019 due to diabetes or diabetic complications  Supportive Steps to Achieve: I will attend a Diabetes Education session      I will begin to incorporate consistent moderate-intensity activity into my weekly routine      I will complete additional annual wellness screenings, as recommended by my PCP      I will monitor my blood sugars consistently every day  Barriers: Patient self reports he has historically been non-engaged and/or has not sustained prior engagement  Strengths: Motivated to implement lifestyle changes; has a good understanding of proper diabetes management and self care as well as able to articulate potential consequences of poor control  Date to Achieve By: 9 months September 11th, 2019                  Patient/Caregiver understanding: Patient verbalized understanding, engaged in AIDET communication behavior during encounter.    Outreach Frequency: 2 weeks  Future Appointments              In 3 weeks Hattie Hillman Mai, MD [facility]    In 1 month [facility] DIABETIC ED RESOURCE [facility]abetes [facility]          Plan:  RN CC will mail patient Introduction to Care Coordination Letter and include Complex Care Plan; provided business card and encouraged patient/spouse to call this writer sooner with questions or concerns: stressed our goal is to assure our patients have access to all of the ongoing recommended care they need.     RN CC will plan future follow up outreach for 2-3 weeks.     Gely Delgado RN   Clinic Care Coordinator-[facility]  PH: [phone]

## 2019-01-23 ENCOUNTER — TRANSFERRED RECORDS (OUTPATIENT)
Dept: HEALTH INFORMATION MANAGEMENT | Facility: CLINIC | Age: 44
End: 2019-01-23

## 2019-01-23 LAB
ALT SERPL-CCNC: 14 IU/L (ref 0–44)
AST SERPL-CCNC: 15 IU/L (ref 0–40)
CREAT SERPL-MCNC: 0.72 MG/DL (ref 0.76–1.27)
GFR SERPL CREATININE-BSD FRML MDRD: 116 ML/MIN/1.73M2
GLUCOSE SERPL-MCNC: 166 MG/DL (ref 65–99)
POTASSIUM SERPL-SCNC: 4.8 MMOL/L (ref 3.5–5.2)

## 2019-01-28 ENCOUNTER — PATIENT OUTREACH (OUTPATIENT)
Dept: CARE COORDINATION | Facility: CLINIC | Age: 44
End: 2019-01-28

## 2019-01-28 NOTE — PROGRESS NOTES
Clinic Care Coordination Contact  [facility]    Patient enrolled in Clinic Care Coordination with goal of improving overall diabetes management.      Clinical Data: Care Coordinator Outreach  Outreach attempted x 1.  Left message on voicemail with call back information and requested return call.  Plan:  Care Coordinator will try to reach patient again in 3-5 business days.    Gely Delgado RN   Clinic Care Coordinator-[facility]  PH: [phone]

## 2019-01-28 NOTE — LETTER
[facility] CARE COORDINATION  [address]  [place] [address]  February 7, 2019    Karthik Lopez  [address] [place] [address]      Dear Karthik,    I am a clinic care coordinator who works with Jonathan Hillman MD at [facility] . I recently tried to call and was unable to reach you.       I wanted to reach you in follow up to your recent visit with Dr Hillman to see if I could assist in helping facilitate any recommended follow up.   I also wanted to check in to see how you feel your Diabetes management and monitoring is going: if you need anything from us to support your healthcare goals, please don't hesitate to reach out.     Please feel free to contact me at [phone], with any questions or concerns. We at [facility] are focused on providing you with the highest-quality healthcare experience possible and that all starts with you.     Sincerely,     Gely Delgado RN   Clinic Care Coordinator-[facility]  PH: [phone]

## 2019-01-31 NOTE — PROGRESS NOTES
SUBJECTIVE:   CC: Karthik Lopez is an 43 year old male who presents for preventative health visit.     Physical   Annual:     Getting at least 3 servings of Calcium per day:  Yes    Bi-annual eye exam:  Yes    Dental care twice a year:  NO    Sleep apnea or symptoms of sleep apnea:  Sleep apnea    Diet:  Regular (no restrictions)    Frequency of exercise:  None    Taking medications regularly:  Yes    Medication side effects:  None    Additional concerns today:  No    PHQ-2 Total Score: 0      Diabetes Follow-up    Diabetes and hypothyroidism is managed by endocrinologist at [facility].  Recently seen, however have not received records yet.  Most recent visit we have 2/10/18.    [facility]    - Appt in 2019 with DM educator to discuss continuous glucose monitoring   - Dietician - has been recommended by endo, but has not gone    Patient is checking blood sugars: three times daily.   Blood sugar testing frequency justification:    Results are as follows:         Average 130's    Diabetic concerns: None     Symptoms of hypoglycemia (low blood sugar): none     Paresthesias (numbness or burning in feet) or sores: No     Date of last diabetic eye exam: 2018    BP Readings from Last 2 Encounters:   19 122/74   19 122/66     Hemoglobin A1C (%)   Date Value   2019 9.4 (H)   2018 9.3 (H)       Diabetes Management Resources    Today's PHQ-2 Score:   PHQ-2 (  [employer]) 2019   Q1: Little interest or pleasure in doing things 0   Q2: Feeling down, depressed or hopeless 0   PHQ-2 Score 0   Q1: Little interest or pleasure in doing things Not at all   Q2: Feeling down, depressed or hopeless Not at all   PHQ-2 Score 0       Abuse: Current or Past(Physical, Sexual or Emotional)- No  Do you feel safe in your environment? Yes    #ED:   Evaluation:  IIEF-5 scorin = mild to moderate    Modifiable risk factors: DM, lack of exercise, obesity  No HTN, tobacco use, or other CV disease  Will check  lipids today    Lab Results   Component Value Date    A1C 9.4 01/02/2019    A1C 9.3 12/31/2018    A1C 8.8 01/29/2018     BP Readings from Last 3 Encounters:   02/01/19 122/74   01/11/19 122/66   01/04/19 162/83     No results for input(s): CHOL, HDL, LDL, TRIG, CHOLHDLRATIO in the last 49766 hours.  The ASCVD Risk score (Tacomayefri LANDA Jr., et al., 2013) failed to calculate for the following reasons:    Cannot find a previous HDL lab    Cannot find a previous total cholesterol lab        Social History     Tobacco Use     Smoking status: Never Smoker     Smokeless tobacco: Never Used   Substance Use Topics     Alcohol use: Yes     Comment: once a week     Alcohol Use 2/1/2019   If you drink alcohol do you typically have greater than 3 drinks per day OR greater than 7 drinks per week? No   No flowsheet data found.    Last PSA: No results found for: PSA    Reviewed orders with patient. Reviewed health maintenance and updated orders accordingly - Yes    Reviewed and updated as needed this visit by clinical staff  Allergies  Meds         Reviewed and updated as needed this visit by Provider            Review of Systems   Constitutional: Negative for chills and fever.   HENT: Negative for congestion, ear pain, hearing loss and sore throat.    Eyes: Negative for pain and visual disturbance.   Respiratory: Negative for cough and shortness of breath.    Cardiovascular: Negative for chest pain, palpitations and peripheral edema.   Gastrointestinal: Negative for abdominal pain, constipation, diarrhea, heartburn, hematochezia and nausea.   Genitourinary: Positive for impotence. Negative for discharge, dysuria, frequency, genital sores, hematuria and urgency.   Musculoskeletal: Negative for arthralgias, joint swelling and myalgias.   Skin: Negative for rash.   Neurological: Negative for dizziness, weakness, headaches and paresthesias.   Psychiatric/Behavioral: Negative for mood changes. The patient is not nervous/anxious.   "      OBJECTIVE:   /74   Pulse 96   Temp 98.1  F (36.7  C) (Tympanic)   Ht 1.803 m (5' 11\")   Wt 130.4 kg (287 lb 6.4 oz)   SpO2 96%   BMI 40.08 kg/m      Physical Exam  GENERAL: healthy, alert and no distress  EYES: Eyes grossly normal to inspection, PERRL and conjunctivae and sclerae normal  HENT: ear canals and TM's normal, nose and mouth without ulcers or lesions  NECK: no adenopathy, no asymmetry, masses, or scars and thyroid normal to palpation  RESP: lungs clear to auscultation - no rales, rhonchi or wheezes  CV: regular rate and rhythm, normal S1 S2, no S3 or S4, no murmur, click or rub, no peripheral edema and peripheral pulses strong  ABDOMEN: soft, nontender, no hepatosplenomegaly, no masses and bowel sounds normal  MS: no gross musculoskeletal defects noted, no edema  SKIN: no suspicious lesions or rashes  NEURO: Normal strength and tone, mentation intact and speech normal  PSYCH: mentation appears normal, affect normal/bright    Diagnostic Test Results:  none     ASSESSMENT/PLAN:   1. Routine general medical examination at a health care facility  44 yo with poorly controlled type 1 DM and hypothyroidism here for annual preventive health physical.  Reviewed healthy BP and BMI ranges.  Counseled on lifestyle modifications for optimal mental and physical health.  Discussed age-appropriate health maintanence.  Additional concerns addressed.  - Lipid panel reflex to direct LDL Fasting  - NUTRITION REFERRAL  - Comprehensive metabolic panel (BMP + Alb, Alk Phos, ALT, AST, Total. Bili, TP)    2. Erectile dysfunction, unspecified erectile dysfunction type  Discussed modifiable risk factors that need optimization (DM, immobility, obesity).  Will try sildenafil as needed.  Will focus on increasing exercise - willing to see nutritionist as well.  - sildenafil (REVATIO) 20 MG tablet; Take 2-4 tabs as needed 30 minutes prior to sexual activity  Dispense: 30 tablet; Refill: 11    3. Type 1 diabetes " "mellitus with hyperglycemia (H)  Note well controlled - followed by endocrinologist.  Due for eye exam.  Has appointment scheduled with diabetic educator at [facility] in 2019.  - OPTOMETRY REFERRAL  - NUTRITION REFERRAL    4. Acquired hypothyroidism  Stable, managed by endocrinologist.    F/u within 4 weeks to go over labs, ASCVD score, continue discussion about modifiable risk factors.      COUNSELING:   Reviewed preventive health counseling, as reflected in patient instructions    BP Readings from Last 1 Encounters:   19 122/74     Estimated body mass index is 40.08 kg/m  as calculated from the following:    Height as of this encounter: 1.803 m (5' 11\").    Weight as of this encounter: 130.4 kg (287 lb 6.4 oz).      Weight management plan: Discussed healthy diet and exercise guidelines     reports that  has never smoked. he has never used smokeless tobacco.      Counseling Resources:  ATP IV Guidelines  Pooled Cohorts Equation Calculator  FRAX Risk Assessment  ICSI Preventive Guidelines  Dietary Guidelines for Americans,   [employer]'s MyPlate  ASA Prophylaxis  Lung CA Screening    Jonathan Hillman MD  [facility]    Evaluation:  IIEF-5 scorin = mild to moderate ED    Risk factors: HTN, DM, tobacco use, lack of exercise, obesity, other CV disease  Lab Results   Component Value Date    A1C 9.4 2019    A1C 9.3 2018    A1C 8.8 2018     BP Readings from Last 3 Encounters:   19 122/74   19 122/66   19 162/83     No results for input(s): CHOL, HDL, LDL, TRIG, CHOLHDLRATIO in the last 63664 hours.  The ASCVD Risk score (Viv LANDA Jr., et al., 2013) failed to calculate for the following reasons:    Cannot find a previous HDL lab    Cannot find a previous total cholesterol lab          "

## 2019-01-31 NOTE — PATIENT INSTRUCTIONS
DIABETES:  In order to dramatically decrease your risk of heart attack and stroke, as well as to reduce blindness, amputations, and erectile dysfunction caused by diabetes, you should be at the following goals:    1.  Your LDL (the bad cholesterol) should be below 100. (Get this test at least yearly)  2.  Your A-1-C should be below 7.0. (Get this test at least every six months)  3.  Your blood pressure should be below 130/80. (Measure at every visit)  4.  Your should not be using any tobacco products.  5.  You should be taking one aspirin tablet daily (This must be real aspirin, not Tylenol or ibuprofen).    Please study your blood pressure noted near the top of this handout, and ask for a print out of your last results, if you don't already have one.  Compare how you are doing with where you should be.  Together, we can increase both the quality and length of your life.  I want to have you around for a long time!    For ED:  Generic sildenafil 20 mg tabs - 2-4 tabs 30 minutes prior to sexual activity (pay out of pocket)      Preventive Health Recommendations  Male Ages 40 to 49    Yearly exam:             See your health care provider every year in order to  o   Review health changes.   o   Discuss preventive care.    o   Review your medicines if your doctor has prescribed any.    You should be tested each year for STDs (sexually transmitted diseases) if you re at risk.     Have a cholesterol test every 5 years.     Have a colonoscopy (test for colon cancer) if someone in your family has had colon cancer or polyps before age 50.     After age 45, have a diabetes test (fasting glucose). If you are at risk for diabetes, you should have this test every 3 years.      Talk with your health care provider about whether or not a prostate cancer screening test (PSA) is right for you.    Shots: Get a flu shot each year. Get a tetanus shot every 10 years.     Nutrition:    Eat at least 5 servings of fruits and vegetables  daily.     Eat whole-grain bread, whole-wheat pasta and brown rice instead of white grains and rice.     Get adequate Calcium and Vitamin D.     Lifestyle    Exercise for at least 150 minutes a week (30 minutes a day, 5 days a week). This will help you control your weight and prevent disease.     Limit alcohol to one drink per day.     No smoking.     Wear sunscreen to prevent skin cancer.     See your dentist every six months for an exam and cleaning.

## 2019-02-01 ENCOUNTER — OFFICE VISIT (OUTPATIENT)
Dept: PEDIATRICS | Facility: CLINIC | Age: 44
End: 2019-02-01
Payer: COMMERCIAL

## 2019-02-01 VITALS
HEIGHT: 71 IN | HEART RATE: 96 BPM | OXYGEN SATURATION: 96 % | SYSTOLIC BLOOD PRESSURE: 122 MMHG | TEMPERATURE: 98.1 F | BODY MASS INDEX: 40.23 KG/M2 | DIASTOLIC BLOOD PRESSURE: 74 MMHG | WEIGHT: 287.4 LBS

## 2019-02-01 DIAGNOSIS — N52.9 ERECTILE DYSFUNCTION, UNSPECIFIED ERECTILE DYSFUNCTION TYPE: ICD-10-CM

## 2019-02-01 DIAGNOSIS — Z00.00 ROUTINE GENERAL MEDICAL EXAMINATION AT A HEALTH CARE FACILITY: Primary | ICD-10-CM

## 2019-02-01 DIAGNOSIS — E10.65 TYPE 1 DIABETES MELLITUS WITH HYPERGLYCEMIA (H): ICD-10-CM

## 2019-02-01 DIAGNOSIS — E03.9 ACQUIRED HYPOTHYROIDISM: ICD-10-CM

## 2019-02-01 PROBLEM — E11.10 DKA (DIABETIC KETOACIDOSIS) (H): Status: RESOLVED | Noted: 2018-12-31 | Resolved: 2019-02-01

## 2019-02-01 LAB
ALBUMIN SERPL-MCNC: 4 G/DL (ref 3.4–5)
ALP SERPL-CCNC: 81 U/L (ref 40–150)
ALT SERPL W P-5'-P-CCNC: 22 U/L (ref 0–70)
ANION GAP SERPL CALCULATED.3IONS-SCNC: 5 MMOL/L (ref 3–14)
AST SERPL W P-5'-P-CCNC: 16 U/L (ref 0–45)
BILIRUB SERPL-MCNC: 0.6 MG/DL (ref 0.2–1.3)
BUN SERPL-MCNC: 15 MG/DL (ref 7–30)
CALCIUM SERPL-MCNC: 8.5 MG/DL (ref 8.5–10.1)
CHLORIDE SERPL-SCNC: 106 MMOL/L (ref 94–109)
CHOLEST SERPL-MCNC: 201 MG/DL
CO2 SERPL-SCNC: 29 MMOL/L (ref 20–32)
CREAT SERPL-MCNC: 0.83 MG/DL (ref 0.66–1.25)
GFR SERPL CREATININE-BSD FRML MDRD: >90 ML/MIN/{1.73_M2}
GLUCOSE SERPL-MCNC: 197 MG/DL (ref 70–99)
HDLC SERPL-MCNC: 40 MG/DL
LDLC SERPL CALC-MCNC: 143 MG/DL
NONHDLC SERPL-MCNC: 161 MG/DL
POTASSIUM SERPL-SCNC: 4.1 MMOL/L (ref 3.4–5.3)
PROT SERPL-MCNC: 7.2 G/DL (ref 6.8–8.8)
SODIUM SERPL-SCNC: 140 MMOL/L (ref 133–144)
TRIGL SERPL-MCNC: 89 MG/DL

## 2019-02-01 PROCEDURE — 80061 LIPID PANEL: CPT | Performed by: INTERNAL MEDICINE

## 2019-02-01 PROCEDURE — 80053 COMPREHEN METABOLIC PANEL: CPT | Performed by: INTERNAL MEDICINE

## 2019-02-01 PROCEDURE — 99396 PREV VISIT EST AGE 40-64: CPT | Performed by: INTERNAL MEDICINE

## 2019-02-01 PROCEDURE — 36415 COLL VENOUS BLD VENIPUNCTURE: CPT | Performed by: INTERNAL MEDICINE

## 2019-02-01 RX ORDER — SILDENAFIL CITRATE 20 MG/1
TABLET ORAL
Qty: 30 TABLET | Refills: 11 | Status: SHIPPED | OUTPATIENT
Start: 2019-02-01 | End: 2021-07-26

## 2019-02-01 ASSESSMENT — ENCOUNTER SYMPTOMS
HEMATOCHEZIA: 0
ABDOMINAL PAIN: 0
JOINT SWELLING: 0
CHILLS: 0
DIZZINESS: 0
FREQUENCY: 0
MYALGIAS: 0
FEVER: 0
PARESTHESIAS: 0
PALPITATIONS: 0
HEADACHES: 0
DYSURIA: 0
SHORTNESS OF BREATH: 0
ARTHRALGIAS: 0
COUGH: 0
HEARTBURN: 0
SORE THROAT: 0
NERVOUS/ANXIOUS: 0
EYE PAIN: 0
DIARRHEA: 0
HEMATURIA: 0
NAUSEA: 0
WEAKNESS: 0
CONSTIPATION: 0

## 2019-02-01 ASSESSMENT — MIFFLIN-ST. JEOR: SCORE: 2230.77

## 2019-02-01 NOTE — PROGRESS NOTES
Clinic Care Coordination Contact    Situation: Patient chart reviewed by care coordinator.    Background: RN CC following patient to promote connection to clinic and community resources to encourage optimal management of diabetes.     Assessment: Patient presented to clinic today for established follow up with PCP; RN CC unable to meet with patient while in clinic. New referrals placed for Optometry and Nutrition.     Plan/Recommendations: RN CC will outreach to patient in 3-5 business days to inquire about status of established appointments with recommended referrals. Will assist in facilitating appointment if needed.     Gely Delgado RN   Clinic Care Coordinator-[facility]  PH: [phone]

## 2019-02-07 NOTE — PROGRESS NOTES
Clinic Care Coordination Contact  [facility]/Voicemail       Clinical Data: Care Coordinator Outreach  Outreach attempted x 2.  Left message on voicemail with call back information and requested return call.  Plan: Care Coordinator will mail out Care Coordination follow up letter. Care Coordinator will try to reach patient again in 10-14 business days.    Gely Delgado RN   Clinic Care Coordinator-[facility]  PH: [phone]

## 2019-02-22 ENCOUNTER — PATIENT OUTREACH (OUTPATIENT)
Dept: CARE COORDINATION | Facility: CLINIC | Age: 44
End: 2019-02-22

## 2019-02-22 NOTE — PROGRESS NOTES
"Clinic Care Coordination Contact    Clinic Care Coordination Contact  OUTREACH    Referral Information:  Patient enrolled in Clinic Care Coordination to promote access to care, education and improved Diabetes     Primary Diagnosis: Diabetes    RN CC outreached to patient, re-introduced self and role of Clinic Care Coordination; patient seemed rushed on the phone, so encounter was brief and focused.    Clinical Concerns:  Patient reports in quick summary that he is \"doing really well\"; he states he is monitoring his blood sugars at least daily, no issues or concerns in ability to secure medications and adhere to recommended medication regimen.   Denies any new or acute symptoms related to his Diabetes.     Medication Management:  Denies any concerns related to the abilty to obtain his medications or take them in relation to his Diabetes Care    Resources and Interventions:  Current Resources:          Goals:   Goals        General    Improve chronic symptoms (pt-stated)     Notes - Note created  1/11/2019  4:04 PM by Gely Delgado RN    Goal Statement: I want to gain better control of my diabetes and stay engaged with my healthcare team to prevent long term health care issues related to underlying diabetes  Measure of Success: Evidenced by A1C of < 7.0 and patient's continued engagement with interdisciplinary care team; free of any recurrent hospitalizations in 2019 due to diabetes or diabetic complications  Supportive Steps to Achieve: I will attend a Diabetes Education session      I will begin to incorporate consistent moderate-intensity activity into my weekly routine      I will complete additional annual wellness screenings, as recommended by my PCP      I will monitor my blood sugars consistently every day  Barriers: Patient self reports he has historically been non-engaged and/or has not sustained prior engagement  Strengths: Motivated to implement lifestyle changes; has a good understanding of proper " diabetes management and self care as well as able to articulate potential consequences of poor control  Date to Achieve By: 9 months September 11th, 2019              As of today's date 2/22/2019 goal is met at 0 - 25%.   Goal Status:  Active  Patient is scheduled to see Diabetes Education on 3/5/2019; he reports daily monitoring of his blood sugars at this time    Patient/Caregiver understanding:     Outreach Frequency: 2 weeks  Future Appointments              In 1 week  DIABETIC ED RESOURCE [place] [facility]    In 1 month Hattie Hillman Mai, MD [facility]          Plan:   Patient scheduled to see Diabetes Education for his initial visit on 3/5/19  RN CC will schedule next RN CC outreach to coincide with patient's next PCP office visit with Dr Hillman on 3/25/19; encouraged patient to outreach to this writer sooner with any questions or concerns.     Gely Delgado RN   Clinic Care Coordinator-[facility]  PH: [phone]

## 2019-03-26 ENCOUNTER — PATIENT OUTREACH (OUTPATIENT)
Dept: CARE COORDINATION | Facility: CLINIC | Age: 44
End: 2019-03-26

## 2019-03-26 NOTE — PROGRESS NOTES
Clinic Care Coordination Contact  [facility]/Voicemail    Patient enrolled in Clinic Care Coordination to support optimization of Diabetes management.   Chart Reviewed: patient cancelled his 3/5/19 office visit with Diabetes Education.   Was a No-Show for scheduled follow up with PCP today, 3/26/19.    Clinical Data: Care Coordinator Outreach  Outreach attempted x 1.  Left message on voicemail with call back information and requested return call.  Plan:Care Coordinator will try to reach patient again in 3-5 business days.    Gely Delgado RN   [facility]  PH: [phone]

## 2019-03-26 NOTE — LETTER
[facility] CARE COORDINATION  [address]  [place] [address]  April 2, 2019    Karthik Lopez  [address] [place] [address]      Dear Karthik,    I am a clinic care coordinator who works with MENDY Kenney MD, MD at [facility]. I recently tried to call and was unable to reach you. I wanted to follow up with you to see if there is anything outstanding that the Primary Care team can be of assistance with.        Please feel free to contact me at [phone], with any questions or concerns. We at [facility] are focused on providing you with the highest-quality healthcare experience possible and that all starts with you.     Sincerely,     Gely Delgado RN   Clinic Care Coordinator-[facility]  PH: [phone]

## 2019-04-02 NOTE — PROGRESS NOTES
Clinic Care Coordination Contact  [facility]/Voicemail       Clinical Data: Care Coordinator Outreach  Outreach attempted x 2.  Left message on voicemail with call back information and requested return call.  Plan: Care Coordinator will mail out care coordination follow-up letter with care coordinator contact information and explanation of care coordination services. Care Coordinator will do no further outreaches at this time.    Gely Delgado RN   Clinic Care Coordinator-[facility]  PH: [phone]

## 2019-08-02 ENCOUNTER — TRANSFERRED RECORDS (OUTPATIENT)
Dept: HEALTH INFORMATION MANAGEMENT | Facility: CLINIC | Age: 44
End: 2019-08-02

## 2019-09-27 ENCOUNTER — TELEPHONE (OUTPATIENT)
Dept: PEDIATRICS | Facility: CLINIC | Age: 44
End: 2019-09-27

## 2019-09-27 NOTE — LETTER
December 3, 2019      Karthik Lopez  [address] [place] [address]        Dear Karthik,       We care about your health and have reviewed your health plan including your medical conditions, medications, and lab results.  Based on this review, it is recommended that you follow up regarding the following health topic(s):   -Diabetes     We recommend you take the following action(s):   -schedule a FOLLOWUP OFFICE APPOINTMENT.  We will perform the following labs:  A1c, Lipids (fasting cholesterol - nothing to eat except water and/or meds for 8-10 hours), and Microablumin.   -schedule an eye exam.       Please call us at the [facility] - [phone] (or use Likeastore) to address the above recommendations.  To schedule an eye exam at a [facility] location you can call [phone].     Thank you for trusting [facility] and we appreciate the opportunity to serve you.  We look forward to supporting your healthcare needs in the future.     Healthy Regards,     Your Health Care Team   [facility]

## 2019-10-11 NOTE — TELEPHONE ENCOUNTER
Panel Management Review      Patient has the following on his problem list:     Diabetes    ASA: Passed    Last A1C  Lab Results   Component Value Date    A1C 9.4 01/02/2019    A1C 9.3 12/31/2018    A1C 8.8 01/29/2018     A1C tested: FAILED    Last LDL:    Lab Results   Component Value Date    CHOL 201 02/01/2019     Lab Results   Component Value Date    HDL 40 02/01/2019     Lab Results   Component Value Date     02/01/2019     Lab Results   Component Value Date    TRIG 89 02/01/2019     No results found for: CHOLHDLRATIO  Lab Results   Component Value Date    NHDL 161 02/01/2019       Is the patient on a Statin? NO             Is the patient on Aspirin? YES    Medications     Salicylates     aspirin 81 MG tablet             Last three blood pressure readings:  BP Readings from Last 3 Encounters:   02/01/19 122/74   01/11/19 122/66   01/04/19 162/83       Date of last diabetes office visit: 02/01/2019     Tobacco History:     History   Smoking Status     Never Smoker   Smokeless Tobacco     Never Used           Composite cancer screening  Chart review shows that this patient is due/due soon for the following None  Summary:    Patient is due/failing the following:   Eye exam, diabetes follow up,  A1C, LDL and PHYSICAL    Action needed:   Patient needs office visit for physical and diabetes f/u., Patient needs fasting lab only appointment and eye exam    Type of outreach:    Sent Cynny message.    Questions for provider review:    None                                                                                                                                    KEVIN HICKS MA on 9/27/2019 at 4:58 PM                  no

## 2020-03-01 ENCOUNTER — HEALTH MAINTENANCE LETTER (OUTPATIENT)
Age: 45
End: 2020-03-01

## 2020-07-17 ENCOUNTER — TRANSFERRED RECORDS (OUTPATIENT)
Dept: HEALTH INFORMATION MANAGEMENT | Facility: CLINIC | Age: 45
End: 2020-07-17

## 2020-12-13 ENCOUNTER — HEALTH MAINTENANCE LETTER (OUTPATIENT)
Age: 45
End: 2020-12-13

## 2021-03-24 ENCOUNTER — IMMUNIZATION (OUTPATIENT)
Dept: NURSING | Facility: CLINIC | Age: 46
End: 2021-03-24
Payer: COMMERCIAL

## 2021-03-24 PROCEDURE — 91300 PR COVID VAC PFIZER DIL RECON 30 MCG/0.3 ML IM: CPT

## 2021-03-24 PROCEDURE — 0001A PR COVID VAC PFIZER DIL RECON 30 MCG/0.3 ML IM: CPT

## 2021-04-14 ENCOUNTER — OFFICE VISIT (OUTPATIENT)
Dept: NURSING | Facility: CLINIC | Age: 46
End: 2021-04-14
Attending: FAMILY MEDICINE
Payer: COMMERCIAL

## 2021-04-14 PROCEDURE — 0002A PR COVID VAC PFIZER DIL RECON 30 MCG/0.3 ML IM: CPT

## 2021-04-14 PROCEDURE — 91300 PR COVID VAC PFIZER DIL RECON 30 MCG/0.3 ML IM: CPT

## 2021-04-17 ENCOUNTER — HEALTH MAINTENANCE LETTER (OUTPATIENT)
Age: 46
End: 2021-04-17

## 2021-07-26 ENCOUNTER — OFFICE VISIT (OUTPATIENT)
Dept: PEDIATRICS | Facility: CLINIC | Age: 46
End: 2021-07-26
Payer: COMMERCIAL

## 2021-07-26 VITALS
SYSTOLIC BLOOD PRESSURE: 112 MMHG | WEIGHT: 315 LBS | TEMPERATURE: 98.7 F | HEART RATE: 119 BPM | HEIGHT: 71 IN | OXYGEN SATURATION: 96 % | BODY MASS INDEX: 44.1 KG/M2 | DIASTOLIC BLOOD PRESSURE: 66 MMHG

## 2021-07-26 DIAGNOSIS — M75.42 ROTATOR CUFF IMPINGEMENT SYNDROME OF LEFT SHOULDER: ICD-10-CM

## 2021-07-26 DIAGNOSIS — E03.9 ACQUIRED HYPOTHYROIDISM: ICD-10-CM

## 2021-07-26 DIAGNOSIS — E66.01 MORBID OBESITY (H): ICD-10-CM

## 2021-07-26 DIAGNOSIS — Z13.220 SCREENING FOR HYPERLIPIDEMIA: ICD-10-CM

## 2021-07-26 DIAGNOSIS — E10.65 TYPE 1 DIABETES MELLITUS WITH HYPERGLYCEMIA (H): ICD-10-CM

## 2021-07-26 DIAGNOSIS — N52.9 ERECTILE DYSFUNCTION, UNSPECIFIED ERECTILE DYSFUNCTION TYPE: ICD-10-CM

## 2021-07-26 DIAGNOSIS — Z00.00 ROUTINE GENERAL MEDICAL EXAMINATION AT A HEALTH CARE FACILITY: Primary | ICD-10-CM

## 2021-07-26 DIAGNOSIS — Z11.59 NEED FOR HEPATITIS C SCREENING TEST: ICD-10-CM

## 2021-07-26 DIAGNOSIS — Z11.4 SCREENING FOR HIV (HUMAN IMMUNODEFICIENCY VIRUS): ICD-10-CM

## 2021-07-26 PROCEDURE — 99214 OFFICE O/P EST MOD 30 MIN: CPT | Mod: 25 | Performed by: INTERNAL MEDICINE

## 2021-07-26 PROCEDURE — 99396 PREV VISIT EST AGE 40-64: CPT | Performed by: INTERNAL MEDICINE

## 2021-07-26 RX ORDER — ROSUVASTATIN CALCIUM 20 MG/1
20 TABLET, COATED ORAL DAILY
COMMUNITY
Start: 2021-04-19

## 2021-07-26 RX ORDER — TOPIRAMATE 25 MG/1
TABLET, FILM COATED ORAL
COMMUNITY
Start: 2021-02-24

## 2021-07-26 RX ORDER — SILDENAFIL 50 MG/1
50 TABLET, FILM COATED ORAL DAILY PRN
Qty: 30 TABLET | Refills: 11 | Status: SHIPPED | OUTPATIENT
Start: 2021-07-26

## 2021-07-26 ASSESSMENT — ENCOUNTER SYMPTOMS
NERVOUS/ANXIOUS: 0
SHORTNESS OF BREATH: 0
JOINT SWELLING: 0
HEARTBURN: 0
FREQUENCY: 0
DYSURIA: 0
WEAKNESS: 0
DIZZINESS: 0
FEVER: 0
CONSTIPATION: 0
ARTHRALGIAS: 1
MYALGIAS: 0
DIARRHEA: 0
COUGH: 0
SORE THROAT: 0
PALPITATIONS: 0
HEADACHES: 0
PARESTHESIAS: 1
ABDOMINAL PAIN: 0
NAUSEA: 0
CHILLS: 0
EYE PAIN: 0
HEMATURIA: 0
HEMATOCHEZIA: 0

## 2021-07-26 ASSESSMENT — MIFFLIN-ST. JEOR: SCORE: 2538.28

## 2021-07-26 NOTE — PROGRESS NOTES
SUBJECTIVE:   CC: Karthik Lopez is an 46 year old male who presents for preventative health visit.       Patient has been advised of split billing requirements and indicates understanding: Yes  Healthy Habits:     Getting at least 3 servings of Calcium per day:  Yes    Bi-annual eye exam:  Yes    Dental care twice a year:  NO    Sleep apnea or symptoms of sleep apnea:  Sleep apnea    Diet:  Regular (no restrictions)    Frequency of exercise:  None    Taking medications regularly:  Yes    Medication side effects:  None    PHQ-2 Total Score: 0    Additional concerns today:  Yes    1. Gained weight over the last year  2. Sore back  3. Sore left shoulder  4. DM - has dexcom, skin is reacting to adhesive  -- [facility]    Type 1 DM and thyroid managed by [facility]      Today's PHQ-2 Score:   PHQ-2 ( 1999 Pfizer) 7/26/2021   Q1: Little interest or pleasure in doing things 0   Q2: Feeling down, depressed or hopeless 0   PHQ-2 Score 0   Q1: Little interest or pleasure in doing things Not at all   Q2: Feeling down, depressed or hopeless Not at all   PHQ-2 Score 0       Abuse: Current or Past(Physical, Sexual or Emotional)- No  Do you feel safe in your environment? Yes    Have you ever done Advance Care Planning? (For example, a Health Directive, POLST, or a discussion with a medical provider or your loved ones about your wishes): No, advance care planning information given to patient to review.  Patient plans to discuss their wishes with loved ones or provider.      Social History     Tobacco Use     Smoking status: Never Smoker     Smokeless tobacco: Never Used   Substance Use Topics     Alcohol use: Yes     Comment: once a week     If you drink alcohol do you typically have >3 drinks per day or >7 drinks per week? No    Alcohol Use 7/26/2021   Prescreen: >3 drinks/day or >7 drinks/week? No   Prescreen: >3 drinks/day or >7 drinks/week? -   No flowsheet data found.    Last PSA: No results found for: PSA    Reviewed orders with  "patient. Reviewed health maintenance and updated orders accordingly - Yes  Lab work is in process    Reviewed and updated as needed this visit by clinical staff  Tobacco   Meds              Reviewed and updated as needed this visit by Provider                    Review of Systems   Constitutional: Negative for chills and fever.   HENT: Negative for congestion, ear pain, hearing loss and sore throat.    Eyes: Negative for pain and visual disturbance.   Respiratory: Negative for cough and shortness of breath.    Cardiovascular: Positive for peripheral edema. Negative for chest pain and palpitations.   Gastrointestinal: Negative for abdominal pain, constipation, diarrhea, heartburn, hematochezia and nausea.   Genitourinary: Positive for impotence. Negative for dysuria, frequency, genital sores, hematuria and urgency.   Musculoskeletal: Positive for arthralgias. Negative for joint swelling and myalgias.   Skin: Positive for rash.   Neurological: Positive for paresthesias. Negative for dizziness, weakness and headaches.   Psychiatric/Behavioral: Negative for mood changes. The patient is not nervous/anxious.          OBJECTIVE:   /66 (BP Location: Right arm, Patient Position: Chair, Cuff Size: Thigh)   Pulse 119   Temp 98.7  F (37.1  C) (Tympanic)   Ht 1.803 m (5' 11\")   Wt (!) 162.6 kg (358 lb 8 oz)   SpO2 96%   BMI 50.00 kg/m      Physical Exam  GENERAL: obese, alert and no distress  EYES: Eyes grossly normal to inspection, PERRL and conjunctivae and sclerae normal  HENT: ear canals and TM's normal, nose and mouth without ulcers or lesions  NECK: no adenopathy, no asymmetry, masses, or scars and thyroid normal to palpation  RESP: lungs clear to auscultation - no rales, rhonchi or wheezes  CV: regular rate and rhythm, normal S1 S2, no S3 or S4, no murmur, click or rub, no peripheral edema and peripheral pulses strong  ABDOMEN: soft, nontender, no hepatosplenomegaly, no masses and bowel sounds normal  MS: no " gross musculoskeletal defects noted, no edema  SKIN: no suspicious lesions or rashes  NEURO: Normal strength and tone, mentation intact and speech normal  PSYCH: mentation appears normal, affect normal/bright    Left - No deformities on inspection.    No tenderness at the acromioclavicular or sternoclavicular joint, coracoid process, acromion, or scapula.    Abnormal ROM - apley scratch test (abduction and external rotation/internal rotation and adduction).  Provocative rotator cuff testing: lift-off test (subscapularis) positive, empty can test negative (supraspinatus), Neer s sign (subacromial impingement) positive, normal external rotation against resistance (infraspinatus/teres minor) and internal rotation against resistance, drop-arm test (cuff tear) negative, Spurling s (cervical nerve root disorder) test negative bilaterally.      Diagnostic Test Results:  Labs reviewed in Epic    ASSESSMENT/PLAN:   1. Routine general medical examination at a health care facility  - HIV Antigen Antibody Combo; Future    2. Rotator cuff impingement syndrome of left shoulder  Start with PT, however low threshold to send to ortho for further eval.  Of note, ROM during provocative tests limited by pain vs true weakness - further eval by ortho may be needed if no improvement after 3-4 weeks of therapy.  - [facility] Referral; Future    3. Type 1 diabetes mellitus with hyperglycemia (H)  Has not been well controlled in past  Sees [facility] at [facility] - due for labs - has appt with them tomorrow  Will have him f/up with me in 4 weeks, after visit  - Lipid panel reflex to direct LDL Fasting; Future    4. Obesity (BMI 35.0-39.9) with comorbidity (H)  Has gained weight.  Weight loss meds managed by endocrinologist.  Offered referral to our weight management clinic if he needs more support (I.e. health ).  Will f/up in 4 weeks after visit to readdress.    5. Acquired hypothyroidism  Managed by endo    6. Erectile dysfunction,  "unspecified erectile dysfunction type  - Stable, no side effects with medications, refilled meds today  - sildenafil (VIAGRA) 50 MG tablet; Take 1 tablet (50 mg) by mouth daily as needed (30 minutes prior to sexual activity)  Dispense: 30 tablet; Refill: 11    7. Screening for HIV (human immunodeficiency virus)    8. Need for hepatitis C screening test  - Hepatitis C Screen Reflex to HCV RNA Quant and Genotype; Future    9. Screening for hyperlipidemia        Patient has been advised of split billing requirements and indicates understanding: No  COUNSELING:   Reviewed preventive health counseling, as reflected in patient instructions    Estimated body mass index is 50 kg/m  as calculated from the following:    Height as of this encounter: 1.803 m (5' 11\").    Weight as of this encounter: 162.6 kg (358 lb 8 oz).     Weight management plan: Discussed healthy diet and exercise guidelines    He reports that he has never smoked. He has never used smokeless tobacco.      Counseling Resources:  ATP IV Guidelines  Pooled Cohorts Equation Calculator  FRAX Risk Assessment  ICSI Preventive Guidelines  Dietary Guidelines for Americans, 2010  USDA's MyPlate  ASA Prophylaxis  Lung CA Screening    Jonathan Hillman MD  [facility]  "

## 2021-07-26 NOTE — PATIENT INSTRUCTIONS
Referral for PT for left rotator cuff tendinitis  Contact me after 3-4 weeks if no improvement, then I will refer you to orthopedic for further evaluation.    Preventive Health Recommendations  Male Ages 40 to 49    Yearly exam:             See your health care provider every year in order to  o   Review health changes.   o   Discuss preventive care.    o   Review your medicines if your doctor has prescribed any.    You should be tested each year for STDs (sexually transmitted diseases) if you re at risk.     Have a cholesterol test every 5 years.     Have a colonoscopy (test for colon cancer) if someone in your family has had colon cancer or polyps before age 50.     After age 45, have a diabetes test (fasting glucose). If you are at risk for diabetes, you should have this test every 3 years.      Talk with your health care provider about whether or not a prostate cancer screening test (PSA) is right for you.    Shots: Get a flu shot each year. Get a tetanus shot every 10 years.     Nutrition:    Eat at least 5 servings of fruits and vegetables daily.     Eat whole-grain bread, whole-wheat pasta and brown rice instead of white grains and rice.     Get adequate Calcium and Vitamin D.     Lifestyle    Exercise for at least 150 minutes a week (30 minutes a day, 5 days a week). This will help you control your weight and prevent disease.     Limit alcohol to one drink per day.     No smoking.     Wear sunscreen to prevent skin cancer.     See your dentist every six months for an exam and cleaning.

## 2021-07-27 ENCOUNTER — TRANSFERRED RECORDS (OUTPATIENT)
Dept: HEALTH INFORMATION MANAGEMENT | Facility: CLINIC | Age: 46
End: 2021-07-27

## 2021-07-27 LAB — HEP C HIM: NORMAL

## 2021-08-07 ENCOUNTER — HEALTH MAINTENANCE LETTER (OUTPATIENT)
Age: 46
End: 2021-08-07

## 2021-08-12 ENCOUNTER — THERAPY VISIT (OUTPATIENT)
Dept: PHYSICAL THERAPY | Facility: CLINIC | Age: 46
End: 2021-08-12
Attending: INTERNAL MEDICINE
Payer: COMMERCIAL

## 2021-08-12 DIAGNOSIS — M25.512 SHOULDER PAIN, LEFT: ICD-10-CM

## 2021-08-12 DIAGNOSIS — M75.42 ROTATOR CUFF IMPINGEMENT SYNDROME OF LEFT SHOULDER: ICD-10-CM

## 2021-08-12 PROCEDURE — 97110 THERAPEUTIC EXERCISES: CPT | Mod: GP | Performed by: PHYSICAL THERAPIST

## 2021-08-12 PROCEDURE — 97161 PT EVAL LOW COMPLEX 20 MIN: CPT | Mod: GP | Performed by: PHYSICAL THERAPIST

## 2021-08-19 ENCOUNTER — THERAPY VISIT (OUTPATIENT)
Dept: PHYSICAL THERAPY | Facility: CLINIC | Age: 46
End: 2021-08-19
Attending: INTERNAL MEDICINE
Payer: COMMERCIAL

## 2021-08-19 DIAGNOSIS — M25.512 SHOULDER PAIN, LEFT: ICD-10-CM

## 2021-08-19 PROCEDURE — 97140 MANUAL THERAPY 1/> REGIONS: CPT | Mod: GP | Performed by: PHYSICAL THERAPIST

## 2021-08-19 PROCEDURE — 97110 THERAPEUTIC EXERCISES: CPT | Mod: GP | Performed by: PHYSICAL THERAPIST

## 2021-08-26 ENCOUNTER — OFFICE VISIT (OUTPATIENT)
Dept: PEDIATRICS | Facility: CLINIC | Age: 46
End: 2021-08-26
Payer: COMMERCIAL

## 2021-08-26 VITALS
DIASTOLIC BLOOD PRESSURE: 80 MMHG | SYSTOLIC BLOOD PRESSURE: 120 MMHG | HEART RATE: 100 BPM | OXYGEN SATURATION: 96 % | TEMPERATURE: 97.7 F | WEIGHT: 315 LBS | RESPIRATION RATE: 24 BRPM | BODY MASS INDEX: 46.03 KG/M2

## 2021-08-26 DIAGNOSIS — E66.01 MORBID OBESITY (H): ICD-10-CM

## 2021-08-26 DIAGNOSIS — M25.512 CHRONIC LEFT SHOULDER PAIN: ICD-10-CM

## 2021-08-26 DIAGNOSIS — Z00.00 ENCOUNTER FOR MEDICAL EXAMINATION TO ESTABLISH CARE: Primary | ICD-10-CM

## 2021-08-26 DIAGNOSIS — E10.65 TYPE 1 DIABETES MELLITUS WITH HYPERGLYCEMIA (H): ICD-10-CM

## 2021-08-26 DIAGNOSIS — G89.29 CHRONIC LEFT SHOULDER PAIN: ICD-10-CM

## 2021-08-26 PROCEDURE — 99214 OFFICE O/P EST MOD 30 MIN: CPT | Mod: 25 | Performed by: INTERNAL MEDICINE

## 2021-08-26 PROCEDURE — 90471 IMMUNIZATION ADMIN: CPT | Performed by: INTERNAL MEDICINE

## 2021-08-26 PROCEDURE — 90715 TDAP VACCINE 7 YRS/> IM: CPT | Performed by: INTERNAL MEDICINE

## 2021-08-26 RX ORDER — ERGOCALCIFEROL 1.25 MG/1
CAPSULE, LIQUID FILLED ORAL
COMMUNITY
Start: 2021-07-27

## 2021-08-26 RX ORDER — BUPROPION HYDROCHLORIDE 150 MG/1
150 TABLET ORAL DAILY
COMMUNITY
Start: 2021-07-27

## 2021-08-26 RX ORDER — POTASSIUM CHLORIDE 1500 MG/1
20 TABLET, EXTENDED RELEASE ORAL DAILY
COMMUNITY
Start: 2021-07-27

## 2021-08-26 RX ORDER — PHENTERMINE HYDROCHLORIDE 37.5 MG/1
TABLET ORAL
COMMUNITY
Start: 2021-07-27

## 2021-08-26 RX ORDER — INSULIN GLARGINE 100 [IU]/ML
72 INJECTION, SOLUTION SUBCUTANEOUS DAILY
COMMUNITY
Start: 2021-08-26

## 2021-08-26 RX ORDER — FUROSEMIDE 20 MG
20 TABLET ORAL DAILY
COMMUNITY
Start: 2021-07-27

## 2021-08-26 RX ORDER — PROCHLORPERAZINE 25 MG/1
SUPPOSITORY RECTAL
COMMUNITY
Start: 2021-08-10

## 2021-08-26 NOTE — PROGRESS NOTES
Assessment & Plan     1. Encounter for medical examination to establish care  Pt returning after requesting and reviewing records.    2. Chronic left shoulder pain  Improving with PT - diagnosed with frozen shoulder.  Continue for now.    3. Type 1 diabetes mellitus with hyperglycemia (H)  Sugars have improved over the last 30 days.  Managed by endocrinologist at [facility].    4. Obesity (BMI 35.0-39.9) with comorbidity (H)  Pt on phentermine, topiramate, and bupropion.  Followed at [facility].  Has lost 20 pounds.  We discussed strategies to stick with lifestyle changes.  Congratulated him on changes he's made so far.       Patient Instructions   For preventive health:     -- Tetanus shot today  -- Check insurance about starting colonoscopies at   -- Contact me if you are interested in the diabetes-specific physical therapy referral at [facility]  -- follow-up yearly for your annual check up - sooner if needed      Return in about 1 year (around 8/26/2022) for Preventive Visit.    Jonathan Hillman MD  [facility] is a 46 year old who presents for the following health issues     History of Present Illness       He eats 2-3 servings of fruits and vegetables daily.He consumes 1 sweetened beverage(s) daily.He exercises with enough effort to increase his heart rate 10 to 19 minutes per day.  He exercises with enough effort to increase his heart rate 3 or less days per week.   He is taking medications regularly.       Follow up [facility]            Review of Systems   All other systems on a 10-point review are negative, unless otherwise noted in HPI        Objective    /80 (BP Location: Right arm, Patient Position: Sitting, Cuff Size: Adult Large)   Pulse 100   Temp 97.7  F (36.5  C) (Tympanic)   Resp 24   Wt 149.7 kg (330 lb)   SpO2 96%   BMI 46.03 kg/m    Body mass index is 46.03 kg/m .  Physical Exam   GENERAL: healthy, alert and no distress  EYES: Eyes grossly normal to  inspection  NECK: no asymmetry, masses, or scars  MS: no gross musculoskeletal defects noted, no edema  SKIN: no suspicious lesions or rashes  NEURO: mentation intact and speech normal  PSYCH: mentation appears normal and affect normal/bright

## 2021-08-26 NOTE — PATIENT INSTRUCTIONS
For preventive health:     -- Tetanus shot today  -- Check insurance about starting colonoscopies at 47  -- Contact me if you are interested in the diabetes-specific physical therapy referral at [facility]  -- follow-up yearly for your annual check up - sooner if needed

## 2021-08-26 NOTE — PROGRESS NOTES
Please call DANYEL [phone] to request recent labs (which I need for visit).  We have WALKER already thanks.

## 2021-09-01 ENCOUNTER — THERAPY VISIT (OUTPATIENT)
Dept: PHYSICAL THERAPY | Facility: CLINIC | Age: 46
End: 2021-09-01
Payer: COMMERCIAL

## 2021-09-01 DIAGNOSIS — G89.29 CHRONIC LEFT SHOULDER PAIN: ICD-10-CM

## 2021-09-01 DIAGNOSIS — M25.512 CHRONIC LEFT SHOULDER PAIN: ICD-10-CM

## 2021-09-01 PROCEDURE — 97110 THERAPEUTIC EXERCISES: CPT | Mod: GP | Performed by: PHYSICAL THERAPIST

## 2021-09-01 PROCEDURE — 97140 MANUAL THERAPY 1/> REGIONS: CPT | Mod: GP | Performed by: PHYSICAL THERAPIST

## 2021-09-17 ENCOUNTER — THERAPY VISIT (OUTPATIENT)
Dept: PHYSICAL THERAPY | Facility: CLINIC | Age: 46
End: 2021-09-17
Payer: COMMERCIAL

## 2021-09-17 DIAGNOSIS — G89.29 CHRONIC LEFT SHOULDER PAIN: ICD-10-CM

## 2021-09-17 DIAGNOSIS — M25.512 CHRONIC LEFT SHOULDER PAIN: ICD-10-CM

## 2021-09-17 PROCEDURE — 97140 MANUAL THERAPY 1/> REGIONS: CPT | Mod: GP | Performed by: PHYSICAL THERAPIST

## 2021-09-17 PROCEDURE — 97110 THERAPEUTIC EXERCISES: CPT | Mod: GP | Performed by: PHYSICAL THERAPIST

## 2021-10-02 ENCOUNTER — HEALTH MAINTENANCE LETTER (OUTPATIENT)
Age: 46
End: 2021-10-02

## 2021-10-29 ENCOUNTER — THERAPY VISIT (OUTPATIENT)
Dept: PHYSICAL THERAPY | Facility: CLINIC | Age: 46
End: 2021-10-29
Payer: COMMERCIAL

## 2021-10-29 DIAGNOSIS — M25.512 CHRONIC LEFT SHOULDER PAIN: ICD-10-CM

## 2021-10-29 DIAGNOSIS — G89.29 CHRONIC LEFT SHOULDER PAIN: ICD-10-CM

## 2021-10-29 PROCEDURE — 97110 THERAPEUTIC EXERCISES: CPT | Mod: GP | Performed by: PHYSICAL THERAPIST

## 2021-10-29 PROCEDURE — 97140 MANUAL THERAPY 1/> REGIONS: CPT | Mod: GP | Performed by: PHYSICAL THERAPIST

## 2021-12-23 PROBLEM — M25.512 SHOULDER PAIN, LEFT: Status: RESOLVED | Noted: 2021-08-12 | Resolved: 2021-12-23

## 2021-12-23 NOTE — PROGRESS NOTES
Discharge Note    Progress reporting period is from initial evaluation date (please see noted date below) to Oct 29, 2021.  Linked Episodes   Type: Episode: Status: Noted: Resolved: Last update: Updated by:   PHYSICAL THERAPY L shoulder 8/12/2021 Active 8/12/2021  10/29/2021  2:37 PM Beth Delatorre, PT      Comments:       Karthik failed to follow up and current status is unknown.  Please see information below for last relevant information on current status.  Patient seen for 5 visits.    SUBJECTIVE  Subjective changes noted by patient:  Not much improvement; however, hasn't been the most diligent with the stretches. Has slight improvement in ROM, but not much. Very busy at work.  .  Current pain level is 3/10.     Previous pain level was  4/10.   Changes in function:  Yes (See Goal flowsheet attached for changes in current functional level)  Adverse reaction to treatment or activity: None    OBJECTIVE  Changes noted in objective findings: AROM L Shoulder Flx; 147, Abd: 175, Ext: 57, Flx/ER: T1, Ext/IR: lateral buttock     ASSESSMENT/PLAN  Diagnosis: L shoulder pain (frozen shoulder)   Updated problem list and treatment plan:   Pain - HEP  Decreased ROM/flexibility - HEP  Decreased function - HEP  Impaired muscle performance - HEP  STG/LTGs have been met or progress has been made towards goals:  Yes, please see goal flowsheet for most current information  Assessment of Progress: current status is unknown.    Last current status: Pt has not made progress   Self Management Plans:  HEP  I have re-evaluated this patient and find that the nature, scope, duration and intensity of the therapy is appropriate for the medical condition of the patient.  Karthik continues to require the following intervention to meet STG and LTG's:  HEP.    Recommendations:  Discharge with current home program.  Patient to follow up with MD as needed.    Please refer to the daily flowsheet for treatment today, total treatment time and time  spent performing 1:1 timed codes.

## 2022-01-11 ENCOUNTER — PATIENT OUTREACH (OUTPATIENT)
Dept: PEDIATRICS | Facility: CLINIC | Age: 47
End: 2022-01-11
Payer: COMMERCIAL

## 2022-01-11 NOTE — TELEPHONE ENCOUNTER
Telephone call placed to patient, left a voice message with request for return call.    When the patient calls back:  -Is the patient interested in scheduling with Diabetes Education?    If so, a CDE referral needs to be sent to the provider.     Jeremias Schmid RN on 1/11/2022 at 1:23 PM

## 2022-03-13 ENCOUNTER — HEALTH MAINTENANCE LETTER (OUTPATIENT)
Age: 47
End: 2022-03-13

## 2022-08-28 ENCOUNTER — HEALTH MAINTENANCE LETTER (OUTPATIENT)
Age: 47
End: 2022-08-28

## 2023-01-14 ENCOUNTER — HEALTH MAINTENANCE LETTER (OUTPATIENT)
Age: 48
End: 2023-01-14

## 2023-07-10 ENCOUNTER — MYC MEDICAL ADVICE (OUTPATIENT)
Dept: PEDIATRICS | Facility: CLINIC | Age: 48
End: 2023-07-10
Payer: COMMERCIAL

## 2023-07-10 NOTE — TELEPHONE ENCOUNTER
Patient Quality Outreach Health Maintenance - PAL RN    Summary:    PAL RN contacted pt regarding overdue health maintenance    Patient is due/failing the following:   Diabetes -  A1C, Eye Exam, Microalbumin and Foot Exam  Colon Cancer Screening  Depression  -  Depression follow-up visit  Physical Preventive Adult Physical      Topic Date Due     Pneumococcal Vaccine (2 - PPSV23 if available, else PCV20) 11/28/2009     COVID-19 Vaccine (4 - Pfizer series) 12/19/2021       Health Maintenance Due   Topic Date Due     MICROALBUMIN  Never done     TSH W/FREE T4 REFLEX  Never done     DIABETIC FOOT EXAM  Never done     EYE EXAM  Never done     COLORECTAL CANCER SCREENING  Never done     HIV SCREENING  Never done     Pneumococcal Vaccine: Pediatrics (0 to 5 Years) and At-Risk Patients (6 to 64 Years) (2 - PPSV23 if available, else PCV20) 11/28/2009     A1C  07/02/2019     BMP  02/01/2020     LIPID  02/01/2020     COVID-19 Vaccine (4 - Pfizer series) 12/19/2021     YEARLY PREVENTIVE VISIT  07/26/2022     ANNUAL REVIEW OF HM ORDERS  07/26/2022     PHQ-2 (once per calendar year)  01/01/2023       Type of outreach:    Sent [employer] message.    - Advised patient if any questions or concerns comes up to call the PAL RN.   - Patient given opportunity to ask questions and at this time there is nothing further needed.     Questions for provider review:    None                                                    Ann CHIANG RN   Patient Advocate Liaison (PAL)  [facility]   [phone]

## 2023-07-10 NOTE — LETTER
January 15, 2024      Karthik Lopez  [address] [place] [address]        Dear Karthik,     We have been attempting to reach you to schedule your annual physical. Please call the clinic at [phone] to schedule or you may schedule through [facility].          Sincerely,        [facility]

## 2023-07-22 ENCOUNTER — HEALTH MAINTENANCE LETTER (OUTPATIENT)
Age: 48
End: 2023-07-22

## 2023-09-24 ENCOUNTER — HEALTH MAINTENANCE LETTER (OUTPATIENT)
Age: 48
End: 2023-09-24

## 2023-12-19 NOTE — Clinical Note
Did he get a tetanus shot yesterday?   If so, can someone administer it and sign?  If not, please delete order and sign encounter.  Thanks! oriented to person, place and time,  normal sensation

## 2024-01-04 NOTE — TELEPHONE ENCOUNTER
1st / 2nd Attempt Patient Quality Outreach Health Maintenance - PAL RN    Summary:    PAL RN attempted (attempt # 2) to contact pt regarding overdue health maintenance    Patient is due/failing the following:   Diabetes -  Eye Exam and Microalbumin  Colon Cancer Screening  Physical Preventive Adult Physical      Topic Date Due    Pneumococcal Vaccine (2 of 2 - PPSV23 or PCV20) 11/28/2009    Flu Vaccine (1) 09/01/2023    COVID-19 Vaccine (4 - 2023-24 season) 09/01/2023       Health Maintenance Due   Topic Date Due    MICROALBUMIN  Never done    TSH W/FREE T4 REFLEX  Never done    DIABETIC FOOT EXAM  Never done    EYE EXAM  Never done    COLORECTAL CANCER SCREENING  Never done    HIV SCREENING  Never done    Pneumococcal Vaccine: Pediatrics (0 to 5 Years) and At-Risk Patients (6 to 64 Years) (2 of 2 - PPSV23 or PCV20) 11/28/2009    A1C  07/02/2019    BMP  02/01/2020    LIPID  02/01/2020    YEARLY PREVENTIVE VISIT  07/26/2022    ANNUAL REVIEW OF HM ORDERS  07/26/2022    INFLUENZA VACCINE (1) 09/01/2023    COVID-19 Vaccine (4 - 2023-24 season) 09/01/2023    PHQ-2 (once per calendar year)  01/01/2024       Type of outreach:    Sent [employer] message. and routed to the [facility] pool to assist the patient in scheduling an appointment for his yearly preventative visit. Updated the patient's immunization record to reflect [facility].     Per [facility]:   Influenza 09/16/2023 Booster Flucelvax quad pfree Full   No     - PAL RN Left  requesting call back to further discuss and schedule appt, awaiting call back at this time.  - PAL RN sent [employer] message, advised to schedule appointment with provider    Next Steps:  Max number of attempts reached: Yes. Will try again in 90 days if patient still on fail list.                                                                                     Chart routed to [place] pool to please assist the patient in scheduling his yearly preventative visit.    Ann CHIANG RN   Patient Advocate Liaison  (PAL)  [facility]   [phone]

## 2024-02-11 ENCOUNTER — HEALTH MAINTENANCE LETTER (OUTPATIENT)
Age: 49
End: 2024-02-11

## 2024-08-24 ENCOUNTER — TRANSFERRED RECORDS (OUTPATIENT)
Dept: HEALTH INFORMATION MANAGEMENT | Facility: CLINIC | Age: 49
End: 2024-08-24

## 2024-08-24 LAB — RETINOPATHY: POSITIVE

## 2024-09-08 ENCOUNTER — HEALTH MAINTENANCE LETTER (OUTPATIENT)
Age: 49
End: 2024-09-08

## 2024-11-17 ENCOUNTER — HEALTH MAINTENANCE LETTER (OUTPATIENT)
Age: 49
End: 2024-11-17

## 2025-02-07 NOTE — PLAN OF CARE
Cystoscopy     Date/Time  2/7/2025 8:00 AM     Performed by  Florian Mills MD   Authorized by  Florian Mills MD     Universal Protocol:  Consent: Verbal consent obtained. Written consent obtained.  Risks and benefits: risks, benefits and alternatives were discussed  Consent given by: patient  Patient understanding: patient states understanding of the procedure being performed  Patient consent: the patient's understanding of the procedure matches consent given  Procedure consent: procedure consent matches procedure scheduled  Relevant documents: relevant documents present and verified  Patient identity confirmed: verbally with patient      Procedure Details:  Procedure type: simple removal of a foreign body, stone, or stent    Patient tolerance: Patient tolerated the procedure well with no immediate complications    Additional Procedure Details: Patient status post right ureteroscopy and laser lithotripsy with stent placement.  Here for stent removal.  Patient was placed in supine position.  He was identified.  Consent obtained.  Penis was prepped and draped in usual sterile fashion using Betadine.  Lidocaine jelly instilled per urethra.  Flexible cystoscopy then performed.  Urethra is normal prostate is normal and nonobstructing.  Scope advanced into the bladder and stent seen emanating from right ureteral orifice.  Mild inflammatory changes around orifice as expected.  No bleeding.  The stent was then grasped using a grasping forcep and the stent grasper and scope were then all removed from the patient in their entirety.  The stent was inspected and was intact.  Patient tolerated procedure well.  He received a single dose of antibiotic p.o. and was discharged from the office in satisfactory condition in the accompaniment of his wife.  We plan to see him back in 4 to 6 weeks to review stone prevention.  His questions were answered.  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  For vital signs and complete assessments, please see documentation flowsheets.     Pertinent assessments: AOx4. Denies pain. TMAX 100.4. Tele SR/ST. BP wnl. Lungs diminished. Dry hacking cough. 2L NC with sleep. Pt reports he uses CPAP at home at night. Nasuea with zofran x1. Voiding in urinal.   Major Shift Events: K replaced.   Plan (Upcoming Events): Follow electrolytes and replace as needed. Continue to follow blood sugars/labs  Discharge/Transfer Needs: Home    Bedside Shift Report Completed : Y  Bedside Safety Check Completed: Y   long as he is doing well.

## 2025-02-24 ENCOUNTER — OFFICE VISIT (OUTPATIENT)
Dept: PEDIATRICS | Facility: CLINIC | Age: 50
End: 2025-02-24
Payer: COMMERCIAL

## 2025-02-24 VITALS
WEIGHT: 315 LBS | BODY MASS INDEX: 44.1 KG/M2 | DIASTOLIC BLOOD PRESSURE: 80 MMHG | HEART RATE: 80 BPM | SYSTOLIC BLOOD PRESSURE: 130 MMHG | OXYGEN SATURATION: 97 % | HEIGHT: 71 IN | RESPIRATION RATE: 20 BRPM | TEMPERATURE: 98.6 F

## 2025-02-24 DIAGNOSIS — E66.813 CLASS 3 SEVERE OBESITY WITH SERIOUS COMORBIDITY AND BODY MASS INDEX (BMI) OF 45.0 TO 49.9 IN ADULT, UNSPECIFIED OBESITY TYPE (H): ICD-10-CM

## 2025-02-24 DIAGNOSIS — E03.9 HYPOTHYROIDISM, UNSPECIFIED TYPE: ICD-10-CM

## 2025-02-24 DIAGNOSIS — G47.33 OSA (OBSTRUCTIVE SLEEP APNEA): ICD-10-CM

## 2025-02-24 DIAGNOSIS — E66.01 CLASS 3 SEVERE OBESITY WITH SERIOUS COMORBIDITY AND BODY MASS INDEX (BMI) OF 45.0 TO 49.9 IN ADULT, UNSPECIFIED OBESITY TYPE (H): ICD-10-CM

## 2025-02-24 DIAGNOSIS — Z00.00 ROUTINE GENERAL MEDICAL EXAMINATION AT A HEALTH CARE FACILITY: Primary | ICD-10-CM

## 2025-02-24 DIAGNOSIS — Z12.11 SCREEN FOR COLON CANCER: ICD-10-CM

## 2025-02-24 DIAGNOSIS — Z11.4 SCREENING FOR HIV (HUMAN IMMUNODEFICIENCY VIRUS): ICD-10-CM

## 2025-02-24 DIAGNOSIS — E10.65 TYPE 1 DIABETES MELLITUS WITH HYPERGLYCEMIA (H): ICD-10-CM

## 2025-02-24 LAB
ALBUMIN SERPL BCG-MCNC: 4.4 G/DL (ref 3.5–5.2)
ALP SERPL-CCNC: 85 U/L (ref 40–150)
ALT SERPL W P-5'-P-CCNC: 36 U/L (ref 0–70)
ANION GAP SERPL CALCULATED.3IONS-SCNC: 13 MMOL/L (ref 7–15)
AST SERPL W P-5'-P-CCNC: 32 U/L (ref 0–45)
BILIRUB SERPL-MCNC: 0.5 MG/DL
BUN SERPL-MCNC: 12 MG/DL (ref 6–20)
CALCIUM SERPL-MCNC: 9 MG/DL (ref 8.8–10.4)
CHLORIDE SERPL-SCNC: 104 MMOL/L (ref 98–107)
CHOLEST SERPL-MCNC: 121 MG/DL
CREAT SERPL-MCNC: 0.94 MG/DL (ref 0.67–1.17)
CREAT UR-MCNC: 71.9 MG/DL
EGFRCR SERPLBLD CKD-EPI 2021: >90 ML/MIN/1.73M2
EST. AVERAGE GLUCOSE BLD GHB EST-MCNC: 197 MG/DL
FASTING STATUS PATIENT QL REPORTED: ABNORMAL
FASTING STATUS PATIENT QL REPORTED: ABNORMAL
GLUCOSE SERPL-MCNC: 169 MG/DL (ref 70–99)
HBA1C MFR BLD: 8.5 % (ref 0–5.6)
HCO3 SERPL-SCNC: 23 MMOL/L (ref 22–29)
HDLC SERPL-MCNC: 40 MG/DL
HIV 1+2 AB+HIV1 P24 AG SERPL QL IA: NONREACTIVE
LDLC SERPL CALC-MCNC: 41 MG/DL
MICROALBUMIN UR-MCNC: <12 MG/L
MICROALBUMIN/CREAT UR: NORMAL MG/G{CREAT}
NONHDLC SERPL-MCNC: 81 MG/DL
POTASSIUM SERPL-SCNC: 4.2 MMOL/L (ref 3.4–5.3)
PROT SERPL-MCNC: 6.8 G/DL (ref 6.4–8.3)
SODIUM SERPL-SCNC: 140 MMOL/L (ref 135–145)
TRIGL SERPL-MCNC: 201 MG/DL
TSH SERPL DL<=0.005 MIU/L-ACNC: 1.88 UIU/ML (ref 0.3–4.2)

## 2025-02-24 PROCEDURE — 84443 ASSAY THYROID STIM HORMONE: CPT | Performed by: STUDENT IN AN ORGANIZED HEALTH CARE EDUCATION/TRAINING PROGRAM

## 2025-02-24 PROCEDURE — 82043 UR ALBUMIN QUANTITATIVE: CPT | Performed by: STUDENT IN AN ORGANIZED HEALTH CARE EDUCATION/TRAINING PROGRAM

## 2025-02-24 PROCEDURE — 99386 PREV VISIT NEW AGE 40-64: CPT | Performed by: STUDENT IN AN ORGANIZED HEALTH CARE EDUCATION/TRAINING PROGRAM

## 2025-02-24 PROCEDURE — 83036 HEMOGLOBIN GLYCOSYLATED A1C: CPT | Performed by: STUDENT IN AN ORGANIZED HEALTH CARE EDUCATION/TRAINING PROGRAM

## 2025-02-24 PROCEDURE — 36415 COLL VENOUS BLD VENIPUNCTURE: CPT | Performed by: STUDENT IN AN ORGANIZED HEALTH CARE EDUCATION/TRAINING PROGRAM

## 2025-02-24 PROCEDURE — 82570 ASSAY OF URINE CREATININE: CPT | Performed by: STUDENT IN AN ORGANIZED HEALTH CARE EDUCATION/TRAINING PROGRAM

## 2025-02-24 PROCEDURE — 80053 COMPREHEN METABOLIC PANEL: CPT | Performed by: STUDENT IN AN ORGANIZED HEALTH CARE EDUCATION/TRAINING PROGRAM

## 2025-02-24 PROCEDURE — 80061 LIPID PANEL: CPT | Performed by: STUDENT IN AN ORGANIZED HEALTH CARE EDUCATION/TRAINING PROGRAM

## 2025-02-24 PROCEDURE — 87389 HIV-1 AG W/HIV-1&-2 AB AG IA: CPT | Performed by: STUDENT IN AN ORGANIZED HEALTH CARE EDUCATION/TRAINING PROGRAM

## 2025-02-24 RX ORDER — TOPIRAMATE 100 MG/1
1 TABLET, FILM COATED ORAL
COMMUNITY
Start: 2024-03-15

## 2025-02-24 RX ORDER — LOSARTAN POTASSIUM 100 MG/1
1 TABLET ORAL
COMMUNITY
Start: 2024-12-18

## 2025-02-24 RX ORDER — PHENTERMINE HYDROCHLORIDE 37.5 MG/1
TABLET ORAL
Status: CANCELLED | OUTPATIENT
Start: 2025-02-24

## 2025-02-24 RX ORDER — TOPIRAMATE 25 MG/1
25 TABLET, FILM COATED ORAL 2 TIMES DAILY
Status: CANCELLED | OUTPATIENT
Start: 2025-02-24

## 2025-02-24 RX ORDER — EMPAGLIFLOZIN 25 MG/1
1 TABLET, FILM COATED ORAL
COMMUNITY
Start: 2024-12-18

## 2025-02-24 RX ORDER — LEVOTHYROXINE SODIUM 200 UG/1
1 TABLET ORAL
COMMUNITY
Start: 2024-12-18

## 2025-02-24 SDOH — HEALTH STABILITY: PHYSICAL HEALTH: ON AVERAGE, HOW MANY MINUTES DO YOU ENGAGE IN EXERCISE AT THIS LEVEL?: 0 MIN

## 2025-02-24 SDOH — HEALTH STABILITY: PHYSICAL HEALTH: ON AVERAGE, HOW MANY DAYS PER WEEK DO YOU ENGAGE IN MODERATE TO STRENUOUS EXERCISE (LIKE A BRISK WALK)?: 3 DAYS

## 2025-02-24 ASSESSMENT — SOCIAL DETERMINANTS OF HEALTH (SDOH)
HOW OFTEN DO YOU GET TOGETHER WITH FRIENDS OR RELATIVES?: ONCE A WEEK
HOW OFTEN DO YOU GET TOGETHER WITH FRIENDS OR RELATIVES?: ONCE A WEEK

## 2025-02-24 ASSESSMENT — PAIN SCALES - GENERAL: PAINLEVEL_OUTOF10: NO PAIN (0)

## 2025-02-24 NOTE — PROGRESS NOTES
"Preventive Care Visit  [facility] Gaston MD, Internal Medicine  Feb 24, 2025      Assessment & Plan     Routine general medical examination at a health care facility  Presents today for routine visit with additional concerns as below     Class 3 severe obesity with serious comorbidity and body mass index (BMI) of 45.0 to 49.9 in adult, unspecified obesity type (H)  Notes that he has been working on weight loss with his endocrinologist, however is now hoping to transfer care to [place].  Will refer to endo and MTM.  - Comprehensive metabolic panel (BMP + Alb, Alk Phos, ALT, AST, Total. Bili, TP); Future  - Med Therapy Management Referral  - Comprehensive metabolic panel (BMP + Alb, Alk Phos, ALT, AST, Total. Bili, TP)    Type 1 diabetes mellitus with hyperglycemia (H)  History of type 1 diabetes diagnosed in hi 20s.  Continues to follow with endo but hoping to [place] care to [place]   - Lipid panel reflex to direct LDL Fasting; Future  - Albumin Random Urine Quantitative with Creat Ratio; Future  - HEMOGLOBIN A1C; Future  - Adult Endocrinology  Referral; Future  - Lipid panel reflex to direct LDL Fasting  - Albumin Random Urine Quantitative with Creat Ratio  - HEMOGLOBIN A1C    Hypothyroidism, unspecified type  On synthroid.  Managed by endo   - TSH WITH FREE T4 REFLEX; Future  - TSH WITH FREE T4 REFLEX    Screen for colon cancer  Due.  Discussed cologuard vs colonoscopy and would like to proceed with colonoscopy  - Colonoscopy Screening  Referral; Future    Screening for HIV (human immunodeficiency virus)  No additional risk factors, but will complete with screening   - HIV Antigen Antibody Combo; Future  - HIV Antigen Antibody Combo            BMI  Estimated body mass index is 48.88 kg/m  as calculated from the following:    Height as of this encounter: 1.794 m (5' 10.63\").    Weight as of this encounter: 157.3 kg (346 lb 12.8 oz).       Counseling  Appropriate " preventive services were addressed with this patient via screening, questionnaire, or discussion as appropriate for fall prevention, nutrition, physical activity, Tobacco-use cessation, social engagement, weight loss and cognition.  Checklist reviewing preventive services available has been given to the patient.  Reviewed patient's diet, addressing concerns and/or questions.   He is at risk for lack of exercise and has been provided with information to increase physical activity for the benefit of his well-being.   The patient was instructed to see the dentist every 6 months.           Jasmin Méndez is a 49 year old, presenting for the following:  Physical        2/24/2025     9:16 AM   Additional Questions   Roomed by Anaid REYES   Accompanied by Self         2/24/2025     9:16 AM   Patient Reported Additional Medications   Patient reports taking the following new medications No          Healthy Habits:     Getting at least 3 servings of Calcium per day:  Yes    Bi-annual eye exam:  Yes    Dental care twice a year:  NO    Sleep apnea or symptoms of sleep apnea:  Sleep apnea    Diet:  Regular (no restrictions)    Frequency of exercise:  2-3 days/week    Duration of exercise:  15-30 minutes    Taking medications regularly:  No    Barriers to taking medications:  Other (phamacy issues)    Medication side effects:  None    Additional concerns today:  Yes (getting pharmacy medications on board (ask patient), weight loss and gain)    Follows with [facility]- prescription for topiratma also ran out in December  Phentermine fell off- hasn't taken for at least 6 months     Feels that is is harder to get refills and is interested in transferring care to [facility] Care Directive  Patient does not have a Health Care Directive: Discussed advance care planning with patient; however, patient declined at this time.      2/24/2025   General Health   How would you rate your overall physical health? (!) FAIR   Feel  stress (tense, anxious, or unable to sleep) Not at all         2/24/2025   Nutrition   Three or more servings of calcium each day? Yes   Diet: Regular (no restrictions)   How many servings of fruit and vegetables per day? (!) 0-1   How many sweetened beverages each day? 0-1         2/24/2025   Exercise   Days per week of moderate/strenous exercise 3 days   Average minutes spent exercising at this level 0 min         2/24/2025   Social Factors   Frequency of gathering with friends or relatives Once a week   Worry food won't last until get money to buy more No   Food not last or not have enough money for food? No   Do you have housing? (Housing is defined as stable permanent housing and does not include staying ouside in a car, in a tent, in an abandoned building, in an overnight shelter, or couch-surfing.) Yes   Are you worried about losing your housing? No   Lack of transportation? No   Unable to get utilities (heat,electricity)? No         2/24/2025   Dental   Dentist two times every year? (!) NO            Today's PHQ-2 Score:       2/24/2025     9:08 AM   PHQ-2 ( 1999 [employer])   Q1: Little interest or pleasure in doing things 0   Q2: Feeling down, depressed or hopeless 0   PHQ-2 Score 0    Q1: Little interest or pleasure in doing things Not at all   Q2: Feeling down, depressed or hopeless Not at all   PHQ-2 Score 0       Patient-reported           2/24/2025   Substance Use   Alcohol more than 3/day or more than 7/wk No   Do you use any other substances recreationally? No     Social History     Tobacco Use    Smoking status: Never     Passive exposure: Never    Smokeless tobacco: Never   Vaping Use    Vaping status: Never Used   Substance Use Topics    Alcohol use: Yes     Comment: once a week    Drug use: No             2/24/2025   One time HIV Screening   Previous HIV test? No         2/24/2025   STI Screening   New sexual partner(s) since last STI/HIV test? No   ASCVD Risk   The ASCVD Risk score (Juventino  "ARABELLA, et al., 2019) failed to calculate for the following reasons:    Cannot find a previous HDL lab    Cannot find a previous total cholesterol lab    The BP treatment status is invalid        2/24/2025   Contraception/Family Planning   Questions about contraception or family planning No        Reviewed and updated as needed this visit by Provider                             Objective    Exam  /80 (BP Location: Right arm, Patient Position: Sitting, Cuff Size: Adult Large)   Pulse 80   Temp 98.6  F (37  C) (Oral)   Resp 20   Ht 1.794 m (5' 10.63\")   Wt (!) 157.3 kg (346 lb 12.8 oz)   SpO2 97%   BMI 48.88 kg/m     Estimated body mass index is 48.88 kg/m  as calculated from the following:    Height as of this encounter: 1.794 m (5' 10.63\").    Weight as of this encounter: 157.3 kg (346 lb 12.8 oz).    Physical Exam  GENERAL: alert and no distress  EYES: Eyes grossly normal to inspection, PERRL and conjunctivae and sclerae normal  HENT: ear canals and TM's normal, nose and mouth without ulcers or lesions  NECK: no adenopathy, no asymmetry, masses, or scars  RESP: lungs clear to auscultation - no rales, rhonchi or wheezes  CV: regular rate and rhythm, normal S1 S2, no S3 or S4, no murmur, click or rub, no peripheral edema  ABDOMEN: soft, nontender, no hepatosplenomegaly, no masses  MS: no gross musculoskeletal defects noted, no edema  SKIN: no suspicious lesions or rashes  NEURO: Normal strength and tone, mentation intact and speech normal  PSYCH: mentation appears normal, affect normal/bright  Diabetic foot exam: normal DP and PT pulses, no trophic changes or ulcerative lesions, and normal sensory exam        Signed Electronically by: Emy Gaston MD    "

## 2025-02-24 NOTE — PATIENT INSTRUCTIONS
Patient Education   Preventive Care Advice   This is general advice given by our system to help you stay healthy. However, your care team may have specific advice just for you. Please talk to your care team about your preventive care needs.  Nutrition  Eat 5 or more servings of fruits and vegetables each day.  Try wheat bread, brown rice and whole grain pasta (instead of white bread, rice, and pasta).  Get enough calcium and vitamin D. Check the label on foods and aim for 100% of the RDA (recommended daily allowance).  Lifestyle  Exercise at least 150 minutes each week  (30 minutes a day, 5 days a week).  Do muscle strengthening activities 2 days a week. These help control your weight and prevent disease.  No smoking.  Wear sunscreen to prevent skin cancer.  Have a dental exam and cleaning every 6 months.  Yearly exams  See your health care team every year to talk about:  Any changes in your health.  Any medicines your care team has prescribed.  Preventive care, family planning, and ways to prevent chronic diseases.  Shots (vaccines)   HPV shots (up to age 26), if you've never had them before.  Hepatitis B shots (up to age 59), if you've never had them before.  COVID-19 shot: Get this shot when it's due.  Flu shot: Get a flu shot every year.  Tetanus shot: Get a tetanus shot every 10 years.  Pneumococcal, hepatitis A, and RSV shots: Ask your care team if you need these based on your risk.  Shingles shot (for age 50 and up)  General health tests  Diabetes screening:  Starting at age 35, Get screened for diabetes at least every 3 years.  If you are younger than age 35, ask your care team if you should be screened for diabetes.  Cholesterol test: At age 39, start having a cholesterol test every 5 years, or more often if advised.  Bone density scan (DEXA): At age 50, ask your care team if you should have this scan for osteoporosis (brittle bones).  Hepatitis C: Get tested at least once in your life.  STIs (sexually  transmitted infections)  Before age 24: Ask your care team if you should be screened for STIs.  After age 24: Get screened for STIs if you're at risk. You are at risk for STIs (including HIV) if:  You are sexually active with more than one person.  You don't use condoms every time.  You or a partner was diagnosed with a sexually transmitted infection.  If you are at risk for HIV, ask about PrEP medicine to prevent HIV.  Get tested for HIV at least once in your life, whether you are at risk for HIV or not.  Cancer screening tests  Cervical cancer screening: If you have a cervix, begin getting regular cervical cancer screening tests starting at age 21.  Breast cancer scan (mammogram): If you've ever had breasts, begin having regular mammograms starting at age 40. This is a scan to check for breast cancer.  Colon cancer screening: It is important to start screening for colon cancer at age 45.  Have a colonoscopy test every 10 years (or more often if you're at risk) Or, ask your provider about stool tests like a FIT test every year or Cologuard test every 3 years.  To learn more about your testing options, visit:   .  For help making a decision, visit:   https://bit.ly/dg08434.  Prostate cancer screening test: If you have a prostate, ask your care team if a prostate cancer screening test (PSA) at age 55 is right for you.  Lung cancer screening: If you are a current or former smoker ages 50 to 80, ask your care team if ongoing lung cancer screenings are right for you.  For informational purposes only. Not to replace the advice of your health care provider. Copyright   2023 [employer]. All rights reserved. Clinically reviewed by the [facility]itions [facility]. [facility] [id number] - REV 01/24.

## 2025-02-26 ENCOUNTER — TELEPHONE (OUTPATIENT)
Dept: PEDIATRICS | Facility: CLINIC | Age: 50
End: 2025-02-26
Payer: COMMERCIAL

## 2025-02-26 NOTE — TELEPHONE ENCOUNTER
MTM referral from: [place] clinic visit (referral by provider)    MTM referral outreach attempt #2 on February 26, 2025 at 10:36 AM      Outcome: Left Message    Use Hp pathfinder for the carrier/Plan on the flowsheet      Applifier Message Sent    Alesha Pettit CMA  MTM

## 2025-03-26 ENCOUNTER — TELEPHONE (OUTPATIENT)
Dept: ENDOCRINOLOGY | Facility: CLINIC | Age: 50
End: 2025-03-26
Payer: COMMERCIAL

## 2025-03-26 NOTE — TELEPHONE ENCOUNTER
Left Voicemail (1st Attempt) and Sent Mychart (1st Attempt) for the patient to call back and schedule the following:    Appointment type: NEW DIABETES  Provider: Pauly Fisher PA-C or anyone who sees new diabetes pt's  Return date: next available, ATUL SOMMER  Specialty phone number: [phone]  Additional appointment(s) needed: N/A  Additonal Notes: LVM, [email or web address]    Please help schedule New Diabetes spot or   ATUL within 2 months   Thank you.   Janeen Queen RN on 3/24/25 at 12:21 PM     Available appts:  [place] 06/13 in person [facility]  [place] 06/17 virtual [facility]  [place] 06/24 virtual [facility]  [place] 06/27 in person [facility]  [place] 07/01 in person [facility] Fisher 08/08 in person [facility]    Please note that the above appointment(s) will require manual scheduling as they are marked as ATUL and will not appear using auto search. Do not schedule the patient if another patient has already been scheduled in the requested appointment slot.     Francine Newman on 3/26/2025 at 2:43 PM

## 2025-03-27 NOTE — CONFIDENTIAL NOTE
RECORDS RECEIVED FROM: internal    DATE RECEIVED:  6.13.25    NOTES (FOR ALL VISITS) STATUS DETAILS   OFFICE NOTES from referring provider internal    Emy Gaston MD      MEDICATION LIST internal     LABS     DIABETES: HBGA1C, CREATININE, FASTING LIPIDS, MICROALBUMIN URINE, POTASSIUM, TSH, T4    THYROID: TSH, T4, CBC, THYRODLONULIN, TOTAL T3, FREE T4, CALCITONIN, CEA internal  CMP- 2.24.25   HBGA1C- 2.24.25   TSH/T4- 2.24.25   Lipid- 2.24.25

## 2025-04-04 NOTE — PROGRESS NOTES
Problem: PHYSICAL THERAPY ADULT  Goal: Performs mobility at highest level of function for planned discharge setting.  See evaluation for individualized goals.  Description: Treatment/Interventions: LE strengthening/ROM, Functional transfer training, Elevations, Therapeutic exercise, Cognitive reorientation, Endurance training, Bed mobility, Equipment eval/education, Patient/family training, Gait training, Spoke to nursing, Spoke to case management, OT, Continued evaluation, Compensatory technique education, Family          See flowsheet documentation for full assessment, interventions and recommendations.  Outcome: Progressing  Note: Prognosis: Fair  Problem List: Decreased endurance, Impaired balance, Decreased mobility, Decreased coordination, Decreased cognition, Impaired judgement, Decreased safety awareness  Assessment: Pt pleasant and agreeable to participate in PT session. Completes mobility and therapeutic activity as outlined above. Pt requires S for transfers throughout session with CGA for safety during ambulation. Pt able to increase ambulation distance compared to previous session. Pt functionally progressing however limited by cognition requiring constant cueing and redirection to task. Pt unable to brush his teeth with verbal or visual cueing, attempting to shave instead. Pt will continue to benefit from skilled acute PT services to address deficits and promote mobility. Pt left upright in chair with chair alarm donned, call bell and personal items within reach and all needs met.  Barriers to Discharge: Inaccessible home environment, Decreased caregiver support     Rehab Resource Intensity Level, PT: II (Moderate Resource Intensity)    See flowsheet documentation for full assessment.         ----- Message from YAYA Machado sent at 3/26/2019  9:14 AM EDT -----  Please notify patient that CT of abdomen and pelvis shows bilateral nonobstructing kidney stones.  No evidence of inflammation in her colon.   [facility]    Observation Unit  Hospitalist Progress Note  Name: Karthik Lopez    MRN: [id number]  Provider:  Abigail Berger PA-C  Date of Service: 01/29/2018    Assessment & Plan   Summary of Stay: Mr. Lopez is a 42-year-old gentleman who has a past medical history of type 1 diabetes, hypothyroidism and obstructive sleep apnea, compliant with CPAP, who presented to this facility with nausea and vomiting.     1. Nausea, vomiting: acute onset of nausea with multiple episodes of vomiting after eating at [employer] yesterday evening, symptoms may be related to food poisoning or a viral syndrome with associated nasal congestion, myalgias, and increased fatigue. No episodes of diarrhea but stool sample was collected and sent to test an enteric panel. No episodes of emesis since this morning around 6:00 AM. Patient is currently tolerating clears. Would prefer the patient to be tolerating fulls if not small amounts of bland regular diet prior to discharge. Will continue supportive care and if tolerating adequate PO intake patient would be able to discharge later today.   - IVF hydration with NS at 100 cc/hour, saline lock once PO well tolerated  - Supportive care with antiemetics  - Monitor I&Os  - Advance diet as tolerated    - Await enteric stool panel results     2. Lactic acidosis: initially lactic acid of 4.4 with improvement down to 1.6 on repeat this morning after IVFs.     3. Hypokalemia: resolved with potassium of 3.2 with improvement to 4.2 after supplementation.     4. Type 1 diabetes: blood glucose levels stable, initially received D5 NS with KCl due to suspicion for component of starvation contributing to his lactic acidosis earlier. HgbA1C of 8.8 this admission. Home regimen includes Lantus 60 units in AM, Invokana 300 mg every AM, and Humalog QID with meals (1 unit per 15 grams of carbohydrates).   - Give 30 units of Lantus now that patient is tolerating PO intake  - Switch IVFs to NS at  100 cc/hour, saline lock once PO well tolerated  - Hold Invokana   - SSI ordered   - Blood glucose checks QID and at 2 AM     5. CHAZ: uses CPAP at home that he did not bring with him. PRN supplemental oxygen while sleeping.      6. Hypothyroidism: continue Levothyroxine     DVT Prophylaxis: Ambulate every shift  Code Status: Full Code  Disposition: Expected discharge later today or tomorrow     Interval History   Patient reports resolution of nausea since this morning and no episodes of emesis since 6:00 AM. He notes nasal congestion and myalgias. Denies F/C, chest pain, shortness of breath, abdominal pain, or diarrhea. He has tolerated jello, apple juice, and water so far. He reports wanting to discharge later today if possible.     -Data reviewed today: I reviewed all new labs and imaging reports over the last 24 hours. I personally reviewed all labs and imaging from this visit.      Physical Exam   Temp: 99.6  F (37.6  C) Temp src: Oral BP: 94/55 Pulse: 87   Resp: 24 SpO2: 96 % O2 Device: None (Room air)    Vitals:    01/28/18 2204 01/29/18 0122   Weight: 132.5 kg (292 lb) 131.1 kg (289 lb)     Vital Signs with Ranges  Temp:  [97.1  F (36.2  C)-99.6  F (37.6  C)] 99.6  F (37.6  C)  Pulse:  [] 87  Resp:  [18-28] 24  BP: ()/(54-98) 94/55  SpO2:  [93 %-100 %] 96 %  I/O last 3 completed shifts:  In: 30 [P.O.:30]  Out: -     GEN:  Alert, oriented x 3, appears comfortable, NAD.  HEENT:  Normocephalic/atraumatic, no scleral icterus, no nasal discharge but appears congested, mouth moist.  CV:  Regular rate and rhythm, no murmur or JVD.  S1 + S2 noted, no S3 or S4.  LUNGS:  Clear to auscultation bilaterally without rales/rhonchi/wheezing/retractions.  Symmetric chest rise on inhalation noted.  ABD:  Active bowel sounds, soft, non-tender/non-distended.  No rebound/guarding/rigidity.  EXT:  No edema.  No cyanosis.  No acute joint synovitis noted.  SKIN:  Dry to touch, no exanthems noted in the visualized  areas.    Medications     NaCl         insulin aspart  1-7 Units Subcutaneous TID AC     insulin aspart  1-5 Units Subcutaneous At Bedtime     insulin glargine  30 Units Subcutaneous Once     [START ON 1/30/2018] levothyroxine  137 mcg Oral QAM AC     Data   Results for orders placed or performed during the hospital encounter of 01/28/18   Glucose by meter   Result Value Ref Range    Glucose 84 70 - 99 mg/dL   CBC + differential   Result Value Ref Range    WBC 16.6 (H) 4.0 - 11.0 10e9/L    RBC Count 6.20 (H) 4.4 - 5.9 10e12/L    Hemoglobin 17.9 (H) 13.3 - 17.7 g/dL    Hematocrit 52.3 40.0 - 53.0 %    MCV 84 78 - 100 fl    MCH 28.9 26.5 - 33.0 pg    MCHC 34.2 31.5 - 36.5 g/dL    RDW 12.6 10.0 - 15.0 %    Platelet Count 296 150 - 450 10e9/L    Diff Method Automated Method     % Neutrophils 85.6 %    % Lymphocytes 6.1 %    % Monocytes 6.6 %    % Eosinophils 0.9 %    % Basophils 0.4 %    % Immature Granulocytes 0.4 %    Nucleated RBCs 0 0 /100    Absolute Neutrophil 14.2 (H) 1.6 - 8.3 10e9/L    Absolute Lymphocytes 1.0 0.8 - 5.3 10e9/L    Absolute Monocytes 1.1 0.0 - 1.3 10e9/L    Absolute Eosinophils 0.2 0.0 - 0.7 10e9/L    Absolute Basophils 0.1 0.0 - 0.2 10e9/L    Abs Immature Granulocytes 0.1 0 - 0.4 10e9/L    Absolute Nucleated RBC 0.0    Basic metabolic panel (BMP)   Result Value Ref Range    Sodium 140 133 - 144 mmol/L    Potassium 3.2 (L) 3.4 - 5.3 mmol/L    Chloride 105 94 - 109 mmol/L    Carbon Dioxide 23 20 - 32 mmol/L    Anion Gap 12 3 - 14 mmol/L    Glucose 88 70 - 99 mg/dL    Urea Nitrogen 19 7 - 30 mg/dL    Creatinine 0.86 0.66 - 1.25 mg/dL    GFR Estimate >90 >60 mL/min/1.7m2    GFR Estimate If Black >90 >60 mL/min/1.7m2    Calcium 8.4 (L) 8.5 - 10.1 mg/dL   Lactic acid whole blood   Result Value Ref Range    Lactic Acid 4.4 (HH) 0.7 - 2.0 mmol/L   UA with Microscopic   Result Value Ref Range    Color Urine Yellow     Appearance Urine Clear     Glucose Urine >499 (A) NEG^Negative mg/dL    Bilirubin Urine  Negative NEG^Negative    Ketones Urine 5 (A) NEG^Negative mg/dL    Specific Gravity Urine 1.030 1.003 - 1.035    Blood Urine Negative NEG^Negative    pH Urine 6.0 5.0 - 7.0 pH    Protein Albumin Urine Negative NEG^Negative mg/dL    Urobilinogen mg/dL 2.0 0.0 - 2.0 mg/dL    Nitrite Urine Negative NEG^Negative    Leukocyte Esterase Urine Negative NEG^Negative    Source Unspecified Urine     WBC Urine <1 0 - 2 /HPF    RBC Urine 0 0 - 2 /HPF    Squamous Epithelial /HPF Urine <1 0 - 1 /HPF    Mucous Urine Present (A) NEG^Negative /LPF   Hepatic panel   Result Value Ref Range    Bilirubin Direct 0.1 0.0 - 0.2 mg/dL    Bilirubin Total 0.5 0.2 - 1.3 mg/dL    Albumin 4.1 3.4 - 5.0 g/dL    Protein Total 7.4 6.8 - 8.8 g/dL    Alkaline Phosphatase 79 40 - 150 U/L    ALT 26 0 - 70 U/L    AST 21 0 - 45 U/L   Lipase   Result Value Ref Range    Lipase 104 73 - 393 U/L   Hemoglobin A1c   Result Value Ref Range    Hemoglobin A1C 8.8 (H) 4.3 - 6.0 %   Glucose by meter   Result Value Ref Range    Glucose 79 70 - 99 mg/dL   Basic metabolic panel   Result Value Ref Range    Sodium 140 133 - 144 mmol/L    Potassium 4.2 3.4 - 5.3 mmol/L    Chloride 108 94 - 109 mmol/L    Carbon Dioxide 26 20 - 32 mmol/L    Anion Gap 6 3 - 14 mmol/L    Glucose 92 70 - 99 mg/dL    Urea Nitrogen 19 7 - 30 mg/dL    Creatinine 0.82 0.66 - 1.25 mg/dL    GFR Estimate >90 >60 mL/min/1.7m2    GFR Estimate If Black >90 >60 mL/min/1.7m2    Calcium 7.3 (L) 8.5 - 10.1 mg/dL   CBC with platelets   Result Value Ref Range    WBC 10.4 4.0 - 11.0 10e9/L    RBC Count 5.05 4.4 - 5.9 10e12/L    Hemoglobin 14.7 13.3 - 17.7 g/dL    Hematocrit 43.4 40.0 - 53.0 %    MCV 86 78 - 100 fl    MCH 29.1 26.5 - 33.0 pg    MCHC 33.9 31.5 - 36.5 g/dL    RDW 12.7 10.0 - 15.0 %    Platelet Count 201 150 - 450 10e9/L   Lactic acid whole blood   Result Value Ref Range    Lactic Acid 1.6 0.7 - 2.0 mmol/L   Glucose by meter   Result Value Ref Range    Glucose 106 (H) 70 - 99 mg/dL   Glucose by  meter   Result Value Ref Range    Glucose 121 (H) 70 - 99 mg/dL   Glucose by meter   Result Value Ref Range    Glucose 175 (H) 70 - 99 mg/dL   EKG 12 lead   Result Value Ref Range    Interpretation ECG Click View Image link to view waveform and result    ISTAT gases lactate june POCT   Result Value Ref Range    Ph Venous 7.33 7.32 - 7.43 pH    PCO2 Venous 45 40 - 50 mm Hg    PO2 Venous 24 (L) 25 - 47 mm Hg    Bicarbonate Venous 24 21 - 28 mmol/L    O2 Sat Venous 38 %    Lactic Acid 3.7 (H) 0.7 - 2.1 mmol/L     Maria M Berger PA-C

## 2025-04-16 NOTE — PROGRESS NOTES
Medication Therapy Management (MTM) Encounter    ASSESSMENT:                            Medication Adherence/Access: No issues identified.    Diabetes , type 1 and Weight management  Last a1c not at goal < 7%.  Microalbumin is at goal < 30.  Continuous glucose monitor not meeting goal, suggest splitting up basal insulin to help with absorption and better efficacy.   I would suggest restarting the GLP/GIP for WM and history of CHAZ.   I will suggest no change with Jardiance and bupropion at this time given tolerating. Unclear how much benefit bupropion is actually providing, future may consider tapering off. Jardiance may be used in type 1 so maybe reasonable to stay on long-term will defer to endocrinologist who patient will establish with.    Hypertension   Blood pressure is at goal < 130/80 mm Hg.      Hyperlipidemia   Meeting LDL goal of < 70, unclear baseline but from chart review had a LDL of 143 so he would be at least meeting 50% reduction.      Hypothyroidism   Last tsh within normal limit but would recommend changing back to taking in the morning on empty stomach.     Supplements   No indication for fish oil at this time. Patient doesn't have any history of elevated triglycerides > 500 or dry eyes/indication for therapy.     Edema  stable    PLAN:                            Restart tirzepatide. Titrate by 2.5 mg. Rx sent wit enough fills until endo appointment.    Split Lantus into twice daily dosing.   Rx refilled along with pen needles.  Patient to outreach if any issues with lows.    May stop fish oil.    Take levothyroxine in the morning on empty stomach, wait at least 30 minutes.    Follow-up: Return if symptoms worsen or fail to improve, for Medication Therapy Management Visit.    SUBJECTIVE/OBJECTIVE:                          Dev Lopez is a 49 year old male seen for an initial visit. He was referred to me from Weight Management.      Reason for visit: getting back on weight loss medications.  Transferring endo care to [facility].    Allergies/ADRs: Reviewed in chart  Past Medical History: Reviewed in chart  Tobacco: He reports that he has never smoked. He has never been exposed to tobacco smoke. He has never used smokeless tobacco.  Alcohol: twice / month at most  Caffeine: diet soda 4 bottles per day usually mtn dew, 1 cup of coffee per day    Medication Adherence/Access:   Denies any missed doses or cost concern.    Diabetes , type 1 and Weight management  Aspirin 81 mg daily for primary prevention  Lantus 70 units in the morning   Humalog up to 120 units max per day, covers based on experience of certain foods and know how high it raises his blood sugars 1:25 correction if over 200.    -  if food 300 pts then covers with 30 units. If he eats something that raised will cover 100 pts over with 10 units.   Bupropion  mg in the morning (increased at least a year ago) for weight loss  Jardiance 25 mg daily (thought started to help with weight loss)    Patient use to follow with [facility] with Dr. Bruno office with Type 1 diabetes. Now would like to try transfer cares here. Referred to [facility], has initial appointment on 6/13/2025.  Off Mounjaro since early November.   He reports often doesn't feel a big difference with medications with exception of phentermine and Mounjaro use in the past.     He feels low symptoms of low blood sugar when below 90 so prefers  mg/dL range.  Current diabetes symptoms: none  Diet/Exercise:   varies but most common meal may be:  Bagel sandwich for breakfast (covers 24-28 units)  Chicken fingers with chips and cookies for lunch (covers 38 units)  Pasta or Mexican dish (rice and beans etc) for dinner (covers 48 units)    Fast food about once a meal. May have two meals per week of other takeout like [employer]. Then the rest is homemade.   He doesn't feel tracking his diet will be manageable.    Previously tried:   - phentermine worked to help  "suppress appetite but difficult to stay on due to delay in refills form being controlled. No side effects that he is aware of.  - topiramate refills ran out so stopped, not sure how well this worked since Mounjaro was also in the mix at the same time. He was up to 100mg. Does not recall any side effects either.  - Mounjaro was dosed up to 15 mg. Denies any side effects other than may had mild nausea    Initial Consult Weight: 345.8 lbs   Also has CHAZ an does use CPAP every night.     Blood sugar monitoring: Continuous Glucose Monitor, Dexcom G6. Time in Range Last 14 Days - 31%, Above (>180mg/dl) 69%, Below (<70mg/dl) 0%   Eye exam is up to date  Foot exam is up to date    Wt Readings from Last 4 Encounters:   04/21/25 (!) 345 lb 12.8 oz (156.9 kg)   02/24/25 (!) 346 lb 12.8 oz (157.3 kg)   08/26/21 330 lb (149.7 kg)   07/26/21 (!) 358 lb 8 oz (162.6 kg)     Estimated body mass index is 48.74 kg/m  as calculated from the following:    Height as of 2/24/25: 5' 10.63\" (1.794 m).    Weight as of this encounter: 345 lb 12.8 oz (156.9 kg).    Hypertension   Losartan 100 mg daily     Patient reports no current medication side effects  Patient does not self-monitor blood pressure.         Hyperlipidemia   Rosuvastatin 20 mg daily     Patient reports no significant myalgias or other side effects.     Hypothyroidism   Levothyroxine 200 mcg daily - use to take first thing and then wait 30 minutes to take other medications but changed to evening time    Patient is having the following symptoms: none.      Supplements   Multivitamin 1 tablet per day   Fish oil started this many years ago just thought it would be helpful for overall health    No reported issues at this time.          Edema  Furosemide 20 mg daily   Potassium 20mEq daily before dinner    Use to have bad swelling in lower legs, but he reports has been better. He does see a vascular specialist as needed. He reports has varicose veins. He does wear compression " stockings.       Today's Vitals: /68   Wt (!) 345 lb 12.8 oz (156.9 kg)   BMI 48.74 kg/m    ----------------      I spent 60 minutes with this patient today. All changes were made via collaborative practice agreement with Jonathan Hillman MD.     A summary of these recommendations was given to the patient.    Gladis Rey, PharmD  Medication Therapy Management Pharmacist       Medication Therapy Recommendations  Encounter for weight management   1 Current Medication: tirzepatide (MOUNJARO) 2.5 MG/0.5ML SOAJ auto-injector pen   Current Medication Sig: Inject 0.5 mLs (2.5 mg) subcutaneously every 7 days.   Rationale: Synergistic therapy - Needs additional medication therapy - Indication   Recommendation: Start Medication   Status: Accepted per CPA   Identified Date: 4/21/2025 Completed Date: 4/21/2025         Hypothyroid   1 Current Medication: levothyroxine (SYNTHROID/LEVOTHROID) 200 MCG tablet   Current Medication Sig: Take 1 tablet by mouth daily at 2 pm.   Rationale: Does not understand instructions - Adherence - Adherence   Recommendation: Change Administration Time   Status: Patient Agreed - Adherence/Education   Identified Date: 4/21/2025 Completed Date: 4/21/2025         Takes dietary supplements   1 Current Medication: Omega-3 Fatty Acids (FISH OIL PO)   Current Medication Sig: Take 1 capsule by mouth daily   Rationale: No medical indication at this time - Unnecessary medication therapy - Indication   Recommendation: Discontinue Medication   Status: Accepted - no CPA Needed   Identified Date: 4/21/2025 Completed Date: 4/21/2025

## 2025-04-21 ENCOUNTER — OFFICE VISIT (OUTPATIENT)
Dept: PHARMACY | Facility: CLINIC | Age: 50
End: 2025-04-21
Attending: STUDENT IN AN ORGANIZED HEALTH CARE EDUCATION/TRAINING PROGRAM
Payer: COMMERCIAL

## 2025-04-21 VITALS — WEIGHT: 315 LBS | BODY MASS INDEX: 48.74 KG/M2 | DIASTOLIC BLOOD PRESSURE: 68 MMHG | SYSTOLIC BLOOD PRESSURE: 126 MMHG

## 2025-04-21 DIAGNOSIS — Z76.89 ENCOUNTER FOR WEIGHT MANAGEMENT: ICD-10-CM

## 2025-04-21 DIAGNOSIS — Z78.9 TAKES DIETARY SUPPLEMENTS: ICD-10-CM

## 2025-04-21 DIAGNOSIS — E03.9 ACQUIRED HYPOTHYROIDISM: ICD-10-CM

## 2025-04-21 DIAGNOSIS — R60.9 EDEMA, UNSPECIFIED TYPE: ICD-10-CM

## 2025-04-21 DIAGNOSIS — E78.5 HYPERLIPIDEMIA LDL GOAL <70: ICD-10-CM

## 2025-04-21 DIAGNOSIS — R03.0 ELEVATED BP WITHOUT DIAGNOSIS OF HYPERTENSION: ICD-10-CM

## 2025-04-21 DIAGNOSIS — E10.65 TYPE 1 DIABETES MELLITUS WITH HYPERGLYCEMIA (H): Primary | ICD-10-CM

## 2025-04-21 PROCEDURE — 3074F SYST BP LT 130 MM HG: CPT | Performed by: PHARMACIST

## 2025-04-21 PROCEDURE — 99607 MTMS BY PHARM ADDL 15 MIN: CPT | Performed by: PHARMACIST

## 2025-04-21 PROCEDURE — 3078F DIAST BP <80 MM HG: CPT | Performed by: PHARMACIST

## 2025-04-21 PROCEDURE — 99605 MTMS BY PHARM NP 15 MIN: CPT | Performed by: PHARMACIST

## 2025-04-21 RX ORDER — BUPROPION HYDROCHLORIDE 300 MG/1
1 TABLET ORAL DAILY
COMMUNITY
Start: 2025-03-12

## 2025-04-21 NOTE — PATIENT INSTRUCTIONS
"Recommendation(s) from today's visit:                                                      You can consider stopping fish oil.     Restart tirzepatide (Mounjaro) 2.5 mg weekly, you can increase by 2.5 mg after 4 weeks if tolerating. Until you follow-up with Endo.    Split the Lantus dose into twice daily so 36 units twice daily moving forward.     Refilled Lantus and pen needles just in case.    Message Gladis if any issues with low blood sugar before you follow-up with Endo.    Ok to stay on Jardiance and bupropion for now given tolerating.     Do not need to start tracking daily but something to think about occasional checking to make sure your have adequate macro nutrients. Consider apps such as Myfitness pal or Lose It!     Follow-up:     My Clinical Pharmacist's contact information:                                                      Gladis Rey, PharmD  Medication therapy management clinical pharmacist    It was great speaking with you today.  I value your experience and would be very thankful for your time in providing feedback in our clinic survey. In the next few days, you may receive an email or text message from [employer] with a link to a survey related to your  clinical pharmacist.\"     To schedule another MTM appointment, please call the clinic directly [phone] or you may call the MTM scheduling line at [phone].    "

## 2025-06-11 NOTE — PROGRESS NOTES
Outcome for 06/11/25 12:50 PM: Mychart message sent  Outcome for 06/12/25 12:54 PM: Left Voicemail   06/12/25 12:57 PM : Appointment reminder phone call made to patient. No Answer. LVM Advising pt to arrive 15 mins early and and to bring glucometer

## 2025-06-13 ENCOUNTER — PRE VISIT (OUTPATIENT)
Dept: ENDOCRINOLOGY | Facility: CLINIC | Age: 50
End: 2025-06-13

## 2025-06-13 ENCOUNTER — OFFICE VISIT (OUTPATIENT)
Dept: ENDOCRINOLOGY | Facility: CLINIC | Age: 50
End: 2025-06-13
Attending: STUDENT IN AN ORGANIZED HEALTH CARE EDUCATION/TRAINING PROGRAM
Payer: COMMERCIAL

## 2025-06-13 VITALS
BODY MASS INDEX: 48.34 KG/M2 | DIASTOLIC BLOOD PRESSURE: 82 MMHG | HEART RATE: 97 BPM | SYSTOLIC BLOOD PRESSURE: 137 MMHG | WEIGHT: 315 LBS | OXYGEN SATURATION: 94 %

## 2025-06-13 DIAGNOSIS — E03.9 HYPOTHYROIDISM, UNSPECIFIED TYPE: ICD-10-CM

## 2025-06-13 DIAGNOSIS — E10.65 TYPE 1 DIABETES MELLITUS WITH HYPERGLYCEMIA (H): Primary | ICD-10-CM

## 2025-06-13 DIAGNOSIS — E88.819 INSULIN RESISTANCE: ICD-10-CM

## 2025-06-13 DIAGNOSIS — E78.5 HYPERLIPIDEMIA LDL GOAL <100: ICD-10-CM

## 2025-06-13 LAB
EST. AVERAGE GLUCOSE BLD GHB EST-MCNC: 186 MG/DL
HBA1C MFR BLD: 8.1 %

## 2025-06-13 PROCEDURE — 3075F SYST BP GE 130 - 139MM HG: CPT | Performed by: STUDENT IN AN ORGANIZED HEALTH CARE EDUCATION/TRAINING PROGRAM

## 2025-06-13 PROCEDURE — 1126F AMNT PAIN NOTED NONE PRSNT: CPT | Performed by: STUDENT IN AN ORGANIZED HEALTH CARE EDUCATION/TRAINING PROGRAM

## 2025-06-13 PROCEDURE — 99205 OFFICE O/P NEW HI 60 MIN: CPT | Mod: 25 | Performed by: STUDENT IN AN ORGANIZED HEALTH CARE EDUCATION/TRAINING PROGRAM

## 2025-06-13 PROCEDURE — 83036 HEMOGLOBIN GLYCOSYLATED A1C: CPT | Performed by: PATHOLOGY

## 2025-06-13 PROCEDURE — 95251 CONT GLUC MNTR ANALYSIS I&R: CPT | Performed by: STUDENT IN AN ORGANIZED HEALTH CARE EDUCATION/TRAINING PROGRAM

## 2025-06-13 PROCEDURE — 3079F DIAST BP 80-89 MM HG: CPT | Performed by: STUDENT IN AN ORGANIZED HEALTH CARE EDUCATION/TRAINING PROGRAM

## 2025-06-13 RX ORDER — PROCHLORPERAZINE 25 MG/1
SUPPOSITORY RECTAL
Qty: 3 EACH | Refills: 5 | Status: SHIPPED | OUTPATIENT
Start: 2025-06-13

## 2025-06-13 RX ORDER — ROSUVASTATIN CALCIUM 20 MG/1
20 TABLET, COATED ORAL DAILY
Qty: 90 TABLET | Refills: 3 | Status: SHIPPED | OUTPATIENT
Start: 2025-06-13

## 2025-06-13 RX ORDER — PROCHLORPERAZINE 25 MG/1
SUPPOSITORY RECTAL
Qty: 1 EACH | Refills: 3 | Status: SHIPPED | OUTPATIENT
Start: 2025-06-13

## 2025-06-13 RX ORDER — LEVOTHYROXINE SODIUM 200 UG/1
200 TABLET ORAL DAILY
Qty: 90 TABLET | Refills: 3 | Status: SHIPPED | OUTPATIENT
Start: 2025-06-13

## 2025-06-13 ASSESSMENT — PAIN SCALES - GENERAL: PAINLEVEL_OUTOF10: NO PAIN (0)

## 2025-06-13 NOTE — NURSING NOTE
"Chief Complaint   Patient presents with    Diabetes     Vital signs:      BP: 137/82 Pulse: 97     SpO2: 94 %       Weight: (!) 155.6 kg (343 lb)  Estimated body mass index is 48.34 kg/m  as calculated from the following:    Height as of 2/24/25: 1.794 m (5' 10.63\").    Weight as of this encounter: 155.6 kg (343 lb).        "

## 2025-06-13 NOTE — PATIENT INSTRUCTIONS
"To stop using Jardiance   To increase Lantus to 78 units   To change the carb coverage for the dinnerr to 1:8 gram carbohydrates     To start using the following correction with meals       Blood sugar reading           adding extra units  <150                                            0   150-200                                      4 units  201-250                                      8 units  251-300                                      12 units  301-350                                      16 units  351-400                                      20 units  >400                                            24  units     To get the 24 hour urine cortisol done :  Brief description of what you need to do:  Do the test on a day off, so you can stay home.  Wake up and flush the first urine.  Then collect all the urine for that day, evening and overnight if you wake to urinate. Plus the first urine of the next morning.    For women,  the lab should also give you a \"hat\" which is a device to help collect the urine and to pour it into the collection jug.   The urine must be kept cool, not frozen.  So, do not put it out on the porch.  Keep it in the fridge.     To get the late night salivary cortisol   LAB PERSONAL :   PLEASE ENTER THE TEST TIME AS THE TIME SAMPLE WAS COLLECTED by the patient---- IF 11 PM TO MIDNIGHT , ENTER AS 11 PM  (to avoid confusion between 12 AM or 12 pm).    --------------------------------  Karthik Lopez  :  PLEASE FOLLOW THESE INSTRUCTIONS FOR COLLECTION OF SALIVA (for salivary cortisol test):  Time of Collection:  11 PM to midnight  (label as 11 PM)--nothing to eat or drink, and no brushing teeth for an hour prior to collection.      Label the EXACT time of saliva collection .      Chew on the cotton swab in the tube for 2  min and place soaked swab back in the tube.                Avoid dairy products containing bovine hormones and acidic or high sugar foods   for a day.                 Do " not collect within 60 minutes after eating a meal, or within 12 hours of    consuming alcohol.                Do not brush or floss teeth just prior to collection.                Rinse mouth thoroughly with water 15 minutes before sample is collected.     If there is bleeding in the mouth DO NOT complete the test.     - To make an appointment for the eyes exam

## 2025-06-13 NOTE — LETTER
6/13/2025       RE: Karthik Lopez  [address] [place] [address]     Dear Colleague,    Thank you for referring your patient, Karthik Lopez, to the [facility] ENDOCRINOLOGY CLINIC [place] at [facility]. Please see a copy of my visit note below.    Outcome for 06/11/25 12:50 PM: CLIPPATEhart message sent  Outcome for 06/12/25 12:54 PM: Left Voicemail   06/12/25 12:57 PM : Appointment reminder phone call made to patient. No Answer. LVM Advising pt to arrive 15 mins early and and to bring glucometer              Endocrinology Clinic Visit 6/13/2025    NAME:  Karthik Lopez  PCP:  Hattie Hillman Mai  MRN:  [id number]  Reason for Consult: DM type I.  Requesting Provider:  Emy Gaston    Chief Complaint     Chief Complaint   Patient presents with     Diabetes       History of Present Illness     Karthik Lopez is a 49 year old male who is seen in clinic for DM type I.  Today is his first visit with me.  He has background history of hypothyroidism, has hx of CHAZ and Obesity DM type 1 and hypothyroidism.      The patient was diagnosed with type 2 diabetes 2009.  On 11/19/2009 negative islet cell antibody, positive insulin antibodies 3.3 (<0.4) (not clear if he was on insulin at that time or not), positive annika 65 antibodies 8.4 (<=1.0), C-peptide 1.2 with glucose level of 295 these lab tests were done at [facility] scanned in the record on 1/14/2010.  Following that his diagnosis was changed to type I DM.    He stated today he used to follow up with endocrinology in [facility] in [place]       Historical diabetes medications:  Invokana.  Metformin did not tolerated due to the side effects.  Byetta.      Previous A1C in records reviewed. Today A1C is 8.1% compared to previous A1c of 8.5% on 2/24/2025.  Weight is 48.34    Diabetes Care/Complications:   Nephropathy: No history of nephropathy, screening for  albuminuria negative on 2/24/2025, EGFR>90 on 2/20/2025.  Retinopathy: History of mild/moderate NPDR last visit was on 8/24/2024 at [facility]  HLD: LDL 41 on 2/24/2025 on Crestor 20 mg daily.  Neuropathy:  HTN: No history of hypertension.      Previous DM related Hospitalization:  DKA on 12/31/2018.    Current treatment strategy:   Jardiance 25 mg daily.  Lantus 70 units daily good adherence no missed doses.  Humalog 1:10 g CHO mostly bolus before eating sometimes he bolus after eating.  Humalog correction of 1: 25 for BG above 200. Uses correction with estimation only does not follow the current scale.   Mounjaro 5 mg weekly prior to that was on 2.5 mg weekly started on this dose on 5/19/2025.Started to loose some weight loss 5 Ib , he was on it in the past he lost about 10-15 Ib over three months .     Blood Glucose Monitoring:       Overall Pattern: His BG increases over the night developed significant hyperglycemia, also has postprandial hyperglycemia with the dinner.        Has intact hypoglycemia awareness.       Diet:   Breakfast: 07:00 am sausage egg sandwich , or banana   Lunch: 12:00 pm chicken fingers , pizza rarely , soup   Dinner: 05:30-06:00 pm Mexican food , chipolet twice weekly   Snacks: prizzles , cookies , covers for snacks mostly at the night time 09:00 pm      Exercise: walking during work in the office   Job: office job     Family hx of DM: Father and sister : type 2     History of hypothyroidism:  On levothyroxine 200 mcg daily.  He takes it first thing in the morning waits for 30 min before eating   adherence: good   Most recent test on 2/20/2025 TSH normal 1.88  Symptoms of hypo or hyperthyroidism:    Problem List     Patient Active Problem List   Diagnosis     Type 1 diabetes mellitus with hyperglycemia (H)     Hypothyroid     HCAZ (obstructive sleep apnea)        Medications     Current Outpatient Medications   Medication Sig Dispense Refill     aspirin 81 MG tablet Take 81 mg by mouth  daily       buPROPion (WELLBUTRIN XL) 300 MG 24 hr tablet Take 1 tablet by mouth daily.       Continuous Glucose Sensor (DEXCOM G6 SENSOR) MISC Change every 10 days. 3 each 5     Continuous Glucose Transmitter (DEXCOM G6 TRANSMITTER) MISC Change every 3 months. 1 each 3     furosemide (LASIX) 20 MG tablet Take 20 mg by mouth daily       insulin glargine (LANTUS PEN) 100 UNIT/ML pen Inject 78 Units subcutaneously daily. 75 mL 3     Insulin Lispro (HUMALOG KWIK PEN) 200 UNIT/ML soln To use with meals and snacks 1:10 g cho except with dinner 1:8 g cho and correction scale of 4:50 >150. Max daily dose 200 units 90 mL 3     insulin pen needle (31G X 8 MM) 31G X 8 MM miscellaneous Use 5 pen needles daily or as directed. 100 each 1     KLOR-CON 20 MEQ CR tablet Take 20 mEq by mouth daily       levothyroxine (SYNTHROID/LEVOTHROID) 200 MCG tablet Take 1 tablet (200 mcg) by mouth daily. 90 tablet 3     losartan (COZAAR) 100 MG tablet Take 1 tablet by mouth daily.       Multiple Vitamins-Minerals (MULTIVITAMIN MEN) TABS Take 1 tablet by mouth daily       rosuvastatin (CRESTOR) 20 MG tablet Take 1 tablet (20 mg) by mouth daily. 90 tablet 3     sildenafil (VIAGRA) 50 MG tablet Take 1 tablet (50 mg) by mouth daily as needed (30 minutes prior to sexual activity) 30 tablet 11     tirzepatide (MOUNJARO) 5 MG/0.5ML SOAJ auto-injector pen Inject 0.5 mLs (5 mg) subcutaneously every 7 days. 2 mL 0     Tirzepatide (MOUNJARO) 7.5 MG/0.5ML SOAJ auto-injector pen Inject 0.5 mLs (7.5 mg) subcutaneously every 7 days. 2 mL 0     vitamin D2 (ERGOCALCIFEROL) 64062 units (1250 mcg) capsule TAKE 1 CAPSULE BY MOUTH ONCE WEEKLY       tirzepatide (MOUNJARO) 10 MG/0.5ML SOAJ auto-injector pen Inject 0.5 mLs (10 mg) subcutaneously every 7 days. (Patient not taking: Reported on 6/13/2025) 2 mL 0     tirzepatide (MOUNJARO) 2.5 MG/0.5ML SOAJ auto-injector pen Inject 0.5 mLs (2.5 mg) subcutaneously every 7 days. (Patient not taking: Reported on 6/13/2025)  2 mL 0     No current facility-administered medications for this visit.        Allergies     No Known Allergies    Medical / Surgical History     Past Medical History:   Diagnosis Date     Diabetes (H)      Hypothyroid      Past Surgical History:   Procedure Laterality Date     ORCHIECTOMY INGUINAL CHILD       TESTICLE SURGERY         Social History     Social History     Socioeconomic History     Marital status:      Spouse name: Not on file     Number of children: Not on file     Years of education: Not on file     Highest education level: Not on file   Occupational History     Not on file   Tobacco Use     Smoking status: Never     Passive exposure: Never     Smokeless tobacco: Never   Vaping Use     Vaping status: Never Used   Substance and Sexual Activity     Alcohol use: Not Currently     Alcohol/week: 0.0 - 1.0 standard drinks of alcohol     Comment: once a month     Drug use: No     Sexual activity: Yes     Partners: Female     Birth control/protection: Condom   Other Topics Concern     Not on file   Social History Narrative    Lives with wife Shelly and son Johnny and daughter Karen        No pets         Works as a  for [employer]      Social Drivers of Health     Financial Resource Strain: Low Risk  (2/24/2025)    Financial Resource Strain      Within the past 12 months, have you or your family members you live with been unable to get utilities (heat, electricity) when it was really needed?: No   Food Insecurity: Low Risk  (2/24/2025)    Food Insecurity      Within the past 12 months, did you worry that your food would run out before you got money to buy more?: No      Within the past 12 months, did the food you bought just not last and you didn t have money to get more?: No   Transportation Needs: Low Risk  (2/24/2025)    Transportation Needs      Within the past 12 months, has lack of transportation kept you from medical appointments, getting your medicines, non-medical meetings or appointments,  work, or from getting things that you need?: No   Physical Activity: Inactive (2/24/2025)    Exercise Vital Sign      Days of Exercise per Week: 3 days      Minutes of Exercise per Session: 0 min   Stress: No Stress Concern Present (2/24/2025)    [employer] - Occupational Stress Questionnaire      Feeling of Stress : Not at all   Social Connections: Unknown (2/24/2025)    Social Connection and Isolation Panel [NHANES]      Frequency of Communication with Friends and Family: Not on file      Frequency of Social Gatherings with Friends and Family: Once a week      Attends [facility] Services: Not on file      Active Member of Clubs or Organizations: Not on file      Attends Club or Organization Meetings: Not on file      Marital Status: Not on file   Interpersonal Safety: Low Risk  (2/24/2025)    Interpersonal Safety      Do you feel physically and emotionally safe where you currently live?: Yes      Within the past 12 months, have you been hit, slapped, kicked or otherwise physically hurt by someone?: No      Within the past 12 months, have you been humiliated or emotionally abused in other ways by your partner or ex-partner?: No   Housing Stability: Low Risk  (2/24/2025)    Housing Stability      Do you have housing? : Yes      Are you worried about losing your housing?: No       Family History     Family History   Problem Relation Age of Onset     Thyroid Disease Mother      Hypertension Father      Diabetes Type 2  Father      Lymphoma Father      Gout Maternal Grandmother      Myocardial Infarction Paternal Grandfather      Colon Cancer No family hx of      Prostate Cancer No family hx of        ROS     12 ROS completed, pertinent positive and negative in HPI    Physical Exam   /82   Pulse 97   Wt (!) 155.6 kg (343 lb)   SpO2 94%   BMI 48.34 kg/m       General: Comfortable, no obvious distress, normal body habitus  Eyes: Sclera anicteric, moist conjunctiva  HENT: Atraumatic,    CV: normal rate.   Resp:  good effort, no evidence of loud wheezing   Skin: No rashes, lesions, or subcutaneous nodules on exposed skin.   Psych: Alert and oriented x 3. Appropriate affect, good insight  Extremities: No peripheral edema  Neuro: Moves all four extremities. No focal deficits on limited exam.   Foot exam:  Pulses:  Dorsalis pedis: present bilaterally  Posterior tibial: present bilaterally  Foot exam:  Vibration/Light touch: sensation present bilaterally  Skin of foot: intact bilaterally   Labs/Imaging     Pertinent Labs were reviewed and discussed briefly.    Summary of recent findings:   Lab Results   Component Value Date    A1C 8.5 02/24/2025    A1C 9.4 01/02/2019    A1C 9.3 12/31/2018    A1C 8.8 01/29/2018       TSH   Date Value Ref Range Status   02/24/2025 1.88 0.30 - 4.20 uIU/mL Final       Creatinine   Date Value Ref Range Status   02/24/2025 0.94 0.67 - 1.17 mg/dL Final   02/01/2019 0.83 0.66 - 1.25 mg/dL Final       Recent Labs   Lab Test 02/24/25  1034 02/01/19  0946   CHOL 121 201*   HDL 40 40   LDL 41 143*   TRIG 201* 89           I personally reviewed the patient's outside records from [facility] EMR, Care Everywhere, and faxed records. Summary of pertinent findings in HPI.    Impression / Plan     1.  DM type I: A1c 8.1% goal A1c less than 7%.  2.  High insulin resistance:  3.  Obesity BMI 48  Was diagnosed in 2019 shortly after diagnosis was found to have positive antibodies.  Today was found to be using Jardiance despite having history of DKA in the past.  He does not have history of nephropathy.  In addition to Jardiance he uses Lantus 70 units, Humalog 1 unit: 10 g CHO with meals supposed to be on correction scale of 1: 25 above 200 however he uses additional units random if if BG is elevated with no response.  Was also recently started on Mounjaro currently on 5 mg weekly.  Uses Dexcom G6 report for the last 2 weeks was reviewed today was found to have GMI of 8%, his BG increases  gradually over the night despite not eating over the night and ends with significant hyperglycemia by the morning time, he also has pattern of postprandial hyperglycemia after the dinner.      I discussed with him today regarding discontinuing using Jardiance given history of DKA and given the diagnosis of type 1 diabetes he agreed with that.  Regarding Mounjaro given the history of obesity with CHAZ and history of high insulin resistance Mounjaro in his case will lead to better control of the diabetes as well as reducing the risk of developing complications in the future.    Plan:  -Given hyperglycemia over the night to increase Lantus dose from 70 units up to 78 units daily.  -Continue with Humalog 1 unit: 10 g CHO with breakfast lunch and snacks and to change the coverage for the dinner to 1 unit: 8 g CHO.  -To change the current correction scale to a new correction scale of 4: 50 for BG above 150 3 times daily AC.  -To continue to use Mounjaro 5 mg weekly referral to MTM is placed for up titration of Mounjaro and to monitor for the side effects.  -To stop using Jardiance.  -To get the screening for Cushing syndrome/acromegaly done.  - To make an appointment for diabetic eye exam referral is placed today.  - I discussed with him today about considering automated insulin delivery systems instead of the MDI he stated he does not feel very excited about that but is willing to learn about it and if he likes one of the pump he will proceed with it.  Referral to CDE is placed.    4.  Hyperlipidemia:  Crestor 20 mg daily.    5.  Hypothyroidism:  Continue levothyroxine 200 mcg daily.            Test and/or medications prescribed today:  Orders Placed This Encounter   Procedures     GLUCOSE MONITOR, 72 HOUR, PHYS INTERP     INITIAL FOOT EXAM PT LOPS     FOOT EXAM  NO CHARGE [[id number]]     AFINION HEMOGLOBIN A1C POCT     Cortisol Saliva     Cortisol Saliva     Cortisol Cortisone Free 24 Hour Urine     Med Therapy  "Management Referral     Adult Diabetes Education  Referral         Follow up: 3 months       ANNA Chan  Endocrinology, Diabetes and Metabolism  [facility]    61 minutes spent on the date of the encounter doing chart review, history and exam, documentation and further activities per the note This time excludes time spent reviewing CGM.\"      Note: Chart documentation done in part with Dragon Voice Recognition software. Although reviewed after completion, some word and grammatical errors may remain.  Please consider this when interpreting information in this chart      Again, thank you for allowing me to participate in the care of your patient.      Sincerely,    ANNA Chan    "

## 2025-06-13 NOTE — PROGRESS NOTES
Endocrinology Clinic Visit 6/13/2025    NAME:  Karthik Lopez  PCP:  Hattie Hillman Mai  MRN:  [id number]  Reason for Consult: DM type I.  Requesting Provider:  Emy Gaston    Chief Complaint     Chief Complaint   Patient presents with    Diabetes       History of Present Illness     Karthik Lopez is a 49 year old male who is seen in clinic for DM type I.  Today is his first visit with me.  He has background history of hypothyroidism, has hx of CHAZ and Obesity DM type 1 and hypothyroidism.      The patient was diagnosed with type 2 diabetes 2009.  On 11/19/2009 negative islet cell antibody, positive insulin antibodies 3.3 (<0.4) (not clear if he was on insulin at that time or not), positive annika 65 antibodies 8.4 (<=1.0), C-peptide 1.2 with glucose level of 295 these lab tests were done at [facility] scanned in the record on 1/14/2010.  Following that his diagnosis was changed to type I DM.    He stated today he used to follow up with endocrinology in [facility] in [place]       Historical diabetes medications:  Invokana.  Metformin did not tolerated due to the side effects.  Byetta.      Previous A1C in records reviewed. Today A1C is 8.1% compared to previous A1c of 8.5% on 2/24/2025.  Weight is 48.34    Diabetes Care/Complications:   Nephropathy: No history of nephropathy, screening for albuminuria negative on 2/24/2025, EGFR>90 on 2/20/2025.  Retinopathy: History of mild/moderate NPDR last visit was on 8/24/2024 at [facility]  HLD: LDL 41 on 2/24/2025 on Crestor 20 mg daily.  Neuropathy:  HTN: No history of hypertension.      Previous DM related Hospitalization:  DKA on 12/31/2018.    Current treatment strategy:   Jardiance 25 mg daily.  Lantus 70 units daily good adherence no missed doses.  Humalog 1:10 g CHO mostly bolus before eating sometimes he bolus after eating.  Humalog correction of 1: 25 for BG above 200. Uses correction with estimation only does not follow the  current scale.   Mounjaro 5 mg weekly prior to that was on 2.5 mg weekly started on this dose on 5/19/2025.Started to loose some weight loss 5 Ib , he was on it in the past he lost about 10-15 Ib over three months .     Blood Glucose Monitoring:       Overall Pattern: His BG increases over the night developed significant hyperglycemia, also has postprandial hyperglycemia with the dinner.        Has intact hypoglycemia awareness.       Diet:   Breakfast: 07:00 am sausage egg sandwich , or banana   Lunch: 12:00 pm chicken fingers , pizza rarely , soup   Dinner: 05:30-06:00 pm Mexican food , chipolet twice weekly   Snacks: prizzles , cookies , covers for snacks mostly at the night time 09:00 pm      Exercise: walking during work in the office   Job: office job     Family hx of DM: Father and sister : type 2     History of hypothyroidism:  On levothyroxine 200 mcg daily.  He takes it first thing in the morning waits for 30 min before eating   adherence: good   Most recent test on 2/20/2025 TSH normal 1.88  Symptoms of hypo or hyperthyroidism:    Problem List     Patient Active Problem List   Diagnosis    Type 1 diabetes mellitus with hyperglycemia (H)    Hypothyroid    CHAZ (obstructive sleep apnea)        Medications     Current Outpatient Medications   Medication Sig Dispense Refill    aspirin 81 MG tablet Take 81 mg by mouth daily      buPROPion (WELLBUTRIN XL) 300 MG 24 hr tablet Take 1 tablet by mouth daily.      Continuous Glucose Sensor (DEXCOM G6 SENSOR) MISC Change every 10 days. 3 each 5    Continuous Glucose Transmitter (DEXCOM G6 TRANSMITTER) MISC Change every 3 months. 1 each 3    furosemide (LASIX) 20 MG tablet Take 20 mg by mouth daily      insulin glargine (LANTUS PEN) 100 UNIT/ML pen Inject 78 Units subcutaneously daily. 75 mL 3    Insulin Lispro (HUMALOG KWIK PEN) 200 UNIT/ML soln To use with meals and snacks 1:10 g cho except with dinner 1:8 g cho and correction scale of 4:50 >150. Max daily dose 200  units 90 mL 3    insulin pen needle (31G X 8 MM) 31G X 8 MM miscellaneous Use 5 pen needles daily or as directed. 100 each 1    KLOR-CON 20 MEQ CR tablet Take 20 mEq by mouth daily      levothyroxine (SYNTHROID/LEVOTHROID) 200 MCG tablet Take 1 tablet (200 mcg) by mouth daily. 90 tablet 3    losartan (COZAAR) 100 MG tablet Take 1 tablet by mouth daily.      Multiple Vitamins-Minerals (MULTIVITAMIN MEN) TABS Take 1 tablet by mouth daily      rosuvastatin (CRESTOR) 20 MG tablet Take 1 tablet (20 mg) by mouth daily. 90 tablet 3    sildenafil (VIAGRA) 50 MG tablet Take 1 tablet (50 mg) by mouth daily as needed (30 minutes prior to sexual activity) 30 tablet 11    tirzepatide (MOUNJARO) 5 MG/0.5ML SOAJ auto-injector pen Inject 0.5 mLs (5 mg) subcutaneously every 7 days. 2 mL 0    Tirzepatide (MOUNJARO) 7.5 MG/0.5ML SOAJ auto-injector pen Inject 0.5 mLs (7.5 mg) subcutaneously every 7 days. 2 mL 0    vitamin D2 (ERGOCALCIFEROL) 04048 units (1250 mcg) capsule TAKE 1 CAPSULE BY MOUTH ONCE WEEKLY      tirzepatide (MOUNJARO) 10 MG/0.5ML SOAJ auto-injector pen Inject 0.5 mLs (10 mg) subcutaneously every 7 days. (Patient not taking: Reported on 6/13/2025) 2 mL 0    tirzepatide (MOUNJARO) 2.5 MG/0.5ML SOAJ auto-injector pen Inject 0.5 mLs (2.5 mg) subcutaneously every 7 days. (Patient not taking: Reported on 6/13/2025) 2 mL 0     No current facility-administered medications for this visit.        Allergies     No Known Allergies    Medical / Surgical History     Past Medical History:   Diagnosis Date    Diabetes (H)     Hypothyroid      Past Surgical History:   Procedure Laterality Date    ORCHIECTOMY INGUINAL CHILD      TESTICLE SURGERY         Social History     Social History     Socioeconomic History    Marital status:      Spouse name: Not on file    Number of children: Not on file    Years of education: Not on file    Highest education level: Not on file   Occupational History    Not on file   Tobacco Use    Smoking  status: Never     Passive exposure: Never    Smokeless tobacco: Never   Vaping Use    Vaping status: Never Used   Substance and Sexual Activity    Alcohol use: Not Currently     Alcohol/week: 0.0 - 1.0 standard drinks of alcohol     Comment: once a month    Drug use: No    Sexual activity: Yes     Partners: Female     Birth control/protection: Condom   Other Topics Concern    Not on file   Social History Narrative    Lives with wife Shelly and son Johnny and daughter Karen        No pets         Works as a  for [employer]      Social Drivers of Health     Financial Resource Strain: Low Risk  (2/24/2025)    Financial Resource Strain     Within the past 12 months, have you or your family members you live with been unable to get utilities (heat, electricity) when it was really needed?: No   Food Insecurity: Low Risk  (2/24/2025)    Food Insecurity     Within the past 12 months, did you worry that your food would run out before you got money to buy more?: No     Within the past 12 months, did the food you bought just not last and you didn t have money to get more?: No   Transportation Needs: Low Risk  (2/24/2025)    Transportation Needs     Within the past 12 months, has lack of transportation kept you from medical appointments, getting your medicines, non-medical meetings or appointments, work, or from getting things that you need?: No   Physical Activity: Inactive (2/24/2025)    Exercise Vital Sign     Days of Exercise per Week: 3 days     Minutes of Exercise per Session: 0 min   Stress: No Stress Concern Present (2/24/2025)    [employer] [place] [employer] - Occupational Stress Questionnaire     Feeling of Stress : Not at all   Social Connections: Unknown (2/24/2025)    Social Connection and Isolation Panel [NHANES]     Frequency of Communication with Friends and Family: Not on file     Frequency of Social Gatherings with Friends and Family: Once a week     Attends Episcopal Services: Not on file     Active  Member of Clubs or Organizations: Not on file     Attends Club or Organization Meetings: Not on file     Marital Status: Not on file   Interpersonal Safety: Low Risk  (2/24/2025)    Interpersonal Safety     Do you feel physically and emotionally safe where you currently live?: Yes     Within the past 12 months, have you been hit, slapped, kicked or otherwise physically hurt by someone?: No     Within the past 12 months, have you been humiliated or emotionally abused in other ways by your partner or ex-partner?: No   Housing Stability: Low Risk  (2/24/2025)    Housing Stability     Do you have housing? : Yes     Are you worried about losing your housing?: No       Family History     Family History   Problem Relation Age of Onset    Thyroid Disease Mother     Hypertension Father     Diabetes Type 2  Father     Lymphoma Father     Gout Maternal Grandmother     Myocardial Infarction Paternal Grandfather     Colon Cancer No family hx of     Prostate Cancer No family hx of        ROS     12 ROS completed, pertinent positive and negative in HPI    Physical Exam   /82   Pulse 97   Wt (!) 155.6 kg (343 lb)   SpO2 94%   BMI 48.34 kg/m       General: Comfortable, no obvious distress, normal body habitus  Eyes: Sclera anicteric, moist conjunctiva  HENT: Atraumatic,   CV: normal rate.   Resp:  good effort, no evidence of loud wheezing   Skin: No rashes, lesions, or subcutaneous nodules on exposed skin.   Psych: Alert and oriented x 3. Appropriate affect, good insight  Extremities: No peripheral edema  Neuro: Moves all four extremities. No focal deficits on limited exam.   Foot exam:  Pulses:  Dorsalis pedis: present bilaterally  Posterior tibial: present bilaterally  Foot exam:  Vibration/Light touch: sensation present bilaterally  Skin of foot: intact bilaterally   Labs/Imaging     Pertinent Labs were reviewed and discussed briefly.    Summary of recent findings:   Lab Results   Component Value Date    A1C 8.5  02/24/2025    A1C 9.4 01/02/2019    A1C 9.3 12/31/2018    A1C 8.8 01/29/2018       TSH   Date Value Ref Range Status   02/24/2025 1.88 0.30 - 4.20 uIU/mL Final       Creatinine   Date Value Ref Range Status   02/24/2025 0.94 0.67 - 1.17 mg/dL Final   02/01/2019 0.83 0.66 - 1.25 mg/dL Final       Recent Labs   Lab Test 02/24/25  1034 02/01/19  0946   CHOL 121 201*   HDL 40 40   LDL 41 143*   TRIG 201* 89           I personally reviewed the patient's outside records from [employer] EMR, Care Everywhere, and faxed records. Summary of pertinent findings in HPI.    Impression / Plan     1.  DM type I: A1c 8.1% goal A1c less than 7%.  2.  High insulin resistance:  3.  Obesity BMI 48  Was diagnosed in 2019 shortly after diagnosis was found to have positive antibodies.  Today was found to be using Jardiance despite having history of DKA in the past.  He does not have history of nephropathy.  In addition to Jardiance he uses Lantus 70 units, Humalog 1 unit: 10 g CHO with meals supposed to be on correction scale of 1: 25 above 200 however he uses additional units random if if BG is elevated with no response.  Was also recently started on Mounjaro currently on 5 mg weekly.  Uses Dexcom G6 report for the last 2 weeks was reviewed today was found to have GMI of 8%, his BG increases gradually over the night despite not eating over the night and ends with significant hyperglycemia by the morning time, he also has pattern of postprandial hyperglycemia after the dinner.      I discussed with him today regarding discontinuing using Jardiance given history of DKA and given the diagnosis of type 1 diabetes he agreed with that.  Regarding Mounjaro given the history of obesity with CHAZ and history of high insulin resistance Mounjaro in his case will lead to better control of the diabetes as well as reducing the risk of developing complications in the future.    Plan:  -Given hyperglycemia over the night to increase Lantus dose from 70 units up  "to 78 units daily.  -Continue with Humalog 1 unit: 10 g CHO with breakfast lunch and snacks and to change the coverage for the dinner to 1 unit: 8 g CHO.  -To change the current correction scale to a new correction scale of 4: 50 for BG above 150 3 times daily AC.  -To continue to use Mounjaro 5 mg weekly referral to MTM is placed for up titration of Mounjaro and to monitor for the side effects.  -To stop using Jardiance.  -To get the screening for Cushing syndrome/acromegaly done.  - To make an appointment for diabetic eye exam referral is placed today.  - I discussed with him today about considering automated insulin delivery systems instead of the MDI he stated he does not feel very excited about that but is willing to learn about it and if he likes one of the pump he will proceed with it.  Referral to CDE is placed.    4.  Hyperlipidemia:  Crestor 20 mg daily.    5.  Hypothyroidism:  Continue levothyroxine 200 mcg daily.            Test and/or medications prescribed today:  Orders Placed This Encounter   Procedures    GLUCOSE MONITOR, 72 HOUR, PHYS INTERP    INITIAL FOOT EXAM PT LOPS    FOOT EXAM  NO CHARGE [25572.114]    AFINION HEMOGLOBIN A1C POCT    Cortisol Saliva    Cortisol Saliva    Cortisol Cortisone Free 24 Hour Urine    Med Therapy Management Referral    Adult Diabetes Education  Referral         Follow up: 3 months       ANNA Chan  Endocrinology, Diabetes and Metabolism  [facility]    61 minutes spent on the date of the encounter doing chart review, history and exam, documentation and further activities per the note This time excludes time spent reviewing CGM.\"      Note: Chart documentation done in part with Dragon Voice Recognition software. Although reviewed after completion, some word and grammatical errors may remain.  Please consider this when interpreting information in this chart    "

## 2025-06-16 ENCOUNTER — PATIENT OUTREACH (OUTPATIENT)
Dept: CARE COORDINATION | Facility: CLINIC | Age: 50
End: 2025-06-16
Payer: COMMERCIAL

## 2025-06-17 ENCOUNTER — TELEPHONE (OUTPATIENT)
Dept: ENDOCRINOLOGY | Facility: CLINIC | Age: 50
End: 2025-06-17
Payer: COMMERCIAL

## 2025-06-17 NOTE — TELEPHONE ENCOUNTER
MTM referral from: [facility] visit (referral by provider)    MTM referral outreach attempt #2 on June 17, 2025 at 12:35 PM      Outcome: Patient not reachable after several attempts, routed to Pharmacist Team/Provider as an FYI    Use hbc for the carrier/Plan on the flowsheet      [employer] Message Sent    Fernando Sosa  MTM

## 2025-06-18 ENCOUNTER — PATIENT OUTREACH (OUTPATIENT)
Dept: CARE COORDINATION | Facility: CLINIC | Age: 50
End: 2025-06-18
Payer: COMMERCIAL

## 2025-07-01 ENCOUNTER — OFFICE VISIT (OUTPATIENT)
Dept: PEDIATRICS | Facility: CLINIC | Age: 50
End: 2025-07-01
Payer: COMMERCIAL

## 2025-07-01 VITALS
HEIGHT: 70 IN | WEIGHT: 315 LBS | TEMPERATURE: 98.2 F | BODY MASS INDEX: 45.1 KG/M2 | DIASTOLIC BLOOD PRESSURE: 76 MMHG | HEART RATE: 90 BPM | SYSTOLIC BLOOD PRESSURE: 117 MMHG | RESPIRATION RATE: 20 BRPM | OXYGEN SATURATION: 96 %

## 2025-07-01 DIAGNOSIS — G47.33 OSA (OBSTRUCTIVE SLEEP APNEA): ICD-10-CM

## 2025-07-01 DIAGNOSIS — E10.65 TYPE 1 DIABETES MELLITUS WITH HYPERGLYCEMIA (H): ICD-10-CM

## 2025-07-01 DIAGNOSIS — Z12.11 ENCOUNTER FOR SCREENING COLONOSCOPY: ICD-10-CM

## 2025-07-01 DIAGNOSIS — Z01.818 PREOP GENERAL PHYSICAL EXAM: Primary | ICD-10-CM

## 2025-07-01 PROCEDURE — 3074F SYST BP LT 130 MM HG: CPT | Performed by: PHYSICIAN ASSISTANT

## 2025-07-01 PROCEDURE — 1126F AMNT PAIN NOTED NONE PRSNT: CPT | Performed by: PHYSICIAN ASSISTANT

## 2025-07-01 PROCEDURE — 3078F DIAST BP <80 MM HG: CPT | Performed by: PHYSICIAN ASSISTANT

## 2025-07-01 PROCEDURE — 99213 OFFICE O/P EST LOW 20 MIN: CPT | Performed by: PHYSICIAN ASSISTANT

## 2025-07-01 ASSESSMENT — PAIN SCALES - GENERAL: PAINLEVEL_OUTOF10: NO PAIN (0)

## 2025-07-01 NOTE — PROGRESS NOTES
"Pt reports some relief from HA but still has one. States, \" I don't feel like I can leave right now.\"  " Preoperative Evaluation  [facility]  [address]  [address]  [place] [address]  Phone: [phone]  Fax: [phone]  Primary Provider: Jonathan Hillman MD  Pre-op Performing Provider: Debra Harden PA-C  Jul 1, 2025 6/26/2025   Surgical Information   What procedure is being done? Colonoscopy   Facility or Hospital where procedure/surgery will be performed: [facility]   Who is doing the procedure / surgery? James Morse MD    Date of surgery / procedure: 7/14   Time of surgery / procedure: 8:30   Where do you plan to recover after surgery? at home with family     Fax number for surgical facility: Note does not need to be faxed, will be available electronically in Epic.    Assessment & Plan     The proposed surgical procedure is considered LOW risk.    Preop general physical exam    Encounter for screening colonoscopy    Type 1 diabetes mellitus with hyperglycemia (H)  Well controlled    CHAZ (obstructive sleep apnea)       Risks and Recommendations  The patient has the following additional risks and recommendations for perioperative complications:  Obstructive Sleep Apnea:   Manage oxygen sats margo/post operatively    Patient Instructions   Hold mounjaro on July 8  Stop Asprin, MVI and vit D one week prior (July 7)   Hold losartan and lasix day day of procedure  Take 80% of lantus 62 units morning of surgery    Recommendation  Approval given to proceed with proposed procedure, without further diagnostic evaluation.    Jasmni Méndez is a 49 year old, presenting for the following:    HPI: screening colonoscopy under gen anesthesia.          6/26/2025   Pre-Op Questionnaire   Have you ever had a heart attack or stroke? No   Have you ever had surgery on your heart or blood vessels, such as a stent placement, a coronary artery bypass, or surgery on an artery in your head, neck, heart, or legs? No   Do you have chest pain with activity? No    Do you have a history of heart failure? No   Do you currently have a cold, bronchitis or symptoms of other infection? No   Do you have a cough, shortness of breath, or wheezing? No   Do you or anyone in your family have previous history of blood clots? No   Do you or does anyone in your family have a serious bleeding problem such as prolonged bleeding following surgeries or cuts? No   Have you ever had problems with anemia or been told to take iron pills? No   Have you had any abnormal blood loss such as black, tarry or bloody stools? No   Have you ever had a blood transfusion? No   Are you willing to have a blood transfusion if it is medically needed before, during, or after your surgery? Yes   Have you or any of your relatives ever had problems with anesthesia? No   Do you have sleep apnea, excessive snoring or daytime drowsiness? (!) YES   Do you have a CPAP machine? Yes   Do you have any artifical heart valves or other implanted medical devices like a pacemaker, defibrillator, or continuous glucose monitor? No   Do you have artificial joints? No   Are you allergic to latex? No     Patient Active Problem List    Diagnosis Date Noted    CHAZ (obstructive sleep apnea) 02/24/2025     Priority: Medium     Uses CPAP      Type 1 diabetes mellitus with hyperglycemia (H) 02/01/2019     Priority: Medium    Hypothyroid      Priority: Medium      Past Medical History:   Diagnosis Date    Diabetes (H)     Hypothyroid      Past Surgical History:   Procedure Laterality Date    DENTAL SURGERY Right 2022    ORCHIECTOMY INGUINAL CHILD  1977     Current Outpatient Medications   Medication Sig Dispense Refill    aspirin 81 MG tablet Take 81 mg by mouth daily      buPROPion (WELLBUTRIN XL) 300 MG 24 hr tablet Take 1 tablet by mouth daily.      Continuous Glucose Sensor (DEXCOM G6 SENSOR) MISC Change every 10 days. 3 each 5    Continuous Glucose Transmitter (DEXCOM G6 TRANSMITTER) MISC Change every 3 months. 1 each 3    furosemide  (LASIX) 20 MG tablet Take 20 mg by mouth daily      insulin glargine (LANTUS PEN) 100 UNIT/ML pen Inject 78 Units subcutaneously daily. 75 mL 3    Insulin Lispro (HUMALOG KWIK PEN) 200 UNIT/ML soln To use with meals and snacks 1:10 g cho except with dinner 1:8 g cho and correction scale of 4:50 >150. Max daily dose 200 units 90 mL 3    insulin pen needle (31G X 8 MM) 31G X 8 MM miscellaneous Use 5 pen needles daily or as directed. 100 each 1    KLOR-CON 20 MEQ CR tablet Take 20 mEq by mouth daily      levothyroxine (SYNTHROID/LEVOTHROID) 200 MCG tablet Take 1 tablet (200 mcg) by mouth daily. 90 tablet 3    losartan (COZAAR) 100 MG tablet Take 1 tablet by mouth daily.      Multiple Vitamins-Minerals (MULTIVITAMIN MEN) TABS Take 1 tablet by mouth daily      rosuvastatin (CRESTOR) 20 MG tablet Take 1 tablet (20 mg) by mouth daily. 90 tablet 3    sildenafil (VIAGRA) 50 MG tablet Take 1 tablet (50 mg) by mouth daily as needed (30 minutes prior to sexual activity) 30 tablet 11    tirzepatide (MOUNJARO) 10 MG/0.5ML SOAJ auto-injector pen Inject 0.5 mLs (10 mg) subcutaneously every 7 days. (Patient not taking: Reported on 6/13/2025) 2 mL 0    Tirzepatide (MOUNJARO) 7.5 MG/0.5ML SOAJ auto-injector pen Inject 0.5 mLs (7.5 mg) subcutaneously every 7 days. 2 mL 0    vitamin D2 (ERGOCALCIFEROL) 85952 units (1250 mcg) capsule TAKE 1 CAPSULE BY MOUTH ONCE WEEKLY         No Known Allergies     Social History     Tobacco Use    Smoking status: Never     Passive exposure: Never    Smokeless tobacco: Never   Substance Use Topics    Alcohol use: Not Currently     Alcohol/week: 0.0 - 1.0 standard drinks of alcohol     Comment: once a month     History   Drug Use No       Review of Systems  Constitutional, HEENT, cardiovascular, pulmonary, gi and gu systems are negative, except as otherwise noted.    Objective    /76 (BP Location: Right arm, Patient Position: Sitting, Cuff Size: Adult Large)   Pulse 90   Temp 98.2  F (36.8  C)  "(Temporal)   Resp 20   Ht 1.785 m (5' 10.28\")   Wt (!) 155.3 kg (342 lb 4.8 oz)   SpO2 96%   BMI 48.73 kg/m     Estimated body mass index is 48.73 kg/m  as calculated from the following:    Height as of this encounter: 1.785 m (5' 10.28\").    Weight as of this encounter: 155.3 kg (342 lb 4.8 oz).  Physical Exam  GENERAL: alert and no distress  EYES: Eyes grossly normal to inspection, PERRL and conjunctivae and sclerae normal  HENT: nose and mouth without ulcers or lesions  NECK: no adenopathy  RESP: lungs clear to auscultation - no rales, rhonchi or wheezes  CV: regular rate and rhythm, normal S1 S2, no S3 or S4, no murmur, click or rub, no peripheral edema  ABDOMEN: soft, nontender  MS: no gross musculoskeletal defects noted, no edema    Recent Labs   Lab Test 06/13/25  1423 02/24/25  1034   NA  --  140   POTASSIUM  --  4.2   CR  --  0.94   A1C 8.1* 8.5*        Diagnostics  No labs were ordered during this visit.   No EKG required, no history of coronary heart disease, significant arrhythmia, peripheral arterial disease or other structural heart disease.    Revised Cardiac Risk Index (RCRI)  The patient has the following serious cardiovascular risks for perioperative complications:   - Diabetes Mellitus (on Insulin) = 1 point     RCRI Interpretation: 1 point: Class II (low risk - 0.9% complication rate)         Signed Electronically by: Debra Harden PA-C  A copy of this evaluation report is provided to the requesting physician.  "

## 2025-07-01 NOTE — PATIENT INSTRUCTIONS
Hold mounjaro on July 8  Stop Asprin, MVI and vit D one week prior (July 7)   Hold losartan and lasix day day of procedure  Take 80% of lantus 62 units morning of surgery

## 2025-07-08 RX ORDER — COVID-19 VACCINE, MRNA 0.04 MG/.418ML
INJECTION, SUSPENSION INTRAMUSCULAR
COMMUNITY
Start: 2024-09-04

## 2025-07-08 RX ORDER — AMLODIPINE BESYLATE 5 MG/1
1 TABLET ORAL
COMMUNITY
Start: 2025-02-04

## 2025-07-08 RX ORDER — PNEUMOCOCCAL 20-VALENT CONJUGATE VACCINE 2.2; 2.2; 2.2; 2.2; 2.2; 2.2; 2.2; 2.2; 2.2; 2.2; 2.2; 2.2; 2.2; 2.2; 2.2; 2.2; 4.4; 2.2; 2.2; 2.2 UG/.5ML; UG/.5ML; UG/.5ML; UG/.5ML; UG/.5ML; UG/.5ML; UG/.5ML; UG/.5ML; UG/.5ML; UG/.5ML; UG/.5ML; UG/.5ML; UG/.5ML; UG/.5ML; UG/.5ML; UG/.5ML; UG/.5ML; UG/.5ML; UG/.5ML; UG/.5ML
INJECTION, SUSPENSION INTRAMUSCULAR
COMMUNITY
Start: 2024-09-04

## 2025-07-08 RX ORDER — INFLUENZA A VIRUS A/VICTORIA/4897/2022 IVR-238 (H1N1) ANTIGEN (FORMALDEHYDE INACTIVATED), INFLUENZA A VIRUS A/CALIFORNIA/122/2022 SAN-022 (H3N2) ANTIGEN (FORMALDEHYDE INACTIVATED), AND INFLUENZA B VIRUS B/MICHIGAN/01/2021 ANTIGEN (FORMALDEHYDE INACTIVATED) 15; 15; 15 MG/.5ML; MG/.5ML; MG/.5ML
INJECTION, SUSPENSION INTRAMUSCULAR
COMMUNITY
Start: 2024-09-04

## 2025-07-14 ENCOUNTER — ANESTHESIA (OUTPATIENT)
Dept: SURGERY | Facility: CLINIC | Age: 50
End: 2025-07-14
Payer: COMMERCIAL

## 2025-07-14 ENCOUNTER — HOSPITAL ENCOUNTER (OUTPATIENT)
Facility: CLINIC | Age: 50
Discharge: HOME | End: 2025-07-14
Attending: INTERNAL MEDICINE | Admitting: INTERNAL MEDICINE
Payer: COMMERCIAL

## 2025-07-14 ENCOUNTER — ANESTHESIA EVENT (OUTPATIENT)
Dept: SURGERY | Facility: CLINIC | Age: 50
End: 2025-07-14
Payer: COMMERCIAL

## 2025-07-14 VITALS
HEIGHT: 70 IN | DIASTOLIC BLOOD PRESSURE: 66 MMHG | TEMPERATURE: 97.3 F | SYSTOLIC BLOOD PRESSURE: 98 MMHG | WEIGHT: 315 LBS | BODY MASS INDEX: 45.1 KG/M2 | OXYGEN SATURATION: 100 % | HEART RATE: 78 BPM | RESPIRATION RATE: 15 BRPM

## 2025-07-14 LAB
COLONOSCOPY: NORMAL
GLUCOSE BLDC GLUCOMTR-MCNC: 148 MG/DL (ref 70–99)
GLUCOSE BLDC GLUCOMTR-MCNC: 217 MG/DL (ref 70–99)

## 2025-07-14 PROCEDURE — 258N000003 HC RX IP 258 OP 636: Performed by: ANESTHESIOLOGY

## 2025-07-14 PROCEDURE — 250N000011 HC RX IP 250 OP 636: Performed by: NURSE ANESTHETIST, CERTIFIED REGISTERED

## 2025-07-14 PROCEDURE — 360N000075 HC SURGERY LEVEL 2, PER MIN: Performed by: INTERNAL MEDICINE

## 2025-07-14 PROCEDURE — 250N000009 HC RX 250: Performed by: NURSE ANESTHETIST, CERTIFIED REGISTERED

## 2025-07-14 PROCEDURE — 370N000017 HC ANESTHESIA TECHNICAL FEE, PER MIN: Performed by: INTERNAL MEDICINE

## 2025-07-14 PROCEDURE — 272N000001 HC OR GENERAL SUPPLY STERILE: Performed by: INTERNAL MEDICINE

## 2025-07-14 PROCEDURE — 250N000011 HC RX IP 250 OP 636: Performed by: INTERNAL MEDICINE

## 2025-07-14 PROCEDURE — 258N000003 HC RX IP 258 OP 636: Performed by: NURSE ANESTHETIST, CERTIFIED REGISTERED

## 2025-07-14 PROCEDURE — 999N000141 HC STATISTIC PRE-PROCEDURE NURSING ASSESSMENT: Performed by: INTERNAL MEDICINE

## 2025-07-14 PROCEDURE — 710N000012 HC RECOVERY PHASE 2, PER MINUTE: Performed by: INTERNAL MEDICINE

## 2025-07-14 PROCEDURE — 82962 GLUCOSE BLOOD TEST: CPT

## 2025-07-14 RX ORDER — ONDANSETRON 2 MG/ML
4 INJECTION INTRAMUSCULAR; INTRAVENOUS EVERY 6 HOURS PRN
Status: DISCONTINUED | OUTPATIENT
Start: 2025-07-14 | End: 2025-07-14 | Stop reason: HOSPADM

## 2025-07-14 RX ORDER — KETAMINE HYDROCHLORIDE 10 MG/ML
INJECTION INTRAMUSCULAR; INTRAVENOUS PRN
Status: DISCONTINUED | OUTPATIENT
Start: 2025-07-14 | End: 2025-07-14

## 2025-07-14 RX ORDER — ONDANSETRON 4 MG/1
4 TABLET, ORALLY DISINTEGRATING ORAL EVERY 30 MIN PRN
Status: DISCONTINUED | OUTPATIENT
Start: 2025-07-14 | End: 2025-07-14 | Stop reason: HOSPADM

## 2025-07-14 RX ORDER — NALOXONE HYDROCHLORIDE 0.4 MG/ML
0.2 INJECTION, SOLUTION INTRAMUSCULAR; INTRAVENOUS; SUBCUTANEOUS
Status: DISCONTINUED | OUTPATIENT
Start: 2025-07-14 | End: 2025-07-14 | Stop reason: HOSPADM

## 2025-07-14 RX ORDER — LIDOCAINE 40 MG/G
CREAM TOPICAL
Status: DISCONTINUED | OUTPATIENT
Start: 2025-07-14 | End: 2025-07-14 | Stop reason: HOSPADM

## 2025-07-14 RX ORDER — NALOXONE HYDROCHLORIDE 0.4 MG/ML
0.4 INJECTION, SOLUTION INTRAMUSCULAR; INTRAVENOUS; SUBCUTANEOUS
Status: DISCONTINUED | OUTPATIENT
Start: 2025-07-14 | End: 2025-07-14 | Stop reason: HOSPADM

## 2025-07-14 RX ORDER — ACETAMINOPHEN 325 MG/1
975 TABLET ORAL
Status: DISCONTINUED | OUTPATIENT
Start: 2025-07-14 | End: 2025-07-14 | Stop reason: HOSPADM

## 2025-07-14 RX ORDER — DEXAMETHASONE SODIUM PHOSPHATE 4 MG/ML
4 INJECTION, SOLUTION INTRA-ARTICULAR; INTRALESIONAL; INTRAMUSCULAR; INTRAVENOUS; SOFT TISSUE
Status: DISCONTINUED | OUTPATIENT
Start: 2025-07-14 | End: 2025-07-14 | Stop reason: HOSPADM

## 2025-07-14 RX ORDER — PROCHLORPERAZINE MALEATE 10 MG
10 TABLET ORAL EVERY 6 HOURS PRN
Status: DISCONTINUED | OUTPATIENT
Start: 2025-07-14 | End: 2025-07-14 | Stop reason: HOSPADM

## 2025-07-14 RX ORDER — ONDANSETRON 2 MG/ML
4 INJECTION INTRAMUSCULAR; INTRAVENOUS
Status: COMPLETED | OUTPATIENT
Start: 2025-07-14 | End: 2025-07-14

## 2025-07-14 RX ORDER — ONDANSETRON 4 MG/1
4 TABLET, ORALLY DISINTEGRATING ORAL EVERY 6 HOURS PRN
Status: DISCONTINUED | OUTPATIENT
Start: 2025-07-14 | End: 2025-07-14 | Stop reason: HOSPADM

## 2025-07-14 RX ORDER — GLYCOPYRROLATE 0.2 MG/ML
INJECTION, SOLUTION INTRAMUSCULAR; INTRAVENOUS PRN
Status: DISCONTINUED | OUTPATIENT
Start: 2025-07-14 | End: 2025-07-14

## 2025-07-14 RX ORDER — FLUMAZENIL 0.1 MG/ML
0.2 INJECTION, SOLUTION INTRAVENOUS
Status: DISCONTINUED | OUTPATIENT
Start: 2025-07-14 | End: 2025-07-14 | Stop reason: HOSPADM

## 2025-07-14 RX ORDER — PROPOFOL 10 MG/ML
INJECTION, EMULSION INTRAVENOUS CONTINUOUS PRN
Status: DISCONTINUED | OUTPATIENT
Start: 2025-07-14 | End: 2025-07-14

## 2025-07-14 RX ORDER — ONDANSETRON 2 MG/ML
4 INJECTION INTRAMUSCULAR; INTRAVENOUS EVERY 30 MIN PRN
Status: DISCONTINUED | OUTPATIENT
Start: 2025-07-14 | End: 2025-07-14 | Stop reason: HOSPADM

## 2025-07-14 RX ORDER — SODIUM CHLORIDE, SODIUM LACTATE, POTASSIUM CHLORIDE, CALCIUM CHLORIDE 600; 310; 30; 20 MG/100ML; MG/100ML; MG/100ML; MG/100ML
INJECTION, SOLUTION INTRAVENOUS CONTINUOUS
Status: DISCONTINUED | OUTPATIENT
Start: 2025-07-14 | End: 2025-07-14 | Stop reason: HOSPADM

## 2025-07-14 RX ORDER — NALOXONE HYDROCHLORIDE 0.4 MG/ML
0.1 INJECTION, SOLUTION INTRAMUSCULAR; INTRAVENOUS; SUBCUTANEOUS
Status: DISCONTINUED | OUTPATIENT
Start: 2025-07-14 | End: 2025-07-14 | Stop reason: HOSPADM

## 2025-07-14 RX ADMIN — PROPOFOL 110 MCG/KG/MIN: 10 INJECTION, EMULSION INTRAVENOUS at 09:09

## 2025-07-14 RX ADMIN — SODIUM CHLORIDE, SODIUM LACTATE, POTASSIUM CHLORIDE, AND CALCIUM CHLORIDE: .6; .31; .03; .02 INJECTION, SOLUTION INTRAVENOUS at 09:03

## 2025-07-14 RX ADMIN — PHENYLEPHRINE HYDROCHLORIDE 100 MCG: 10 INJECTION INTRAVENOUS at 09:27

## 2025-07-14 RX ADMIN — PROPOFOL 40 MG: 10 INJECTION, EMULSION INTRAVENOUS at 09:10

## 2025-07-14 RX ADMIN — Medication 5 MG: at 09:26

## 2025-07-14 RX ADMIN — PHENYLEPHRINE HYDROCHLORIDE 100 MCG: 10 INJECTION INTRAVENOUS at 09:30

## 2025-07-14 RX ADMIN — GLYCOPYRROLATE 0.2 MG: 0.2 INJECTION, SOLUTION INTRAMUSCULAR; INTRAVENOUS at 09:03

## 2025-07-14 RX ADMIN — PHENYLEPHRINE HYDROCHLORIDE 50 MCG: 10 INJECTION INTRAVENOUS at 09:24

## 2025-07-14 RX ADMIN — PROPOFOL 30 MG: 10 INJECTION, EMULSION INTRAVENOUS at 09:15

## 2025-07-14 RX ADMIN — Medication 10 MG: at 09:14

## 2025-07-14 RX ADMIN — ONDANSETRON 4 MG: 2 INJECTION INTRAMUSCULAR; INTRAVENOUS at 09:30

## 2025-07-14 RX ADMIN — PROPOFOL 50 MG: 10 INJECTION, EMULSION INTRAVENOUS at 09:12

## 2025-07-14 RX ADMIN — Medication 15 MG: at 09:15

## 2025-07-14 RX ADMIN — PHENYLEPHRINE HYDROCHLORIDE 50 MCG: 10 INJECTION INTRAVENOUS at 09:19

## 2025-07-14 ASSESSMENT — ACTIVITIES OF DAILY LIVING (ADL)
ADLS_ACUITY_SCORE: 42

## 2025-07-14 NOTE — ANESTHESIA CARE TRANSFER NOTE
Patient: Karthik Lopez    Procedure: Procedure(s):  Colonoscopy       Diagnosis: Screening for colon cancer [Z12.11]  Diagnosis Additional Information: No value filed.    Anesthesia Type:   MAC     Note:    Oropharynx: oropharynx clear of all foreign objects and spontaneously breathing  Level of Consciousness: drowsy  Oxygen Supplementation: room air    Independent Airway: airway patency satisfactory and stable  Dentition: dentition unchanged  Vital Signs Stable: post-procedure vital signs reviewed and stable  Report to RN Given: handoff report given  Patient transferred to: Phase II    Handoff Report: Identifed the Patient, Identified the Reponsible Provider, Reviewed the pertinent medical history, Discussed the surgical course, Reviewed Intra-OP anesthesia mangement and issues during anesthesia, Set expectations for post-procedure period and Allowed opportunity for questions and acknowledgement of understanding  Vitals:  Vitals Value Taken Time   BP     Temp     Pulse     Resp     SpO2         Electronically Signed By: DIANELYS Peter CRNA  July 14, 2025  9:41 AM

## 2025-07-14 NOTE — CONSULTS
Pre-Endoscopy History and Physical     Karthik Lopez MRN# [id number]   YOB: 1975 Age: 50 year old     Date of Procedure: 7/14/2025  Primary care provider: Hattie Hillman Mai  Type of Endoscopy: colonoscopy  Reason for Procedure: colon cancer screening  Type of Anesthesia Anticipated: MAC    HPI:    Karthik is a 50 year old male who will be undergoing the above procedure.      A history and physical has been performed. The patient's medications and allergies have been reviewed. The risks and benefits of the procedure and the sedation options and risks were discussed with the patient.  All questions were answered and informed consent was obtained.      He denies a personal or family history of anesthesia complications or bleeding disorders.     No Known Allergies     Current Facility-Administered Medications   Medication Dose Route Frequency Provider Last Rate Last Admin    lidocaine (LMX4) cream   Topical Q1H PRN James Morse MD        lidocaine 1 % 0.1-1 mL  0.1-1 mL Other Q1H PRN James Morse MD        ondansetron (ZOFRAN) injection 4 mg  4 mg Intravenous Once PRN James Morse MD        sodium chloride (PF) 0.9% PF flush 3 mL  3 mL Intracatheter Q8H [facility] James Morse MD        sodium chloride (PF) 0.9% PF flush 3 mL  3 mL Intracatheter q1 min prn James Morse MD           Patient Active Problem List   Diagnosis    Type 1 diabetes mellitus with hyperglycemia (H)    Hypothyroid    CHAZ (obstructive sleep apnea)        Past Medical History:   Diagnosis Date    Diabetes (H)     Hypothyroid         Past Surgical History:   Procedure Laterality Date    DENTAL SURGERY Right 2022    ORCHIECTOMY INGUINAL CHILD  1975       Social History     Tobacco Use    Smoking status: Never     Passive exposure: Never    Smokeless tobacco: Never   Substance Use Topics    Alcohol use: Not Currently     Alcohol/week: 0.0 - 1.0 standard drinks of alcohol     Comment: once a month  "      Family History   Problem Relation Age of Onset    Thyroid Disease Mother     Obesity Mother     Hypertension Father     Diabetes Type 2  Father     Lymphoma Father     Diabetes Father         Type 2    Other Cancer Father     Gout Maternal Grandmother     Myocardial Infarction Paternal Grandfather     Diabetes Sister         Type 2    Colon Cancer No family hx of     Prostate Cancer No family hx of        REVIEW OF SYSTEMS:     5 point ROS negative except as noted above in HPI, including Gen., Resp., CV, GI &  system review.    PHYSICAL EXAM:   There were no vitals taken for this visit. Estimated body mass index is 48.73 kg/m  as calculated from the following:    Height as of 7/1/25: 1.785 m (5' 10.28\").    Weight as of 7/1/25: 155.3 kg (342 lb 4.8 oz).   GENERAL APPEARANCE: healthy  MENTAL STATUS: alert  AIRWAY EXAM: Mallampatti Class I (visualization of the soft palate, fauces, uvula, anterior and posterior pillars)  RESP: lungs clear to auscultation - no rales, rhonchi or wheezes  CV: regular rates and rhythm    DIAGNOSTICS:      Not indicated    IMPRESSION     ASA Class 2 - Mild systemic disease    PLAN:     Colonoscopy    The above has been forwarded to the consulting provider.    Signed Electronically by: James Morse MD  July 14, 2025    "

## 2025-07-14 NOTE — ANESTHESIA POSTPROCEDURE EVALUATION
Patient: Karthik Lopez    Procedure: Procedure(s):  Colonoscopy       Anesthesia Type:  MAC    Note:  Disposition: Outpatient   Postop Pain Control: Uneventful            Sign Out: Well controlled pain   PONV: No   Neuro/Psych: Uneventful            Sign Out: Acceptable/Baseline neuro status   Airway/Respiratory: Uneventful            Sign Out: Acceptable/Baseline resp. status   CV/Hemodynamics: Uneventful            Sign Out: Acceptable CV status; No obvious hypovolemia; No obvious fluid overload   Other NRE:    DID A NON-ROUTINE EVENT OCCUR? No           Last vitals:  Vitals Value Taken Time   BP 98/66 07/14/25 10:40   Temp 97.3  F (36.3  C) 07/14/25 10:43   Pulse 78 07/14/25 10:40   Resp 15 07/14/25 10:43   SpO2 100 % 07/14/25 10:44   Vitals shown include unfiled device data.    Electronically Signed By: Faustina Carson MD  July 14, 2025  11:38 AM

## 2025-07-14 NOTE — ANESTHESIA PREPROCEDURE EVALUATION
Anesthesia Pre-Procedure Evaluation    Patient: Karthik Lopez   MRN: [id number] : 1975          Procedure : Procedure(s):  Colonoscopy         Past Medical History:   Diagnosis Date    Diabetes (H)     Hypothyroid       Past Surgical History:   Procedure Laterality Date    DENTAL SURGERY Right     ORCHIECTOMY INGUINAL CHILD        No Known Allergies   Social History     Tobacco Use    Smoking status: Never     Passive exposure: Never    Smokeless tobacco: Never   Substance Use Topics    Alcohol use: Not Currently     Alcohol/week: 0.0 - 1.0 standard drinks of alcohol     Comment: once a month      Wt Readings from Last 1 Encounters:   25 (!) 152.7 kg (336 lb 11.2 oz)        Anesthesia Evaluation            ROS/MED HX  ENT/Pulmonary:     (+) sleep apnea,                                       Neurologic:       Cardiovascular:  - neg cardiovascular ROS     METS/Exercise Tolerance:     Hematologic:       Musculoskeletal:       GI/Hepatic:       Renal/Genitourinary:       Endo:     (+) type I DM,  Last HgA1c: 8.5,       thyroid problem,     Obesity (BMI 47),       Psychiatric/Substance Use:       Infectious Disease:       Malignancy:       Other:              Physical Exam  Airway  Mallampati: III  TM distance: >3 FB  Neck ROM: full    Cardiovascular - normal exam   Dental     Pulmonary - normal exam      Neurological   Other Findings       OUTSIDE LABS:  CBC:   Lab Results   Component Value Date    WBC 9.6 2019    WBC 11.6 (H) 2019    HGB 13.6 2019    HGB 13.8 2019    HCT 39.2 (L) 2019    HCT 39.4 (L) 2019     2019     2019     BMP:   Lab Results   Component Value Date     2025     2019    POTASSIUM 4.2 2025    POTASSIUM 4.1 2019    CHLORIDE 104 2025    CHLORIDE 106 2019    CO2 23 2025    CO2 29 2019    BUN 12.0 2025    BUN 15 2019    CR 0.94 2025    CR  "0.83 02/01/2019     (H) 07/14/2025     (H) 02/24/2025     COAGS:   Lab Results   Component Value Date    PTT 25 12/31/2018    INR 1.17 (H) 12/31/2018     POC:   Lab Results   Component Value Date     (H) 01/04/2019     HEPATIC:   Lab Results   Component Value Date    ALBUMIN 4.4 02/24/2025    PROTTOTAL 6.8 02/24/2025    ALT 36 02/24/2025    AST 32 02/24/2025    ALKPHOS 85 02/24/2025    BILITOTAL 0.5 02/24/2025     OTHER:   Lab Results   Component Value Date    LACT 5.2 (HH) 12/31/2018    A1C 8.1 (H) 06/13/2025    BAUTISTA 9.0 02/24/2025    PHOS 2.3 (L) 01/04/2019    MAG 2.2 12/31/2018    LIPASE 104 01/28/2018    TSH 1.88 02/24/2025       Anesthesia Plan    ASA Status:  3      NPO Status: NPO Appropriate   Anesthesia Type: MAC.  Airway: natural airway.  Induction: intravenous.   Techniques and Equipment:       - Monitoring Plan: standard ASA monitoring     Consents    Anesthesia Plan(s) and associated risks, benefits, and realistic alternatives discussed. Questions answered and patient/representative(s) expressed understanding.     - Discussed: CRNA     - Discussed with:                Postoperative Care    Pain management: non-narcotic analgesics.     Comments:                   Faustina Carson MD    I have reviewed the pertinent notes and labs in the chart from the past 30 days and (re)examined the patient.  Any updates or changes from those notes are reflected in this note.    Clinically Significant Risk Factors Present on Admission                 # Drug Induced Platelet Defect: home medication list includes an antiplatelet medication   # Hypertension: Home medication list includes antihypertensive(s)          # DMII: A1C = 8.1 % (Ref range: <=5.7 %) within past 6 months    # Morbid Obesity: Estimated body mass index is 47.93 kg/m  as calculated from the following:    Height as of this encounter: 1.785 m (5' 10.28\").    Weight as of this encounter: 152.7 kg (336 lb 11.2 oz).                    "

## 2025-07-14 NOTE — DISCHARGE INSTRUCTIONS
AMARILYS HOWARD M.D.     CLINIC PHONE NUMBER:  [phone]  [facility] [id number]  [facility]

## 2025-08-30 ENCOUNTER — TRANSFERRED RECORDS (OUTPATIENT)
Dept: HEALTH INFORMATION MANAGEMENT | Facility: CLINIC | Age: 50
End: 2025-08-30
Payer: COMMERCIAL

## 2025-08-30 LAB — RETINOPATHY: NEGATIVE

## (undated) DEVICE — BAG CLEAR TRASH 1.3M 39X33" P4040C

## (undated) DEVICE — TUBING SUCTION MEDI-VAC SOFT 3/16"X20' N520A

## (undated) DEVICE — UNDERPAD 36X30 PREMIERPRO MAX ABS NS LF 676111

## (undated) DEVICE — SUCTION MANIFOLD NEPTUNE 2 SYS 4 PORT 0702-020-000

## (undated) DEVICE — SOLUTION WATER 1000ML BOTTLE R5000-01

## (undated) RX ORDER — ONDANSETRON 2 MG/ML
INJECTION INTRAMUSCULAR; INTRAVENOUS
Status: DISPENSED
Start: 2025-07-14

## (undated) RX ORDER — FENTANYL CITRATE-0.9 % NACL/PF 10 MCG/ML
PLASTIC BAG, INJECTION (ML) INTRAVENOUS
Status: DISPENSED
Start: 2025-07-14

## (undated) RX ORDER — GLYCOPYRROLATE 0.2 MG/ML
INJECTION, SOLUTION INTRAMUSCULAR; INTRAVENOUS
Status: DISPENSED
Start: 2025-07-14

## (undated) RX ORDER — PROPOFOL 10 MG/ML
INJECTION, EMULSION INTRAVENOUS
Status: DISPENSED
Start: 2025-07-14